# Patient Record
Sex: MALE | Race: WHITE | NOT HISPANIC OR LATINO | Employment: OTHER | URBAN - METROPOLITAN AREA
[De-identification: names, ages, dates, MRNs, and addresses within clinical notes are randomized per-mention and may not be internally consistent; named-entity substitution may affect disease eponyms.]

---

## 2019-04-02 ENCOUNTER — HOSPITAL ENCOUNTER (EMERGENCY)
Facility: HOSPITAL | Age: 74
Discharge: OTHER INSTITUTION | End: 2019-04-03
Attending: FAMILY MEDICINE
Payer: MEDICARE

## 2019-04-02 DIAGNOSIS — R06.02 SOB (SHORTNESS OF BREATH): ICD-10-CM

## 2019-04-02 DIAGNOSIS — N17.9 AKI (ACUTE KIDNEY INJURY): Primary | ICD-10-CM

## 2019-04-02 DIAGNOSIS — I50.9 CONGESTIVE HEART FAILURE, UNSPECIFIED HF CHRONICITY, UNSPECIFIED HEART FAILURE TYPE: ICD-10-CM

## 2019-04-02 PROBLEM — I50.21 ACUTE SYSTOLIC CONGESTIVE HEART FAILURE: Status: ACTIVE | Noted: 2019-04-02

## 2019-04-02 PROBLEM — J96.21 ACUTE ON CHRONIC RESPIRATORY FAILURE WITH HYPOXIA: Status: ACTIVE | Noted: 2019-04-02

## 2019-04-02 PROBLEM — I10 ESSENTIAL HYPERTENSION: Status: ACTIVE | Noted: 2019-04-02

## 2019-04-02 LAB
ALBUMIN SERPL BCP-MCNC: 3 G/DL (ref 3.5–5.2)
ALLENS TEST: ABNORMAL
ALP SERPL-CCNC: 88 U/L (ref 55–135)
ALT SERPL W/O P-5'-P-CCNC: 8 U/L (ref 10–44)
ANION GAP SERPL CALC-SCNC: 12 MMOL/L (ref 8–16)
AST SERPL-CCNC: 14 U/L (ref 10–40)
BACTERIA #/AREA URNS HPF: ABNORMAL /HPF
BASOPHILS # BLD AUTO: 0.05 K/UL (ref 0–0.2)
BASOPHILS NFR BLD: 0.5 % (ref 0–1.9)
BILIRUB SERPL-MCNC: 0.7 MG/DL (ref 0.1–1)
BILIRUB UR QL STRIP: NEGATIVE
BNP SERPL-MCNC: 1027 PG/ML (ref 0–99)
BUN SERPL-MCNC: 84 MG/DL (ref 8–23)
CALCIUM SERPL-MCNC: 8.5 MG/DL (ref 8.7–10.5)
CHLORIDE SERPL-SCNC: 104 MMOL/L (ref 95–110)
CLARITY UR: ABNORMAL
CO2 SERPL-SCNC: 22 MMOL/L (ref 23–29)
COLOR UR: YELLOW
CPAPPEEP: ABNORMAL
CREAT SERPL-MCNC: 3.4 MG/DL (ref 0.5–1.4)
D DIMER PPP FEU-MCNC: 1580 NG/ML (ref 100–400)
DIFFERENTIAL METHOD: ABNORMAL
EOSINOPHIL # BLD AUTO: 0.1 K/UL (ref 0–0.5)
EOSINOPHIL NFR BLD: 1.2 % (ref 0–8)
ERYTHROCYTE [DISTWIDTH] IN BLOOD BY AUTOMATED COUNT: 17.7 % (ref 11.5–14.5)
ERYTHROCYTE [SEDIMENTATION RATE] IN BLOOD BY WESTERGREN METHOD: ABNORMAL MM/H
EST. GFR  (AFRICAN AMERICAN): 19.7 ML/MIN/1.73 M^2
EST. GFR  (NON AFRICAN AMERICAN): 17 ML/MIN/1.73 M^2
GLUCOSE SERPL-MCNC: 130 MG/DL (ref 70–110)
GLUCOSE UR QL STRIP: NEGATIVE
HCO3 UR-SCNC: 22.8 MMOL/L (ref 22–26)
HCT VFR BLD AUTO: 30 % (ref 40–54)
HGB BLD-MCNC: 9.1 G/DL (ref 14–18)
HGB UR QL STRIP: ABNORMAL
HYALINE CASTS #/AREA URNS LPF: 0 /LPF
IMM GRANULOCYTES # BLD AUTO: 0.04 K/UL (ref 0–0.04)
IMM GRANULOCYTES NFR BLD AUTO: 0.4 % (ref 0–0.5)
KETONES UR QL STRIP: NEGATIVE
LEUKOCYTE ESTERASE UR QL STRIP: ABNORMAL
LYMPHOCYTES # BLD AUTO: 1.2 K/UL (ref 1–4.8)
LYMPHOCYTES NFR BLD: 11.2 % (ref 18–48)
MCH RBC QN AUTO: 25.9 PG (ref 27–31)
MCHC RBC AUTO-ENTMCNC: 30.3 G/DL (ref 32–36)
MCV RBC AUTO: 86 FL (ref 82–98)
MICROSCOPIC COMMENT: ABNORMAL
MODE: ABNORMAL
MONOCYTES # BLD AUTO: 0.9 K/UL (ref 0.3–1)
MONOCYTES NFR BLD: 8.9 % (ref 4–15)
NEUTROPHILS # BLD AUTO: 8 K/UL (ref 1.8–7.7)
NEUTROPHILS NFR BLD: 77.8 % (ref 38–73)
NITRITE UR QL STRIP: POSITIVE
NRBC BLD-RTO: 0 /100 WBC
O2DEVICE: ABNORMAL
PCO2 BLDA: 46.7 MMHG (ref 35–45)
PH SMN: 7.31 [PH] (ref 7.35–7.45)
PH UR STRIP: 5 [PH] (ref 5–8)
PIP: ABNORMAL
PLATELET # BLD AUTO: 164 K/UL (ref 150–350)
PMV BLD AUTO: 11 FL (ref 9.2–12.9)
PO2 BLDA: 60.8 MMHG (ref 75–100)
POC BE: -3.6 MMOL/L (ref -2–2)
POC FIO2: 40
POC FLOW: ABNORMAL
POC O2 PERCENT: ABNORMAL %
POC SATURATED O2: 86.9 % (ref 90–100)
POC TCO2: 24.3 MMOL/L (ref 22–28)
POC TEMPERATURE: ABNORMAL C
POTASSIUM SERPL-SCNC: 4.4 MMOL/L (ref 3.5–5.1)
PRESSURE SUPPORT: ABNORMAL
PROT SERPL-MCNC: 7.5 G/DL (ref 6–8.4)
PROT UR QL STRIP: ABNORMAL
RBC # BLD AUTO: 3.51 M/UL (ref 4.6–6.2)
RBC #/AREA URNS HPF: 6 /HPF (ref 0–4)
SITE: ABNORMAL
SODIUM SERPL-SCNC: 138 MMOL/L (ref 136–145)
SP GR UR STRIP: 1.02 (ref 1–1.03)
SQUAMOUS #/AREA URNS HPF: 2 /HPF
URN SPEC COLLECT METH UR: ABNORMAL
UROBILINOGEN UR STRIP-ACNC: NEGATIVE EU/DL
VT: ABNORMAL
WBC # BLD AUTO: 10.24 K/UL (ref 3.9–12.7)
WBC #/AREA URNS HPF: 11 /HPF (ref 0–5)

## 2019-04-02 PROCEDURE — 83880 ASSAY OF NATRIURETIC PEPTIDE: CPT

## 2019-04-02 PROCEDURE — 85025 COMPLETE CBC W/AUTO DIFF WBC: CPT

## 2019-04-02 PROCEDURE — 25000003 PHARM REV CODE 250: Performed by: FAMILY MEDICINE

## 2019-04-02 PROCEDURE — 25000242 PHARM REV CODE 250 ALT 637 W/ HCPCS: Performed by: FAMILY MEDICINE

## 2019-04-02 PROCEDURE — 63600175 PHARM REV CODE 636 W HCPCS: Performed by: FAMILY MEDICINE

## 2019-04-02 PROCEDURE — 87077 CULTURE AEROBIC IDENTIFY: CPT

## 2019-04-02 PROCEDURE — 99900035 HC TECH TIME PER 15 MIN (STAT)

## 2019-04-02 PROCEDURE — 80053 COMPREHEN METABOLIC PANEL: CPT

## 2019-04-02 PROCEDURE — 25000242 PHARM REV CODE 250 ALT 637 W/ HCPCS: Performed by: EMERGENCY MEDICINE

## 2019-04-02 PROCEDURE — 93005 ELECTROCARDIOGRAM TRACING: CPT

## 2019-04-02 PROCEDURE — 94640 AIRWAY INHALATION TREATMENT: CPT

## 2019-04-02 PROCEDURE — 87088 URINE BACTERIA CULTURE: CPT

## 2019-04-02 PROCEDURE — 94760 N-INVAS EAR/PLS OXIMETRY 1: CPT

## 2019-04-02 PROCEDURE — 94761 N-INVAS EAR/PLS OXIMETRY MLT: CPT

## 2019-04-02 PROCEDURE — 85379 FIBRIN DEGRADATION QUANT: CPT

## 2019-04-02 PROCEDURE — 81000 URINALYSIS NONAUTO W/SCOPE: CPT

## 2019-04-02 PROCEDURE — 71045 X-RAY EXAM CHEST 1 VIEW: CPT | Mod: 26,,, | Performed by: RADIOLOGY

## 2019-04-02 PROCEDURE — 96374 THER/PROPH/DIAG INJ IV PUSH: CPT

## 2019-04-02 PROCEDURE — 99285 EMERGENCY DEPT VISIT HI MDM: CPT | Mod: 25

## 2019-04-02 PROCEDURE — 27100171 HC OXYGEN HIGH FLOW UP TO 24 HOURS

## 2019-04-02 PROCEDURE — 71045 XR CHEST AP PORTABLE: ICD-10-PCS | Mod: 26,,, | Performed by: RADIOLOGY

## 2019-04-02 PROCEDURE — 71045 X-RAY EXAM CHEST 1 VIEW: CPT | Mod: TC,FY

## 2019-04-02 PROCEDURE — 96376 TX/PRO/DX INJ SAME DRUG ADON: CPT

## 2019-04-02 PROCEDURE — 87186 SC STD MICRODIL/AGAR DIL: CPT

## 2019-04-02 PROCEDURE — 93010 ELECTROCARDIOGRAM REPORT: CPT | Mod: ,,, | Performed by: INTERNAL MEDICINE

## 2019-04-02 PROCEDURE — 87086 URINE CULTURE/COLONY COUNT: CPT

## 2019-04-02 PROCEDURE — 93010 EKG 12-LEAD: ICD-10-PCS | Mod: ,,, | Performed by: INTERNAL MEDICINE

## 2019-04-02 PROCEDURE — 36600 WITHDRAWAL OF ARTERIAL BLOOD: CPT

## 2019-04-02 PROCEDURE — 27000221 HC OXYGEN, UP TO 24 HOURS

## 2019-04-02 PROCEDURE — 27100092 HC HIGH FLOW DELIVERY CANNULA

## 2019-04-02 RX ORDER — PANTOPRAZOLE SODIUM 40 MG/1
TABLET, DELAYED RELEASE ORAL
COMMUNITY

## 2019-04-02 RX ORDER — HYDROCODONE BITARTRATE AND ACETAMINOPHEN 7.5; 325 MG/1; MG/1
TABLET ORAL
COMMUNITY

## 2019-04-02 RX ORDER — SULINDAC 200 MG/1
TABLET ORAL
COMMUNITY

## 2019-04-02 RX ORDER — OMEPRAZOLE 20 MG/1
CAPSULE, DELAYED RELEASE ORAL
COMMUNITY

## 2019-04-02 RX ORDER — FUROSEMIDE 10 MG/ML
40 INJECTION INTRAMUSCULAR; INTRAVENOUS
Status: COMPLETED | OUTPATIENT
Start: 2019-04-02 | End: 2019-04-02

## 2019-04-02 RX ORDER — HYDROXYZINE PAMOATE 25 MG/1
CAPSULE ORAL
Status: ON HOLD | COMMUNITY
End: 2019-04-12 | Stop reason: HOSPADM

## 2019-04-02 RX ORDER — FOLIC ACID 1 MG/1
TABLET ORAL
COMMUNITY

## 2019-04-02 RX ORDER — PREGABALIN 150 MG/1
CAPSULE ORAL
COMMUNITY

## 2019-04-02 RX ORDER — IPRATROPIUM BROMIDE AND ALBUTEROL SULFATE 2.5; .5 MG/3ML; MG/3ML
3 SOLUTION RESPIRATORY (INHALATION)
Status: COMPLETED | OUTPATIENT
Start: 2019-04-02 | End: 2019-04-02

## 2019-04-02 RX ORDER — CLOPIDOGREL BISULFATE 75 MG/1
TABLET ORAL
COMMUNITY

## 2019-04-02 RX ORDER — OXYBUTYNIN CHLORIDE 5 MG/1
TABLET, EXTENDED RELEASE ORAL
COMMUNITY

## 2019-04-02 RX ORDER — POTASSIUM CHLORIDE 20 MEQ/1
TABLET, EXTENDED RELEASE ORAL
Status: ON HOLD | COMMUNITY
End: 2019-04-12 | Stop reason: HOSPADM

## 2019-04-02 RX ORDER — SODIUM CHLORIDE 9 MG/ML
1000 INJECTION, SOLUTION INTRAVENOUS
Status: COMPLETED | OUTPATIENT
Start: 2019-04-02 | End: 2019-04-02

## 2019-04-02 RX ORDER — SUCRALFATE 1 G/1
TABLET ORAL
COMMUNITY

## 2019-04-02 RX ORDER — ALBUTEROL SULFATE 90 UG/1
AEROSOL, METERED RESPIRATORY (INHALATION)
COMMUNITY

## 2019-04-02 RX ORDER — HYDROCHLOROTHIAZIDE 50 MG/1
TABLET ORAL
Status: ON HOLD | COMMUNITY
End: 2019-04-12 | Stop reason: HOSPADM

## 2019-04-02 RX ORDER — ONDANSETRON 4 MG/1
TABLET, FILM COATED ORAL
COMMUNITY

## 2019-04-02 RX ORDER — ATORVASTATIN CALCIUM 20 MG/1
TABLET, FILM COATED ORAL
COMMUNITY

## 2019-04-02 RX ORDER — FERROUS SULFATE 325(65) MG
TABLET ORAL
Status: ON HOLD | COMMUNITY
End: 2019-04-12 | Stop reason: HOSPADM

## 2019-04-02 RX ORDER — LANOLIN ALCOHOL/MO/W.PET/CERES
CREAM (GRAM) TOPICAL
COMMUNITY

## 2019-04-02 RX ORDER — LOSARTAN POTASSIUM AND HYDROCHLOROTHIAZIDE 25; 100 MG/1; MG/1
TABLET ORAL
Status: ON HOLD | COMMUNITY
End: 2019-04-12 | Stop reason: HOSPADM

## 2019-04-02 RX ORDER — ESCITALOPRAM OXALATE 10 MG/1
TABLET ORAL
COMMUNITY

## 2019-04-02 RX ORDER — NITROGLYCERIN 0.4 MG/1
TABLET SUBLINGUAL
COMMUNITY

## 2019-04-02 RX ORDER — TRAZODONE HYDROCHLORIDE 50 MG/1
TABLET ORAL
Status: ON HOLD | COMMUNITY
End: 2019-04-12 | Stop reason: HOSPADM

## 2019-04-02 RX ORDER — POLYETHYLENE GLYCOL 3350, SODIUM SULFATE ANHYDROUS, SODIUM BICARBONATE, SODIUM CHLORIDE, POTASSIUM CHLORIDE 236; 22.74; 6.74; 5.86; 2.97 G/4L; G/4L; G/4L; G/4L; G/4L
POWDER, FOR SOLUTION ORAL
COMMUNITY

## 2019-04-02 RX ADMIN — FUROSEMIDE 40 MG: 10 INJECTION, SOLUTION INTRAVENOUS at 03:04

## 2019-04-02 RX ADMIN — IPRATROPIUM BROMIDE AND ALBUTEROL SULFATE 3 ML: .5; 3 SOLUTION RESPIRATORY (INHALATION) at 02:04

## 2019-04-02 RX ADMIN — IPRATROPIUM BROMIDE AND ALBUTEROL SULFATE 3 ML: .5; 3 SOLUTION RESPIRATORY (INHALATION) at 01:04

## 2019-04-02 RX ADMIN — IPRATROPIUM BROMIDE AND ALBUTEROL SULFATE 3 ML: .5; 3 SOLUTION RESPIRATORY (INHALATION) at 07:04

## 2019-04-02 RX ADMIN — FUROSEMIDE 40 MG: 10 INJECTION, SOLUTION INTRAVENOUS at 05:04

## 2019-04-02 RX ADMIN — SODIUM CHLORIDE 1000 ML: 9 INJECTION, SOLUTION INTRAVENOUS at 02:04

## 2019-04-02 NOTE — ED NOTES
Patient placed on continuous cardiac monitor, automatic blood pressure cuff and continuous pulse oximeter. JAVID Funes RN

## 2019-04-02 NOTE — ED PROVIDER NOTES
Encounter Date: 2019       History     Chief Complaint   Patient presents with    Shortness of Breath     onset x5 days ago     74-year-old male presents the ED ambulatory complaining of shortness of breath progressing over the last 2 weeks with increased  and orthopnea over the past 2 days he is not known to have kidney problems and he denies any recent NSAID use he has home oxygen at 2 L and is followed by , he is seen by cardiologist Dr. BERTHA Griffith in [facility]        Review of patient's allergies indicates:   Allergen Reactions    Codeine Nausea And Vomiting     Past Medical History:   Diagnosis Date    Cancer     skin cancer    Coronary artery disease     Hypertension      Past Surgical History:   Procedure Laterality Date    APPENDECTOMY      CARDIAC SURGERY      TONSILLECTOMY       No family history on file.  Social History     Tobacco Use    Smoking status: Former Smoker     Last attempt to quit: 2011     Years since quittin.0   Substance Use Topics    Alcohol use: Never     Frequency: Never    Drug use: Never     Review of Systems   Constitutional: Positive for activity change, appetite change and fatigue. Negative for fever.   HENT: Negative for sore throat.    Respiratory: Positive for shortness of breath.    Cardiovascular: Negative for chest pain.   Gastrointestinal: Negative for nausea.   Genitourinary: Negative for dysuria.   Musculoskeletal: Negative for back pain.   Skin: Negative for rash.   Neurological: Positive for weakness. Negative for headaches.   Hematological: Does not bruise/bleed easily.   Psychiatric/Behavioral: Negative for agitation and behavioral problems.       Physical Exam     Initial Vitals [19 1317]   BP Pulse Resp Temp SpO2   (!) 119/48 69 20 97.4 °F (36.3 °C) (!) 50 %      MAP       --         Physical Exam    Nursing note and vitals reviewed.  Constitutional: He appears well-developed. He is not diaphoretic. He appears ill. He appears  distressed.   HENT:   Head: Normocephalic and atraumatic.   Right Ear: External ear normal.   Left Ear: External ear normal.   Nose: Nose normal.   Mouth/Throat: Oropharynx is clear and moist. No oropharyngeal exudate.   Eyes: EOM are normal.   Neck: Normal range of motion. Neck supple. No tracheal deviation present.   Cardiovascular: Normal rate and regular rhythm.   No murmur heard.  Pulmonary/Chest: Accessory muscle usage present. No stridor. Tachypnea noted. No respiratory distress. He has wheezes. He has rales in the right lower field and the left lower field.   Patient was in acute respiratory distress on presentation could speak 1-2 word phrases initial O2 sat was in the 50s apparently, this was validated with the pulse rate   Abdominal: Soft. He exhibits no distension and no mass. There is no tenderness. There is no rebound.   Musculoskeletal: Normal range of motion. He exhibits no edema.   Lymphadenopathy:     He has no cervical adenopathy.   Neurological: He is alert and oriented to person, place, and time. He has normal strength.   Skin: Skin is warm and dry. Capillary refill takes less than 2 seconds. No pallor.   Psychiatric: He has a normal mood and affect.         ED Course   Procedures  Labs Reviewed   CBC W/ AUTO DIFFERENTIAL - Abnormal; Notable for the following components:       Result Value    RBC 3.51 (*)     Hemoglobin 9.1 (*)     Hematocrit 30.0 (*)     MCH 25.9 (*)     MCHC 30.3 (*)     RDW 17.7 (*)     Gran # (ANC) 8.0 (*)     Gran% 77.8 (*)     Lymph% 11.2 (*)     All other components within normal limits   B-TYPE NATRIURETIC PEPTIDE - Abnormal; Notable for the following components:    BNP 1,027 (*)     All other components within normal limits   COMPREHENSIVE METABOLIC PANEL - Abnormal; Notable for the following components:    CO2 22 (*)     Glucose 130 (*)     BUN, Bld 84 (*)     Creatinine 3.4 (*)     Calcium 8.5 (*)     Albumin 3.0 (*)     ALT 8 (*)     eGFR if African American 19.7 (*)      eGFR if non  17.0 (*)     All other components within normal limits   D DIMER, QUANTITATIVE - Abnormal; Notable for the following components:    D-Dimer 1580 (*)     All other components within normal limits    Narrative:      ddimer  critical result(s) called and verbal readback obtained from   sarbjit, 04/02/2019 14:09     EKG Readings: (Independently Interpreted)   Initial Reading: No STEMI. Previous EKG Date: n/a. Rhythm: Normal Sinus Rhythm. Heart Rate: 60. ST Segments: Normal ST Segments. T Waves: Normal.       Imaging Results          X-Ray Chest AP Portable (Final result)  Result time 04/02/19 16:24:49    Final result by Santiago Greenberg MD (04/02/19 16:24:49)                 Impression:      Between small to moderate bilateral pleural effusions.  Bibasilar airspace disease could reflect edema, atelectasis, pneumonia.      Electronically signed by: Santiago Greenberg  Date:    04/02/2019  Time:    16:24             Narrative:    EXAMINATION:  XR CHEST AP PORTABLE    CLINICAL HISTORY:  sob;    TECHNIQUE:  Single frontal view of the chest was performed.    COMPARISON:  12/04/2006    FINDINGS:  Patient rotated to the right.    Bibasilar airspace disease.  Heart borders are partially obscured although silhouette does appear enlarged.  Aortic atherosclerosis.  Blunted bilateral costophrenic angles.  No pneumothorax.  Indistinct pulmonary vasculature.                              X-Rays:   Independently Interpreted Readings:   Other Readings:  Bilateral pleural effusions    Medical Decision Making:   ED Management:  Patient improved steadily with treatment in the ED, ABGs after defervescence:  PH 7.3, CO2 46.7, O2 60.8 that was on 4 liters/minute nasal cannula  Other:   I have discussed this case with another health care provider.       <> Summary of the Discussion: We have no records on this patient or prior labs but considering the degree of his acute kidney injury which is complicating  his congestive heart failure patient will need an evaluation by Nephrology and also evaluation by Cardiology I discussed this with our hospitalist , and he concurs with transfer patient request [facility] contact was made through the regional referral center however [facility] had no telemetry beds available for the patient, I spoke with the patient and he is agreeable to transfer to [place] if it can be arranged         The evaluation, management and treatment of this patient involved Critical Care services  amounting to 100 minutes of direct involvement.  This time was exclusive of any billable procedures.           ED Course as of Apr 03 0757   Tue Apr 02, 2019   1717 Case discussed with transfer physician Dr. Carranza who will work on transfer to the [place]    [WK]      ED Course User Index  [WK] Adryan hWite MD     Clinical Impression:       ICD-10-CM ICD-9-CM   1. COSTA (acute kidney injury) N17.9 584.9   2. SOB (shortness of breath) R06.02 786.05   3. Congestive heart failure, unspecified HF chronicity, unspecified heart failure type I50.9 428.0                                Adryan White MD  04/02/19 1753       Adryan White MD  04/03/19 0750

## 2019-04-02 NOTE — ED NOTES
Received phone call from [facility] stating that [facility] has to decline patient due to no available telemetry beds; spoke with Kristyn. ED physician advised and states to attempt placement to [facility] facilities.

## 2019-04-02 NOTE — PLAN OF CARE
Outside Transfer Acceptance Note       Patients name/MRN: Duc Spears/MRN [id number]     Referring Physician or Mid-Level provider giving report: Dr. Adryan hWite (Emergency Medicine)  Contact number: [phone]    Referral Facility: [facility] ED in [place], [place]    Date/Time of Acceptance: 4/2/2019 5:26 PM    Accepting Physician for [facility]: Jennifer Gunter MD (); Accepting physician at [facility] Dr. Mimi Landa from Hospital Medicine     Accepting facility: [facility] ICU    Consulting Physicians from [facility] involved in case:    Reason for transfer request: Needs Nephrology for COSTA not available at [facility]     Report from Physician/Mid-Level Provider/HPI: 74 y/o male with essential HTN, chronic respiratory failure on home oxygen on 2 liters and history of CAD with MI in the 1990s but no history of CABG or cardiac stent and active cigarette smoker presented to [facility] ED today with complaints of progressive SOB and wheezing for 2 weeks but worsened over past 2 days. Patient on presentation to ER was in respiratory distress with oxygen sats in 60s with diffuse wheezing. Patient given Albuterol nebulized treatments x 2 and placed on 4 liters of oxygen with oxygen sats improving to upper 80s. Patient on labs noted to have elevated BNP of 1027 and CXR that showed bilateral pleural effusions. Patient noted to have COSTA with BUN/Cr 84/3.4. Patient denied any previous history of renal disease or NSAID use. Patient told ER that he was on 3 BP pills but unsure of the name of his meds. Dr. Griffith at [facility] is his Cardiologist and patient stated he had history of heart failure. There are no labs to compare so baseline renal function unknown. Patient reported he was urinating at home but has not urinated in ER so far. Patient given IV Lasix 40 mg x 1 dose at 1553 and no urine so far produced so no U/A has been done. Dr. White  "reports after IV Lasix and Albuterol nebs patient is feeling much better and awake and alert and not in any distress on 40% VM with sats 91% and patient was asking about going home as he felt better. EKG done in ER showed normal sinus rhythm with no acute ST or T wave abnormalities. Patient needs admit to treat heart failure and also for evaluation of COSTA. No Nephrology available at [facility] so request for transfer for Nephrology. [facility] had no telemetry beds which patients first choice. Discussed with Dr. Landa and patient accepted at [facility] to ICU,    VS: /105   Pulse 71   Temp 97.4 °F (36.3 °C) (Oral)   Resp 13   Ht 5' 9" (1.753 m)   Wt 86.2 kg (190 lb)   SpO2 91% on 40% VM  BMI 28.06 kg/m²    Past Medical/Surgical History: CHF, Chronic respiratory failure on home oxygen, essential HTN     Meds given in ED: Albuterol nebs x 2 and IV Lasix 40 mg x 1 dose at 1553    Labs:   BNP 1027  AB.31/46/60.8/22.8/86.9% on FIO2 at 40%  BUN/Cr 84/3.4; K+ 4.4; HCO3 22  D-Dimer 1580  H/H 9.1/30  WBC 10.2     Imaging:   CXR: Bilateral moderate sized pleural effusions     To Do List upon arrival:     Evaluation of COSTA   Treatment for heart failure exacerbation   2 D echo to evaluate cardiac function   Strict I+Os    Titrate oxygen for oxygen sats > 88%    Jennifer Gunter MD  Senior Staff Physician  Department of Hospital Medicine  [facility]/   [phone]  "

## 2019-04-03 ENCOUNTER — HOSPITAL ENCOUNTER (INPATIENT)
Facility: HOSPITAL | Age: 74
LOS: 9 days | Discharge: HOME HEALTH | DRG: 291 | End: 2019-04-12
Attending: INTERNAL MEDICINE | Admitting: INTERNAL MEDICINE
Payer: MEDICARE

## 2019-04-03 VITALS
BODY MASS INDEX: 28.14 KG/M2 | HEART RATE: 72 BPM | SYSTOLIC BLOOD PRESSURE: 180 MMHG | HEIGHT: 69 IN | TEMPERATURE: 98 F | OXYGEN SATURATION: 86 % | DIASTOLIC BLOOD PRESSURE: 73 MMHG | RESPIRATION RATE: 14 BRPM | WEIGHT: 190 LBS

## 2019-04-03 DIAGNOSIS — R79.89 ELEVATED D-DIMER: ICD-10-CM

## 2019-04-03 DIAGNOSIS — J81.0 ACUTE PULMONARY EDEMA: ICD-10-CM

## 2019-04-03 DIAGNOSIS — I10 ESSENTIAL HYPERTENSION: ICD-10-CM

## 2019-04-03 DIAGNOSIS — N17.9 AKI (ACUTE KIDNEY INJURY): ICD-10-CM

## 2019-04-03 DIAGNOSIS — I50.31 ACUTE DIASTOLIC CONGESTIVE HEART FAILURE: ICD-10-CM

## 2019-04-03 DIAGNOSIS — I25.10 CORONARY ARTERY DISEASE, ANGINA PRESENCE UNSPECIFIED, UNSPECIFIED VESSEL OR LESION TYPE, UNSPECIFIED WHETHER NATIVE OR TRANSPLANTED HEART: ICD-10-CM

## 2019-04-03 DIAGNOSIS — I50.9 ACUTE CHF: ICD-10-CM

## 2019-04-03 DIAGNOSIS — J96.21 ACUTE ON CHRONIC RESPIRATORY FAILURE WITH HYPOXIA: ICD-10-CM

## 2019-04-03 DIAGNOSIS — R07.9 CHEST PAIN: ICD-10-CM

## 2019-04-03 DIAGNOSIS — J96.02 ACUTE HYPERCAPNIC RESPIRATORY FAILURE: ICD-10-CM

## 2019-04-03 DIAGNOSIS — J44.1 COPD EXACERBATION: Primary | ICD-10-CM

## 2019-04-03 DIAGNOSIS — I50.9 CHF (CONGESTIVE HEART FAILURE): ICD-10-CM

## 2019-04-03 PROBLEM — I70.0 CALCIFICATION OF AORTA: Status: ACTIVE | Noted: 2019-04-03

## 2019-04-03 LAB
ANION GAP SERPL CALC-SCNC: 11 MMOL/L (ref 8–16)
AORTIC ROOT ANNULUS: 3.47 CM
AORTIC VALVE CUSP SEPERATION: 1.3 CM
AV INDEX (PROSTH): 0.59
AV MEAN GRADIENT: 3.75 MMHG
AV PEAK GRADIENT: 6.25 MMHG
AV VALVE AREA: 1.89 CM2
AV VELOCITY RATIO: 0.56
BASOPHILS # BLD AUTO: 0.1 K/UL (ref 0–0.2)
BASOPHILS NFR BLD: 0.7 % (ref 0–1.9)
BSA FOR ECHO PROCEDURE: 2.06 M2
BUN SERPL-MCNC: 81 MG/DL (ref 8–23)
CALCIUM SERPL-MCNC: 9.4 MG/DL (ref 8.7–10.5)
CHLORIDE SERPL-SCNC: 105 MMOL/L (ref 95–110)
CO2 SERPL-SCNC: 23 MMOL/L (ref 23–29)
COMPLEXED PSA SERPL-MCNC: 1.2 NG/ML (ref 0–4)
CREAT SERPL-MCNC: 3.9 MG/DL (ref 0.5–1.4)
CV ECHO LV RWT: 0.49 CM
DIFFERENTIAL METHOD: ABNORMAL
DOP CALC AO PEAK VEL: 1.25 M/S
DOP CALC AO VTI: 31.92 CM
DOP CALC LVOT AREA: 3.2 CM2
DOP CALC LVOT DIAMETER: 2.02 CM
DOP CALC LVOT PEAK VEL: 0.7 M/S
DOP CALC LVOT STROKE VOLUME: 60.28 CM3
DOP CALCLVOT PEAK VEL VTI: 18.82 CM
E WAVE DECELERATION TIME: 213.32 MSEC
E/A RATIO: 2.27
E/E' RATIO: 25
ECHO LV POSTERIOR WALL: 1.2 CM (ref 0.6–1.1)
EOSINOPHIL # BLD AUTO: 0.2 K/UL (ref 0–0.5)
EOSINOPHIL NFR BLD: 2 % (ref 0–8)
ERYTHROCYTE [DISTWIDTH] IN BLOOD BY AUTOMATED COUNT: 19.1 % (ref 11.5–14.5)
EST. GFR  (AFRICAN AMERICAN): 17 ML/MIN/1.73 M^2
EST. GFR  (NON AFRICAN AMERICAN): 14 ML/MIN/1.73 M^2
FRACTIONAL SHORTENING: 34 % (ref 28–44)
GLUCOSE SERPL-MCNC: 104 MG/DL (ref 70–110)
HCT VFR BLD AUTO: 31.1 % (ref 40–54)
HGB BLD-MCNC: 9.8 G/DL (ref 14–18)
INTERVENTRICULAR SEPTUM: 1.2 CM (ref 0.6–1.1)
IVRT: 0.05 MSEC
LEFT ATRIUM SIZE: 4.99 CM
LEFT INTERNAL DIMENSION IN SYSTOLE: 3.21 CM (ref 2.1–4)
LEFT VENTRICLE DIASTOLIC VOLUME INDEX: 54.75 ML/M2
LEFT VENTRICLE DIASTOLIC VOLUME: 110.63 ML
LEFT VENTRICLE MASS INDEX: 110.6 G/M2
LEFT VENTRICLE SYSTOLIC VOLUME INDEX: 20.4 ML/M2
LEFT VENTRICLE SYSTOLIC VOLUME: 41.28 ML
LEFT VENTRICULAR INTERNAL DIMENSION IN DIASTOLE: 4.86 CM (ref 3.5–6)
LEFT VENTRICULAR MASS: 223.46 G
LV LATERAL E/E' RATIO: 25
LV SEPTAL E/E' RATIO: 25
LYMPHOCYTES # BLD AUTO: 1.4 K/UL (ref 1–4.8)
LYMPHOCYTES NFR BLD: 14.9 % (ref 18–48)
MAGNESIUM SERPL-MCNC: 2.5 MG/DL (ref 1.6–2.6)
MCH RBC QN AUTO: 25.6 PG (ref 27–31)
MCHC RBC AUTO-ENTMCNC: 31.5 G/DL (ref 32–36)
MCV RBC AUTO: 81 FL (ref 82–98)
MONOCYTES # BLD AUTO: 0.9 K/UL (ref 0.3–1)
MONOCYTES NFR BLD: 9.3 % (ref 4–15)
MV PEAK A VEL: 0.55 M/S
MV PEAK E VEL: 1.25 M/S
NEUTROPHILS # BLD AUTO: 6.8 K/UL (ref 1.8–7.7)
NEUTROPHILS NFR BLD: 73.1 % (ref 38–73)
PHOSPHATE SERPL-MCNC: 6.5 MG/DL (ref 2.7–4.5)
PISA TR MAX VEL: 2.66 M/S
PLATELET # BLD AUTO: 194 K/UL (ref 150–350)
PMV BLD AUTO: 9.8 FL (ref 9.2–12.9)
POTASSIUM SERPL-SCNC: 4.1 MMOL/L (ref 3.5–5.1)
PROCALCITONIN SERPL IA-MCNC: 0.12 NG/ML
PV PEAK VELOCITY: 1.21 CM/S
RA PRESSURE: 15 MMHG
RBC # BLD AUTO: 3.84 M/UL (ref 4.6–6.2)
RIGHT VENTRICULAR END-DIASTOLIC DIMENSION: 3.9 CM
SODIUM SERPL-SCNC: 139 MMOL/L (ref 136–145)
TDI LATERAL: 0.05
TDI SEPTAL: 0.05
TDI: 0.05
TR MAX PG: 28.3 MMHG
TRICUSPID ANNULAR PLANE SYSTOLIC EXCURSION: 1.78 CM
TROPONIN I SERPL DL<=0.01 NG/ML-MCNC: 0.02 NG/ML (ref 0–0.03)
TROPONIN I SERPL DL<=0.01 NG/ML-MCNC: <0.006 NG/ML (ref 0–0.03)
TV REST PULMONARY ARTERY PRESSURE: 43 MMHG
WBC # BLD AUTO: 9.4 K/UL (ref 3.9–12.7)

## 2019-04-03 PROCEDURE — 99900035 HC TECH TIME PER 15 MIN (STAT)

## 2019-04-03 PROCEDURE — 63600175 PHARM REV CODE 636 W HCPCS: Performed by: INTERNAL MEDICINE

## 2019-04-03 PROCEDURE — 20000000 HC ICU ROOM

## 2019-04-03 PROCEDURE — 93005 ELECTROCARDIOGRAM TRACING: CPT

## 2019-04-03 PROCEDURE — 94761 N-INVAS EAR/PLS OXIMETRY MLT: CPT

## 2019-04-03 PROCEDURE — 94660 CPAP INITIATION&MGMT: CPT

## 2019-04-03 PROCEDURE — 63600175 PHARM REV CODE 636 W HCPCS: Performed by: NURSE PRACTITIONER

## 2019-04-03 PROCEDURE — 99291 PR CRITICAL CARE, E/M 30-74 MINUTES: ICD-10-PCS | Mod: ,,, | Performed by: INTERNAL MEDICINE

## 2019-04-03 PROCEDURE — 25000242 PHARM REV CODE 250 ALT 637 W/ HCPCS: Performed by: INTERNAL MEDICINE

## 2019-04-03 PROCEDURE — 36415 COLL VENOUS BLD VENIPUNCTURE: CPT

## 2019-04-03 PROCEDURE — 25000242 PHARM REV CODE 250 ALT 637 W/ HCPCS: Performed by: HOSPITALIST

## 2019-04-03 PROCEDURE — 99291 CRITICAL CARE FIRST HOUR: CPT | Mod: ,,, | Performed by: INTERNAL MEDICINE

## 2019-04-03 PROCEDURE — 87040 BLOOD CULTURE FOR BACTERIA: CPT

## 2019-04-03 PROCEDURE — 84153 ASSAY OF PSA TOTAL: CPT

## 2019-04-03 PROCEDURE — 94640 AIRWAY INHALATION TREATMENT: CPT

## 2019-04-03 PROCEDURE — 80048 BASIC METABOLIC PNL TOTAL CA: CPT

## 2019-04-03 PROCEDURE — 84100 ASSAY OF PHOSPHORUS: CPT

## 2019-04-03 PROCEDURE — 63600175 PHARM REV CODE 636 W HCPCS: Performed by: HOSPITALIST

## 2019-04-03 PROCEDURE — 25500020 PHARM REV CODE 255: Performed by: SPECIALIST

## 2019-04-03 PROCEDURE — 84484 ASSAY OF TROPONIN QUANT: CPT | Mod: 91

## 2019-04-03 PROCEDURE — 27000221 HC OXYGEN, UP TO 24 HOURS

## 2019-04-03 PROCEDURE — 83735 ASSAY OF MAGNESIUM: CPT

## 2019-04-03 PROCEDURE — 85025 COMPLETE CBC W/AUTO DIFF WBC: CPT

## 2019-04-03 PROCEDURE — 25000003 PHARM REV CODE 250: Performed by: NURSE PRACTITIONER

## 2019-04-03 PROCEDURE — 25000003 PHARM REV CODE 250: Performed by: HOSPITALIST

## 2019-04-03 PROCEDURE — 84145 PROCALCITONIN (PCT): CPT

## 2019-04-03 PROCEDURE — 27000190 HC CPAP FULL FACE MASK W/VALVE

## 2019-04-03 PROCEDURE — 25000242 PHARM REV CODE 250 ALT 637 W/ HCPCS: Performed by: NURSE PRACTITIONER

## 2019-04-03 RX ORDER — AMOXICILLIN 250 MG
1 CAPSULE ORAL 2 TIMES DAILY PRN
Status: DISCONTINUED | OUTPATIENT
Start: 2019-04-03 | End: 2019-04-12 | Stop reason: HOSPADM

## 2019-04-03 RX ORDER — HYDRALAZINE HYDROCHLORIDE 20 MG/ML
5 INJECTION INTRAMUSCULAR; INTRAVENOUS EVERY 6 HOURS PRN
Status: DISCONTINUED | OUTPATIENT
Start: 2019-04-03 | End: 2019-04-06

## 2019-04-03 RX ORDER — ONDANSETRON 2 MG/ML
4 INJECTION INTRAMUSCULAR; INTRAVENOUS EVERY 4 HOURS PRN
Status: DISCONTINUED | OUTPATIENT
Start: 2019-04-03 | End: 2019-04-12 | Stop reason: HOSPADM

## 2019-04-03 RX ORDER — OXYBUTYNIN CHLORIDE 5 MG/1
5 TABLET, EXTENDED RELEASE ORAL DAILY
Status: DISCONTINUED | OUTPATIENT
Start: 2019-04-03 | End: 2019-04-12 | Stop reason: HOSPADM

## 2019-04-03 RX ORDER — RAMELTEON 8 MG/1
8 TABLET ORAL NIGHTLY PRN
Status: DISCONTINUED | OUTPATIENT
Start: 2019-04-03 | End: 2019-04-12 | Stop reason: HOSPADM

## 2019-04-03 RX ORDER — ATORVASTATIN CALCIUM 20 MG/1
20 TABLET, FILM COATED ORAL DAILY
Status: DISCONTINUED | OUTPATIENT
Start: 2019-04-03 | End: 2019-04-12 | Stop reason: HOSPADM

## 2019-04-03 RX ORDER — BUDESONIDE 0.5 MG/2ML
0.5 INHALANT ORAL EVERY 12 HOURS
Status: DISCONTINUED | OUTPATIENT
Start: 2019-04-03 | End: 2019-04-12 | Stop reason: HOSPADM

## 2019-04-03 RX ORDER — TRAZODONE HYDROCHLORIDE 50 MG/1
50 TABLET ORAL NIGHTLY
Status: DISCONTINUED | OUTPATIENT
Start: 2019-04-03 | End: 2019-04-12 | Stop reason: HOSPADM

## 2019-04-03 RX ORDER — IPRATROPIUM BROMIDE AND ALBUTEROL SULFATE 2.5; .5 MG/3ML; MG/3ML
3 SOLUTION RESPIRATORY (INHALATION) EVERY 4 HOURS PRN
Status: DISCONTINUED | OUTPATIENT
Start: 2019-04-03 | End: 2019-04-12 | Stop reason: HOSPADM

## 2019-04-03 RX ORDER — FUROSEMIDE 10 MG/ML
80 INJECTION INTRAMUSCULAR; INTRAVENOUS EVERY 8 HOURS
Status: DISCONTINUED | OUTPATIENT
Start: 2019-04-03 | End: 2019-04-04

## 2019-04-03 RX ORDER — ACETAMINOPHEN 325 MG/1
650 TABLET ORAL EVERY 4 HOURS PRN
Status: DISCONTINUED | OUTPATIENT
Start: 2019-04-03 | End: 2019-04-12 | Stop reason: HOSPADM

## 2019-04-03 RX ORDER — FUROSEMIDE 10 MG/ML
40 INJECTION INTRAMUSCULAR; INTRAVENOUS ONCE
Status: COMPLETED | OUTPATIENT
Start: 2019-04-03 | End: 2019-04-03

## 2019-04-03 RX ORDER — FOLIC ACID 1 MG/1
1 TABLET ORAL DAILY
Status: DISCONTINUED | OUTPATIENT
Start: 2019-04-03 | End: 2019-04-12 | Stop reason: HOSPADM

## 2019-04-03 RX ORDER — IPRATROPIUM BROMIDE AND ALBUTEROL SULFATE 2.5; .5 MG/3ML; MG/3ML
3 SOLUTION RESPIRATORY (INHALATION) EVERY 6 HOURS
Status: CANCELLED | OUTPATIENT
Start: 2019-04-03

## 2019-04-03 RX ORDER — IPRATROPIUM BROMIDE AND ALBUTEROL SULFATE 2.5; .5 MG/3ML; MG/3ML
3 SOLUTION RESPIRATORY (INHALATION) EVERY 4 HOURS
Status: DISCONTINUED | OUTPATIENT
Start: 2019-04-03 | End: 2019-04-03

## 2019-04-03 RX ORDER — FAMOTIDINE 20 MG/1
20 TABLET, FILM COATED ORAL 2 TIMES DAILY PRN
Status: DISCONTINUED | OUTPATIENT
Start: 2019-04-03 | End: 2019-04-08 | Stop reason: DRUGHIGH

## 2019-04-03 RX ORDER — FERROUS SULFATE 325(65) MG
325 TABLET, DELAYED RELEASE (ENTERIC COATED) ORAL 3 TIMES DAILY
Status: DISCONTINUED | OUTPATIENT
Start: 2019-04-03 | End: 2019-04-12 | Stop reason: HOSPADM

## 2019-04-03 RX ORDER — IPRATROPIUM BROMIDE AND ALBUTEROL SULFATE 2.5; .5 MG/3ML; MG/3ML
3 SOLUTION RESPIRATORY (INHALATION) EVERY 4 HOURS PRN
Status: DISCONTINUED | OUTPATIENT
Start: 2019-04-03 | End: 2019-04-03 | Stop reason: SDUPTHER

## 2019-04-03 RX ORDER — ENOXAPARIN SODIUM 100 MG/ML
1 INJECTION SUBCUTANEOUS ONCE
Status: COMPLETED | OUTPATIENT
Start: 2019-04-03 | End: 2019-04-03

## 2019-04-03 RX ORDER — IPRATROPIUM BROMIDE 0.5 MG/2.5ML
0.5 SOLUTION RESPIRATORY (INHALATION) EVERY 6 HOURS
Status: DISCONTINUED | OUTPATIENT
Start: 2019-04-03 | End: 2019-04-12 | Stop reason: HOSPADM

## 2019-04-03 RX ORDER — CLOPIDOGREL BISULFATE 75 MG/1
75 TABLET ORAL DAILY
Status: DISCONTINUED | OUTPATIENT
Start: 2019-04-03 | End: 2019-04-12 | Stop reason: HOSPADM

## 2019-04-03 RX ORDER — TIOTROPIUM BROMIDE 18 UG/1
1 CAPSULE ORAL; RESPIRATORY (INHALATION) DAILY
Status: DISCONTINUED | OUTPATIENT
Start: 2019-04-03 | End: 2019-04-03 | Stop reason: SDUPTHER

## 2019-04-03 RX ORDER — LEVALBUTEROL 1.25 MG/.5ML
1.25 SOLUTION, CONCENTRATE RESPIRATORY (INHALATION) EVERY 12 HOURS
Status: DISCONTINUED | OUTPATIENT
Start: 2019-04-03 | End: 2019-04-12 | Stop reason: HOSPADM

## 2019-04-03 RX ORDER — FUROSEMIDE 10 MG/ML
INJECTION INTRAMUSCULAR; INTRAVENOUS
Status: DISPENSED
Start: 2019-04-03 | End: 2019-04-03

## 2019-04-03 RX ORDER — ESCITALOPRAM OXALATE 10 MG/1
10 TABLET ORAL DAILY
Status: DISCONTINUED | OUTPATIENT
Start: 2019-04-03 | End: 2019-04-12 | Stop reason: HOSPADM

## 2019-04-03 RX ORDER — SODIUM CHLORIDE 0.9 % (FLUSH) 0.9 %
10 SYRINGE (ML) INJECTION
Status: DISCONTINUED | OUTPATIENT
Start: 2019-04-03 | End: 2019-04-12 | Stop reason: HOSPADM

## 2019-04-03 RX ORDER — PANTOPRAZOLE SODIUM 40 MG/1
40 TABLET, DELAYED RELEASE ORAL DAILY
Status: DISCONTINUED | OUTPATIENT
Start: 2019-04-03 | End: 2019-04-12 | Stop reason: HOSPADM

## 2019-04-03 RX ADMIN — DOXYCYCLINE 100 MG: 100 INJECTION, POWDER, LYOPHILIZED, FOR SOLUTION INTRAVENOUS at 05:04

## 2019-04-03 RX ADMIN — FERROUS SULFATE TAB EC 325 MG (65 MG FE EQUIVALENT) 325 MG: 325 (65 FE) TABLET DELAYED RESPONSE at 03:04

## 2019-04-03 RX ADMIN — FUROSEMIDE 40 MG: 10 INJECTION, SOLUTION INTRAVENOUS at 03:04

## 2019-04-03 RX ADMIN — SULFUR HEXAFLUORIDE 2.4 ML: KIT at 09:04

## 2019-04-03 RX ADMIN — DOXYCYCLINE 100 MG: 100 INJECTION, POWDER, LYOPHILIZED, FOR SOLUTION INTRAVENOUS at 04:04

## 2019-04-03 RX ADMIN — FOLIC ACID 1 MG: 1 TABLET ORAL at 03:04

## 2019-04-03 RX ADMIN — TRAZODONE HYDROCHLORIDE 50 MG: 50 TABLET ORAL at 09:04

## 2019-04-03 RX ADMIN — PANTOPRAZOLE SODIUM 40 MG: 40 TABLET, DELAYED RELEASE ORAL at 03:04

## 2019-04-03 RX ADMIN — FUROSEMIDE 80 MG: 10 INJECTION, SOLUTION INTRAVENOUS at 08:04

## 2019-04-03 RX ADMIN — LEVALBUTEROL 1.25 MG: 1.25 SOLUTION, CONCENTRATE RESPIRATORY (INHALATION) at 07:04

## 2019-04-03 RX ADMIN — FUROSEMIDE 80 MG: 10 INJECTION, SOLUTION INTRAVENOUS at 09:04

## 2019-04-03 RX ADMIN — OXYBUTYNIN CHLORIDE 5 MG: 5 TABLET, EXTENDED RELEASE ORAL at 03:04

## 2019-04-03 RX ADMIN — IPRATROPIUM BROMIDE 0.5 MG: 0.5 SOLUTION RESPIRATORY (INHALATION) at 01:04

## 2019-04-03 RX ADMIN — ENOXAPARIN SODIUM 90 MG: 100 INJECTION SUBCUTANEOUS at 03:04

## 2019-04-03 RX ADMIN — IPRATROPIUM BROMIDE 0.5 MG: 0.5 SOLUTION RESPIRATORY (INHALATION) at 07:04

## 2019-04-03 RX ADMIN — METHYLPREDNISOLONE SODIUM SUCCINATE 80 MG: 40 INJECTION, POWDER, FOR SOLUTION INTRAMUSCULAR; INTRAVENOUS at 04:04

## 2019-04-03 RX ADMIN — FERROUS SULFATE TAB EC 325 MG (65 MG FE EQUIVALENT) 325 MG: 325 (65 FE) TABLET DELAYED RESPONSE at 09:04

## 2019-04-03 RX ADMIN — ESCITALOPRAM OXALATE 10 MG: 10 TABLET, FILM COATED ORAL at 03:04

## 2019-04-03 RX ADMIN — CLOPIDOGREL BISULFATE 75 MG: 75 TABLET, FILM COATED ORAL at 03:04

## 2019-04-03 RX ADMIN — NITROGLYCERIN 1 INCH: 20 OINTMENT TOPICAL at 03:04

## 2019-04-03 RX ADMIN — BUDESONIDE 0.5 MG: 0.5 SUSPENSION RESPIRATORY (INHALATION) at 07:04

## 2019-04-03 RX ADMIN — FUROSEMIDE 80 MG: 10 INJECTION, SOLUTION INTRAVENOUS at 05:04

## 2019-04-03 RX ADMIN — ATORVASTATIN CALCIUM 20 MG: 20 TABLET, FILM COATED ORAL at 03:04

## 2019-04-03 RX ADMIN — CEFTRIAXONE 2 G: 2 INJECTION, SOLUTION INTRAVENOUS at 03:04

## 2019-04-03 RX ADMIN — IPRATROPIUM BROMIDE AND ALBUTEROL SULFATE 3 ML: .5; 3 SOLUTION RESPIRATORY (INHALATION) at 03:04

## 2019-04-03 NOTE — HPI
Mr. Spears is a 73yo M with a PMH of HTN, Chronic Respiratory failure O2 Dependent, and CAD. He presented to [place] with c/o Dyspnea. It started 2 days ago and got progressively worst. On arrival to [place], he was in respiratory distress and hypoxic. He was found to be in acute CHF and given nebulizers, high flow 02, and a total of 80mg IV Lasix. He was also found to have COSTA. He was transferred to [facility] for Nephrology services. On arrival to ICU, he was in respiratory distress and hypoxic. A 1L bag of IV NS was in progress on his arrival, and about 500cc was left in the bag. The infusion was stopped. Bipap therapy was initiated and he was given another 40mg IV lasix for pulmonary congestion. eICU physician ordered nebulizers. A repeat CXR was done and was essentially unchanged from the one earlier done at [place]. After initiateion of Bipap and a nebulizer treatment, he reports that he is starting to breath better. On Bipap, his SPO2 improved to 90%, up from 70's.

## 2019-04-03 NOTE — PLAN OF CARE
Problem: Adult Inpatient Plan of Care  Goal: Plan of Care Review  Pt remains on BiPAP today.  Attempted to place on NC for short time.  Pt unable to tolerate.  sats decreased to 80.  Back on BiPAP at 85%.  Unable to eat as unable to tolerate not being off mask.  Lasix 80 initiated Q8H.  US of kidney completed.  Unable to obtain VQ scan as pt unable to come off BiPAP.  Cardiology, nephrology and pulmonology following.  POC discussed.  Remains free from injury.  Needing resp culture.

## 2019-04-03 NOTE — PLAN OF CARE
Patient remains on bipap at ordered settings. He is receiving aerosol tx q4       04/03/19 0753 04/03/19 0759   Patient Assessment/Suction   Level of Consciousness (AVPU) alert  --    All Lung Fields Breath Sounds crackles crackles   PRE-TX-O2   O2 Device (Oxygen Therapy) BiPAP  --    $ Is the patient on Low Flow Oxygen? Yes  --    Oxygen Concentration (%) 85  --    SpO2 (!) 93 % (!) 93 %   Pulse Oximetry Type Continuous Continuous   $ Pulse Oximetry - Multiple Charge Pulse Oximetry - Multiple Pulse Oximetry - Multiple   Pulse 80 82   Resp 16 20   Aerosol Therapy   $ Aerosol Therapy Charges Aerosol Treatment Aerosol Treatment   Respiratory Treatment Status (SVN) given given   Treatment Route (SVN) in-line in-line   Patient Position (SVN) HOB elevated HOB elevated   Post Treatment Assessment (SVN) breath sounds unchanged breath sounds unchanged   Signs of Intolerance (SVN) none none   Breath Sounds Post-Respiratory Treatment   Throughout All Fields Post-Treatment no change no change   Post-treatment Heart Rate (beats/min) 82 82   Post-treatment Resp Rate (breaths/min) 21 21   Ready to Wean/Extubation Screen   FIO2<=50 (chart decimal) (!) 0.85  --    Preset CPAP/BiPAP Settings   Mode Of Delivery  --  BiPAP   $ CPAP/BiPAP Daily Charge  --  BiPAP/CPAP Daily   $ Initial CPAP/BiPAP Setup?  --  No   $ Is patient using?  --  Yes   Size of Mask  --  Medium/Large   Sized Appropriately?  --  Yes   Equipment Type  --  V60   Airway Device Type  --  medium full face mask   Humidifier  --  not applicable   Ipap  --  16   EPAP (cm H2O)  --  8   Pressure Support (cm H2O)  --  8   Set Rate (Breaths/Min)  --  8   ITime (sec)  --  0.75   Rise Time (sec)  --  2   Patient CPAP/BiPAP Settings   Timed Inspiration (Sec)  --  0.75   IPAP Rise Time (sec)  --  2   RR Total (Breaths/Min)  --  17   Tidal Volume (mL)  --  663   VE Minute Ventilation (L/min)  --  11 L/min   Peak Inspiratory Pressure (cm H2O)  --  19   TiTOT (%)  --  29   Total  Leak (L/Min)  --  15   Patient Trigger - ST Mode Only (%)  --  56   CPAP/BiPAP Alarms   High Pressure (cm H2O)  --  30   Low Pressure (cm H2O)  --  10   Low Pressure Delay (Sec)  --  20   Minute Ventilation (L/Min)  --  1.8   High RR (breaths/min)  --  40   Low RR (breaths/min)  --  8   Apnea (Sec)  --  20

## 2019-04-03 NOTE — PROGRESS NOTES
Release of information form signed by verbal consent from patient and faxed to [facility] to obtain prerecorded lab results and cardiology test results. Awaiting fax copies of pt's records.

## 2019-04-03 NOTE — ED NOTES
Patient is resting comfortably. resp e/u. sr per CM. NS infusing at 50ml/hr w/o s/s of iv related complications. nad noted.

## 2019-04-03 NOTE — ED NOTES
Pt spilled urine from urinal in the bed. 100ml remains in urinal. Pt's gown and linens changed. Pt with labored breathing with minimal exertion. Pending [facility] arrival

## 2019-04-03 NOTE — PLAN OF CARE
Problem: Adult Inpatient Plan of Care  Goal: Patient-Specific Goal (Individualization)  Outcome: Ongoing (interventions implemented as appropriate)  Tolerating bipap well with respirations even and non-labored. Overall breathing much better since admit.

## 2019-04-03 NOTE — NURSING
Call placed to [facility] with Dr Diane Arriaza called right back. Clarified all orders but specific to multiple nebulizer treatments. Confirmed original orders as they stand.

## 2019-04-03 NOTE — NURSING
Unable to provide password at this time.  Pt remains on BiPAP.  Received verbal consent from patient that ok to discuss care/update with Jeni Cuadra John or Nelia until such time as PW is set up by pt and family.

## 2019-04-03 NOTE — ED NOTES
Patient is resting comfortably. resp e/u. Skin wdp. nad noted. Pt updated on current transfer status. Spoke with pt's family and updated them as well. Awaiting EMS transport

## 2019-04-03 NOTE — ASSESSMENT & PLAN NOTE
Unable to do Chest CTA due to COSTA  Obtain VQ scan when condition stabilizes  Full dose lovenox x1 until VQ scan results

## 2019-04-03 NOTE — PLAN OF CARE
Problem: Adult Inpatient Plan of Care  Goal: Plan of Care Review  Outcome: Ongoing (interventions implemented as appropriate)  Patient free of falls and injuries this shift. Oriented x4. Follows all commands. Moves all extremities. Very SOB on admit with 02sats in the 70's per 50%denise mask. Placed on 100%NRB, 02 sats up to low 80's and quickly placed on bipap16/8 100%. Sinus on monitor. eICU Dr Diane Arriaza notified. New orders received.  Chao placed with clear, yellow urine and good output.

## 2019-04-03 NOTE — ED NOTES
EDMD notified of pt's current vs. Order received for duoneb. RT called. Physician at bedside for eval

## 2019-04-03 NOTE — ED NOTES
Pt aaox3. Expiratory wheezing noted. Vss. Awaiting acceptance for transfer. Ns infusing at 50ml/hr w/o s/s of iv related complications. sr up x2

## 2019-04-03 NOTE — ED NOTES
Jeni Shabazz, sister in law, stated she would like to be updated when patient leaves.  [phone]

## 2019-04-03 NOTE — NURSING
"Admit to . Full assistance to transfer to ICU bed. Very SOB with noted wheezing. 50% denise mask with sats In the low 70"s and struggling for air. Abdominal breathing.Titrated up to 100% NRB applied. O2 sats up to low 80's and continued to struggle for air. Full assessment done see flow sheet.  "

## 2019-04-03 NOTE — ASSESSMENT & PLAN NOTE
Type unknown  Give another lasix 40mg x1 now for pulmonary congestion  Defer further diuresis to Cardiology  Hold chronic ARB due to COSTA  Not on chronic BB  Obtain TTE  Nitropaste  Consult Cardiology  Monitor on telemetry

## 2019-04-03 NOTE — CONSULTS
Nephrology Consult Note        Patient Name: Duc Spears  MRN: [id number]    Patient Class: IP- Inpatient   Admission Date: 4/3/2019  Length of Stay: 0 days  Date of Service: 4/3/2019    Attending Physician: Mimi Landa MD  Primary Care Provider: Primary Doctor No    Reason for Consult: costa/ckd3    SUBJECTIVE:     HPI: 75M with COPD on 2L home oxygen, CAD (MI ), CHF, HTN, smoker for many years (quit 2 weeks) presented with SOB, wheezing for 2 weeks, with acute worsening 2 days ago. Denies any fever. Renal consulted for COSTA    Past Medical History:   Diagnosis Date    Cancer     skin cancer    Coronary artery disease     Hypertension      Past Surgical History:   Procedure Laterality Date    APPENDECTOMY      TONSILLECTOMY       No family history on file.  Social History     Tobacco Use    Smoking status: Former Smoker     Last attempt to quit: 2011     Years since quittin.0   Substance Use Topics    Alcohol use: Never     Frequency: Never    Drug use: Never       Review of patient's allergies indicates:   Allergen Reactions    Codeine Nausea And Vomiting       Outpatient meds:  Current Facility-Administered Medications on File Prior to Encounter   Medication Dose Route Frequency Provider Last Rate Last Dose    [COMPLETED] 0.9%  NaCl infusion  1,000 mL Intravenous ED 1 Time Adryan White MD 50 mL/hr at 19 1451 1,000 mL at 19 1451    [COMPLETED] albuterol-ipratropium 2.5 mg-0.5 mg/3 mL nebulizer solution 3 mL  3 mL Nebulization ED 1 Time Adryan White MD   3 mL at 19 1340    [COMPLETED] albuterol-ipratropium 2.5 mg-0.5 mg/3 mL nebulizer solution 3 mL  3 mL Nebulization ED 1 Time Adryan White MD   3 mL at 19 1413    [COMPLETED] albuterol-ipratropium 2.5 mg-0.5 mg/3 mL nebulizer solution 3 mL  3 mL Nebulization ED 1 Time Larry Vaca MD   3 mL at 19 1913    [COMPLETED] furosemide injection 40 mg  40 mg Intravenous ED 1 Time  Adryan White MD   40 mg at 04/02/19 1553    [COMPLETED] furosemide injection 40 mg  40 mg Intravenous ED 1 Time Adryan White MD   40 mg at 04/02/19 1716     Current Outpatient Medications on File Prior to Encounter   Medication Sig Dispense Refill    albuterol (VENTOLIN HFA) 90 mcg/actuation inhaler Ventolin HFA 90 mcg/actuation aerosol inhaler      atorvastatin (LIPITOR) 20 MG tablet atorvastatin 20 mg tablet      clopidogrel (PLAVIX) 75 mg tablet clopidogrel 75 mg tablet      cyanocobalamin (VITAMIN B-12) 1000 MCG tablet Vitamin B-12 1,000 mcg tablet   TK 1 T PO QD      escitalopram oxalate (LEXAPRO) 10 MG tablet escitalopram 10 mg tablet      ferrous sulfate (FEOSOL) 325 mg (65 mg iron) Tab tablet ferrous sulfate 325 mg (65 mg iron) tablet   TK 1 T PO TID      folic acid (FOLVITE) 1 MG tablet folic acid 1 mg tablet   TK 1 T PO D      hydroCHLOROthiazide (HYDRODIURIL) 50 MG tablet hydrochlorothiazide 50 mg tablet      hydrOXYzine pamoate (VISTARIL) 25 MG Cap hydroxyzine pamoate 25 mg capsule      losartan-hydrochlorothiazide 100-25 mg (HYZAAR) 100-25 mg per tablet losartan 100 mg-hydrochlorothiazide 25 mg tablet      omeprazole (PRILOSEC) 20 MG capsule omeprazole 20 mg capsule,delayed release      ondansetron (ZOFRAN) 4 MG tablet ondansetron HCl 4 mg tablet      oxybutynin (DITROPAN-XL) 5 MG TR24 oxybutynin chloride ER 5 mg tablet,extended release 24 hr   TK 1 T PO D      pantoprazole (PROTONIX) 40 MG tablet pantoprazole 40 mg tablet,delayed release      polyethylene glycol (GAVILYTE-G) 236-22.74-6.74 -5.86 gram suspension GaviLyte-G 236 gram-22.74 gram-6.74 gram-5.86 gram oral solution      potassium chloride SA (K-DUR,KLOR-CON) 20 MEQ tablet potassium chloride ER 20 mEq tablet,extended release(part/cryst)      pregabalin (LYRICA) 150 MG capsule Lyrica 150 mg capsule      sucralfate (CARAFATE) 1 gram tablet sucralfate 1 gram tablet      sulindac (CLINORIL) 200 MG Tab sulindac  200 mg tablet      HYDROcodone-acetaminophen (NORCO) 7.5-325 mg per tablet hydrocodone 7.5 mg-acetaminophen 325 mg tablet      nitroGLYCERIN (NITROSTAT) 0.4 MG SL tablet nitroglycerin 0.4 mg sublingual tablet      traZODone (DESYREL) 50 MG tablet trazodone 50 mg tablet         Scheduled meds:   budesonide  0.5 mg Nebulization Q12H    cefTRIAXone (ROCEPHIN) IVPB  2 g Intravenous Q24H    doxycycline (VIBRAMYCIN) IVPB  100 mg Intravenous Q12H    furosemide        furosemide  80 mg Intravenous Q8H    ipratropium  0.5 mg Nebulization Q6H    levalbuterol  1.25 mg Nebulization Q12H       Infusions:      PRN meds:  acetaminophen, albuterol-ipratropium, famotidine, ondansetron, ramelteon, senna-docusate 8.6-50 mg, sodium chloride 0.9%    Review of Systems:  Review of Systems   Constitutional: Negative for chills, fever, malaise/fatigue and weight loss.   HENT: Negative for hearing loss and nosebleeds.    Eyes: Negative for blurred vision, double vision and photophobia.   Respiratory: Negative for cough, shortness of breath and wheezing.    Cardiovascular: Negative for chest pain, palpitations and leg swelling.   Gastrointestinal: Negative for abdominal pain, constipation, diarrhea, heartburn, nausea and vomiting.   Genitourinary: Negative for dysuria, frequency and urgency.   Musculoskeletal: Negative for falls, joint pain and myalgias.   Skin: Negative for itching and rash.   Neurological: Negative for dizziness, speech change, focal weakness, loss of consciousness and headaches.   Endo/Heme/Allergies: Does not bruise/bleed easily.   Psychiatric/Behavioral: Negative for depression and substance abuse. The patient is not nervous/anxious.        OBJECTIVE:     Vital Signs and IO (Last 24H):  Temp:  [96.9 °F (36.1 °C)-98.1 °F (36.7 °C)]   Pulse:  [58-80]   Resp:  [10-22]   BP: (112-188)/()   SpO2:  [50 %-100 %]   I/O last 3 completed shifts:  In: 330 [P.O.:30; IV Piggyback:300]  Out: 550 [Urine:550]    Wt  Readings from Last 5 Encounters:   04/03/19 88.3 kg (194 lb 10.7 oz)   04/02/19 86.2 kg (190 lb)         Physical Exam:  Physical Exam   Constitutional: He is oriented to person, place, and time. He appears well-developed and well-nourished.   HENT:   Head: Normocephalic and atraumatic.   Mouth/Throat: Oropharynx is clear and moist.   Eyes: Pupils are equal, round, and reactive to light. EOM are normal. No scleral icterus.   Neck: Neck supple.   Cardiovascular: Normal rate and regular rhythm.   Pulmonary/Chest: Effort normal. No stridor. No respiratory distress.   Abdominal: Soft. He exhibits no distension.   Musculoskeletal: Normal range of motion. He exhibits no edema or deformity.   Neurological: He is alert and oriented to person, place, and time. No cranial nerve deficit.   Skin: Skin is warm and dry. No rash noted. He is not diaphoretic. No erythema.   Psychiatric: He has a normal mood and affect. His behavior is normal.       Body mass index is 29.6 kg/m².    Laboratory:  Recent Labs   Lab 04/02/19  1328 04/03/19  0310    139   K 4.4 4.1    105   CO2 22* 23   BUN 84* 81*   CREATININE 3.4* 3.9*   ESTGFRAFRICA 19.7* 17*   EGFRNONAA 17.0* 14*   CALCIUM 8.5* 9.4   ALBUMIN 3.0*  --    PHOS  --  6.5*       Recent Labs   Lab 04/02/19  1328 04/03/19  0310   WBC 10.24 9.40   HGB 9.1* 9.8*   HCT 30.0* 31.1*    194   MCV 86 81*   MCHC 30.3* 31.5*   MONO 8.9  0.9 9.3  0.9       Recent Labs   Lab 04/02/19  1328   ALKPHOS 88   BILITOT 0.7   PROT 7.5   ALBUMIN 3.0*   ALT 8*   AST 14       ASSESSMENT/PLAN:     Active Hospital Problems    Diagnosis  POA    *Acute on chronic respiratory failure with hypoxia [J96.21]  Yes    CAD (coronary artery disease) [I25.10]  Yes    Acute systolic congestive heart failure [I50.21]  Yes    Essential hypertension [I10]  Yes    COSTA (acute kidney injury) [N17.9]  Yes      Resolved Hospital Problems   No resolved problems to display.     COSTA due to infection, CHF,  obstruction, volume depletion, meds  CKD stage 3?  Hematuria, proteinuria of unclear significance  CAD, CHF exacerbation  COPD exacerbation  No NSAIDs, ACEI/ARB, IV contrast or other nephrotoxins.  Keep MAP > 60, SBP > 100.  Dose meds for GFR < 30 ml/min.  Renal diet - low K, low phos.  Treat any infection.  Cards and pulm consult.    Anemia of CKD  Stable. Monitor.  No need for DIANA.  No need for IV iron.    HTN  Controlled.   Tolerate asymptomatic HTN up to -160.  Continue home meds.  Low sodium diet.    Thank you for allowing us to participate in the care of your patient!   We will follow the patient and provide recommendations as needed.    Fred Adamson MD    [facility]  [address] [place] [address] [place] [address]    [phone] - tel  [phone] - fax    4/3/2019 11:21 AM

## 2019-04-03 NOTE — ED NOTES
Patient is resting comfortably. resp e/u. nad noted. Vss. Awaiting transfer. sr up x2. Will continue to monitor.

## 2019-04-03 NOTE — ASSESSMENT & PLAN NOTE
??Cardiorenal Syndrome  Hold chronic diuretics, ARB, and Sulindac for now  Consult Nephrology  Strict I&O  Avoid NSAIDs/Nephrotoxins  Renal dose medications

## 2019-04-03 NOTE — CONSULTS
[facility]  Cardiology Consult     Patient Name: Duc Spears  MRN: [id number]  Admission Date: 4/3/2019  Attending Physician: Mimi Landa MD   Primary Care Provider: Primary Doctor No           Patient information was obtained from patient, [place] and ER records.      Subjective:      Principal Problem:Acute on chronic respiratory failure with hypoxia     Chief Complaint: No chief complaint on file. Patient is on biPAP.        HPI: Mr. Spears is a 73yo M with a PMH of HTN, Chronic Respiratory failure O2 Dependent, and CAD. He presented to [place] with c/o Dyspnea. It started 2 days ago and got progressively worst. On arrival to [place], he was in respiratory distress and hypoxic. He was found to be in acute CHF and given nebulizers, high flow 02, and a total of 80mg IV Lasix. He was also found to have COSTA. He was transferred to [facility] [place] services. On arrival to ICU, he was in respiratory distress and hypoxic. A 1L bag of IV NS was in progress on his arrival, and about 500cc was left in the bag. The infusion was stopped. Bipap therapy was initiated and he was given another 40mg IV lasix for pulmonary congestion. eICU physician ordered nebulizers. A repeat CXR was done and was essentially unchanged from the one earlier done at [place]. After initiateion of Bipap and a nebulizer treatment, he reports that he is starting to breath better. On Bipap, his SPO2 improved to 90%, up from 70's.           Past Medical History:   Diagnosis Date    Cancer       skin cancer    Coronary artery disease      Hypertension                 Past Surgical History:   Procedure Laterality Date    APPENDECTOMY        TONSILLECTOMY                  Review of patient's allergies indicates:   Allergen Reactions    Codeine Nausea And Vomiting             Current Facility-Administered Medications on File Prior to Encounter   Medication    [COMPLETED] 0.9%  NaCl  infusion    [COMPLETED] albuterol-ipratropium 2.5 mg-0.5 mg/3 mL nebulizer solution 3 mL    [COMPLETED] albuterol-ipratropium 2.5 mg-0.5 mg/3 mL nebulizer solution 3 mL    [COMPLETED] albuterol-ipratropium 2.5 mg-0.5 mg/3 mL nebulizer solution 3 mL    [COMPLETED] furosemide injection 40 mg    [COMPLETED] furosemide injection 40 mg           Current Outpatient Medications on File Prior to Encounter   Medication Sig    albuterol (VENTOLIN HFA) 90 mcg/actuation inhaler Ventolin HFA 90 mcg/actuation aerosol inhaler    atorvastatin (LIPITOR) 20 MG tablet atorvastatin 20 mg tablet    clopidogrel (PLAVIX) 75 mg tablet clopidogrel 75 mg tablet    cyanocobalamin (VITAMIN B-12) 1000 MCG tablet Vitamin B-12 1,000 mcg tablet   TK 1 T PO QD    escitalopram oxalate (LEXAPRO) 10 MG tablet escitalopram 10 mg tablet    ferrous sulfate (FEOSOL) 325 mg (65 mg iron) Tab tablet ferrous sulfate 325 mg (65 mg iron) tablet   TK 1 T PO TID    folic acid (FOLVITE) 1 MG tablet folic acid 1 mg tablet   TK 1 T PO D    hydroCHLOROthiazide (HYDRODIURIL) 50 MG tablet hydrochlorothiazide 50 mg tablet    hydrOXYzine pamoate (VISTARIL) 25 MG Cap hydroxyzine pamoate 25 mg capsule    losartan-hydrochlorothiazide 100-25 mg (HYZAAR) 100-25 mg per tablet losartan 100 mg-hydrochlorothiazide 25 mg tablet    omeprazole (PRILOSEC) 20 MG capsule omeprazole 20 mg capsule,delayed release    ondansetron (ZOFRAN) 4 MG tablet ondansetron HCl 4 mg tablet    oxybutynin (DITROPAN-XL) 5 MG TR24 oxybutynin chloride ER 5 mg tablet,extended release 24 hr   TK 1 T PO D    pantoprazole (PROTONIX) 40 MG tablet pantoprazole 40 mg tablet,delayed release    polyethylene glycol (GAVILYTE-G) 236-22.74-6.74 -5.86 gram suspension GaviLyte-G 236 gram-22.74 gram-6.74 gram-5.86 gram oral solution    potassium chloride SA (K-DUR,KLOR-CON) 20 MEQ tablet potassium chloride ER 20 mEq tablet,extended release(part/cryst)    pregabalin (LYRICA) 150 MG capsule Lyrica 150  mg capsule    sucralfate (CARAFATE) 1 gram tablet sucralfate 1 gram tablet    sulindac (CLINORIL) 200 MG Tab sulindac 200 mg tablet    HYDROcodone-acetaminophen (NORCO) 7.5-325 mg per tablet hydrocodone 7.5 mg-acetaminophen 325 mg tablet    nitroGLYCERIN (NITROSTAT) 0.4 MG SL tablet nitroglycerin 0.4 mg sublingual tablet    traZODone (DESYREL) 50 MG tablet trazodone 50 mg tablet          Family History      Unable to obtain due to patient acuity                Tobacco Use    Smoking status: Former Smoker       Last attempt to quit: 2011       Years since quittin.0   Substance and Sexual Activity    Alcohol use: Never       Frequency: Never    Drug use: Never    Sexual activity: Not on file      Review of Systems   Unable to perform ROS: Acuity of condition      Objective:      Vital Signs (Most Recent):  Temp: 98.1 °F (36.7 °C) (19 0730)  Pulse: 70 (19 1015)  Resp: 15 (19 1015)  BP: 125/62 (19 1015)  SpO2: 97 % (19 1015) Vital Signs (24h Range):  Temp:  [96.9 °F (36.1 °C)-98.1 °F (36.7 °C)] 98.1 °F (36.7 °C)  Pulse:  [58-80] 70  Resp:  [10-22] 15  SpO2:  [50 %-100 %] 97 %  BP: (112-188)/() 125/62      Weight: 88.3 kg (194 lb 10.7 oz)  Body mass index is 29.6 kg/m².     Physical Exam   Constitutional: He is oriented to person, place, and time. He appears distressed.   Moderately distressed due to dyspnea   HENT:   Head: Normocephalic.   Eyes: Pupils are equal, round, and reactive to light.   Neck: Normal range of motion. Neck supple.  JVP is elevated. No tracheal deviation present.   Cardiovascular: Normal rate, regular rhythm, normal heart sounds and intact distal pulses.   Pulmonary/Chest: He is in respiratory distress. He has rhonchi.   Diffuse rhonchi bilaterally   Abdominal: Soft. Bowel sounds are normal. He exhibits no distension. There is no tenderness.   Musculoskeletal: Normal range of motion. He exhibits no edema.   Neurological: He is alert and oriented  to person, place, and time. No cranial nerve deficit.   Skin: Skin is warm, dry and intact. Capillary refill takes less than 2 seconds.            CRANIAL NERVES      CN III, IV, VI   Pupils are equal, round, and reactive to light.        Significant Labs:   ABGs:   Recent Labs   Lab 04/02/19  1426   PH 7.31*   PCO2 46.7*   HCO3 22.80   POCSATURATED 86.9*   BE -3.60*   POCFIO2 40      CBC:        Recent Labs   Lab 04/02/19  1328 04/03/19  0310   WBC 10.24 9.40   HGB 9.1* 9.8*   HCT 30.0* 31.1*    194      CMP:        Recent Labs   Lab 04/02/19  1328 04/03/19  0310    139   K 4.4 4.1    105   CO2 22* 23   * 104   BUN 84* 81*   CREATININE 3.4* 3.9*   CALCIUM 8.5* 9.4   PROT 7.5  --    ALBUMIN 3.0*  --    BILITOT 0.7  --    ALKPHOS 88  --    AST 14  --    ALT 8*  --    ANIONGAP 12 11   EGFRNONAA 17.0* 14*      Cardiac Markers:       Recent Labs   Lab 04/02/19  1328   BNP 1,027*      Urine Studies:       Recent Labs   Lab 04/02/19  2310   COLORU Yellow   APPEARANCEUA Hazy*   PHUR 5.0   SPECGRAV 1.025   PROTEINUA 3+*   GLUCUA Negative   KETONESU Negative   BILIRUBINUA Negative   OCCULTUA 3+*   NITRITE Positive*   UROBILINOGEN Negative   LEUKOCYTESUR 2+*   RBCUA 6*   WBCUA 11*   BACTERIA Few*   SQUAMEPITHEL 2   HYALINECASTS 0            Lab Results   Component Value Date     DDIMER 1580 () 04/02/2019            Significant Imaging:      CXR at [place] 4/2/19 1400:  Reviewed radiologist's report--   Impression       Between small to moderate bilateral pleural effusions.  Bibasilar airspace disease could reflect edema, atelectasis, pneumonia.      Repeat CXR: Reviewed film-- essentially unchanged from earlier  Moderate bilateral pleural effusions. Pulm edema.        Assessment/Plan:      * Acute on chronic respiratory failure with hypoxia  Due to CHF and pleural effusions  Continuous Bipap therapy-- wean as tolerated  Nebulizers  Monitor closely in ICU           Acute  congestive heart failure -  probable volume overload 2/2 COSTA.  Hold chronic ARB due to COSTA; elevated CVP on echo and JVD.  Not on chronic BB  Nitropaste  Normal LVEF; Mild pulm HTN.  Monitor on telemetry  Lasix iv bid ; Lasix infusion if no diuretic response.     COSTA (acute kidney injury)  Hold chronic diuretics, ARB, and Sulindac for now  Strict I&O  Avoid NSAIDs/Nephrotoxins  Renal dose medications           Elevated d-dimer  Unable to do Chest CTA due to COSTA  Obtain VQ scan when condition stabilizes  Full dose lovenox x1 until VQ scan results        CAD (coronary artery disease)  Continue statin and plavix  Not on chronic ASA     Essential hypertension  Chronic/Controlled. Continue chronic medications, adjusting as needed.                  VTE Risk Mitigation (From admission, onward)         Ordered       IP VTE HIGH RISK PATIENT  Once     Full dose Lovenox x1 dose given due to elevated D-dimer 04/03/19 0252       Place TIANA hose  Until discontinued      04/03/19 0252          Critical care time spent on the evaluation and treatment of severe organ dysfunction, review of pertinent labs and imaging studies, discussions with consulting providers and discussions with patient/family: 75 minutes.      ANTHONY King MD [facility]  Cardiology   [facility]

## 2019-04-03 NOTE — ED NOTES
"Call to [facility] to get ETA on transport, spoke with Krista. RN informs her that transport requested was to be a priority 2 transfer. She verbalizes understanding and states "I'll go ahead and upgrade them but I can't make any promises."  "

## 2019-04-03 NOTE — CONSULTS
[facility]  Cardiology  Consult Note    Patient Name: Duc Spears  MRN: [id number]  Admission Date: 4/3/2019  Hospital Length of Stay: 0 days  Code Status: Full Code   Attending Provider: Dr Landa  Consulting Provider: LYLA Martini  Primary Care Physician: Primary Doctor No  Principal Problem:Acute systolic congestive heart failure    Patient information was obtained from patient, past medical records and ER records.     Consults  Subjective:     Chief Complaint: SOB    HPI: Mr Duc Spears is a 73 y/o  gentleman who presented with SOB. It is difficult to obtain much information as he is on BiPap. He indicates that about 5 days ago he started developing SOB and it got progressively worse. He has had CHF in the past. Denies having CP, palpitations and edema. Also has a cardiologist but could not understand the name and location.  He has a hx of COPD, HTN and CHF. He is a smoker having quite several weeks ago and uses home oxygen. Lives with his wife.  Noted on admission to have renal failure and abnormal urinalysis. Also elevated D Dimer at 1580.  EKG shows NSR with occasional PAC's.      Past Medical History:   Diagnosis Date    Cancer     skin cancer    Coronary artery disease     Hypertension        Past Surgical History:   Procedure Laterality Date    APPENDECTOMY      TONSILLECTOMY         Review of patient's allergies indicates:   Allergen Reactions    Codeine Nausea And Vomiting       Current Facility-Administered Medications on File Prior to Encounter   Medication    [COMPLETED] 0.9%  NaCl infusion    [COMPLETED] albuterol-ipratropium 2.5 mg-0.5 mg/3 mL nebulizer solution 3 mL    [COMPLETED] albuterol-ipratropium 2.5 mg-0.5 mg/3 mL nebulizer solution 3 mL    [COMPLETED] albuterol-ipratropium 2.5 mg-0.5 mg/3 mL nebulizer solution 3 mL    [COMPLETED] furosemide injection 40 mg    [COMPLETED] furosemide injection 40 mg     Current Outpatient Medications on  File Prior to Encounter   Medication Sig    albuterol (VENTOLIN HFA) 90 mcg/actuation inhaler Ventolin HFA 90 mcg/actuation aerosol inhaler    atorvastatin (LIPITOR) 20 MG tablet atorvastatin 20 mg tablet    clopidogrel (PLAVIX) 75 mg tablet clopidogrel 75 mg tablet    cyanocobalamin (VITAMIN B-12) 1000 MCG tablet Vitamin B-12 1,000 mcg tablet   TK 1 T PO QD    escitalopram oxalate (LEXAPRO) 10 MG tablet escitalopram 10 mg tablet    ferrous sulfate (FEOSOL) 325 mg (65 mg iron) Tab tablet ferrous sulfate 325 mg (65 mg iron) tablet   TK 1 T PO TID    folic acid (FOLVITE) 1 MG tablet folic acid 1 mg tablet   TK 1 T PO D    hydroCHLOROthiazide (HYDRODIURIL) 50 MG tablet hydrochlorothiazide 50 mg tablet    hydrOXYzine pamoate (VISTARIL) 25 MG Cap hydroxyzine pamoate 25 mg capsule    losartan-hydrochlorothiazide 100-25 mg (HYZAAR) 100-25 mg per tablet losartan 100 mg-hydrochlorothiazide 25 mg tablet    omeprazole (PRILOSEC) 20 MG capsule omeprazole 20 mg capsule,delayed release    ondansetron (ZOFRAN) 4 MG tablet ondansetron HCl 4 mg tablet    oxybutynin (DITROPAN-XL) 5 MG TR24 oxybutynin chloride ER 5 mg tablet,extended release 24 hr   TK 1 T PO D    pantoprazole (PROTONIX) 40 MG tablet pantoprazole 40 mg tablet,delayed release    polyethylene glycol (GAVILYTE-G) 236-22.74-6.74 -5.86 gram suspension GaviLyte-G 236 gram-22.74 gram-6.74 gram-5.86 gram oral solution    potassium chloride SA (K-DUR,KLOR-CON) 20 MEQ tablet potassium chloride ER 20 mEq tablet,extended release(part/cryst)    pregabalin (LYRICA) 150 MG capsule Lyrica 150 mg capsule    sucralfate (CARAFATE) 1 gram tablet sucralfate 1 gram tablet    sulindac (CLINORIL) 200 MG Tab sulindac 200 mg tablet    HYDROcodone-acetaminophen (NORCO) 7.5-325 mg per tablet hydrocodone 7.5 mg-acetaminophen 325 mg tablet    nitroGLYCERIN (NITROSTAT) 0.4 MG SL tablet nitroglycerin 0.4 mg sublingual tablet    traZODone (DESYREL) 50 MG tablet trazodone 50 mg  tablet     Family History     None        Tobacco Use    Smoking status: Former Smoker     Last attempt to quit: 2011     Years since quittin.0   Substance and Sexual Activity    Alcohol use: Never     Frequency: Never    Drug use: Never    Sexual activity: Not on file     Review of Systems   Constitution: Negative.   HENT: Negative.    Eyes: Negative.    Cardiovascular: Positive for dyspnea on exertion and orthopnea. Negative for chest pain and leg swelling.   Respiratory: Positive for cough and shortness of breath.    Skin: Negative.    Musculoskeletal: Negative.    Gastrointestinal: Negative.    Genitourinary: Negative.    Neurological: Negative.    Psychiatric/Behavioral: Negative.         Objective:     Vital Signs (Most Recent):  Temp: 98.1 °F (36.7 °C) (19)  Pulse: 80 (19)  Resp: 16 (19)  BP: (!) 115/56 (19)  SpO2: (!) 93 % (19) Vital Signs (24h Range):  Temp:  [96.9 °F (36.1 °C)-98.1 °F (36.7 °C)] 98.1 °F (36.7 °C)  Pulse:  [58-80] 80  Resp:  [10-22] 16  SpO2:  [50 %-100 %] 93 %  BP: (112-188)/() 115/56     Weight: 88.3 kg (194 lb 10.7 oz)  Body mass index is 29.6 kg/m².    SpO2: (!) 93 %  O2 Device (Oxygen Therapy): BiPAP      Intake/Output Summary (Last 24 hours) at 4/3/2019 0836  Last data filed at 4/3/2019 0737  Gross per 24 hour   Intake 330 ml   Output 625 ml   Net -295 ml       Lines/Drains/Airways     Drain                 Urethral Catheter 19 0315 Non-latex less than 1 day          Peripheral Intravenous Line                 Peripheral IV - Single Lumen 19 1322 Left Antecubital less than 1 day         Peripheral IV - Single Lumen 19 2221 Right Antecubital less than 1 day                Physical Exam   Constitutional: He is oriented to person, place, and time. He appears well-developed and well-nourished.   Somewhat sleepy.   HENT:   Head: Normocephalic.   Eyes: Pupils are equal, round, and reactive to light.  Conjunctivae are normal.   Neck: Normal range of motion. Neck supple. No JVD present. No thyromegaly present.   Cardiovascular: Normal rate, regular rhythm, normal heart sounds and intact distal pulses.   No murmur heard.  Pulmonary/Chest:   On BiPap with decreased BS bases and crackles throughout.   Abdominal: Soft. Bowel sounds are normal.   Musculoskeletal: Normal range of motion. He exhibits no edema.   Neurological: He is alert and oriented to person, place, and time.   Skin: Skin is warm and dry.   Psychiatric: He has a normal mood and affect. His behavior is normal. Thought content normal.       Significant Labs:   CMP   Recent Labs   Lab 04/02/19  1328 04/03/19  0310    139   K 4.4 4.1    105   CO2 22* 23   * 104   BUN 84* 81*   CREATININE 3.4* 3.9*   CALCIUM 8.5* 9.4   PROT 7.5  --    ALBUMIN 3.0*  --    BILITOT 0.7  --    ALKPHOS 88  --    AST 14  --    ALT 8*  --    ANIONGAP 12 11   ESTGFRAFRICA 19.7* 17*   EGFRNONAA 17.0* 14*   , CBC   Recent Labs   Lab 04/02/19  1328 04/03/19  0310   WBC 10.24 9.40   HGB 9.1* 9.8*   HCT 30.0* 31.1*    194    and Troponin   Recent Labs   Lab 04/03/19  0310   TROPONINI 0.017   BNP0 - 99 pg/mL1,027High    [id number] - 400 ng/[id number] Panic    Phosphorus2.7 - 4.5 mg/dL6.5High    Magnesium1.6 - 2.6 mg/dL2.5       Significant Imaging:   C Xray  1. Bilateral pleural effusions between small to moderate in size.  2. Bilateral airspace disease which could reflect atelectasis, pneumonia, pulmonary edema, acute lung injury/ARDS.  3. Cardiomegaly.  VQ Scan pending  Echo pending    Assessment and Plan:     Acute on Chronic respiratory failure  On BiPap    CHF  Lasix IV, await Echo results.    COPD  On steroids and home oxygen    HTN  Continue home meds, monitor BP's    Tobacco abuse  Smoking cessation    Renal failure  Nephrology consulted    Active Diagnoses:    Diagnosis Date Noted POA    PRINCIPAL PROBLEM:  Acute systolic congestive heart failure  [I50.21] 04/02/2019 Yes    Essential hypertension [I10] 04/02/2019 Yes    Acute on chronic respiratory failure with hypoxia [J96.21] 04/02/2019 Yes    COSTA (acute kidney injury) [N17.9] 04/02/2019 Yes      Problems Resolved During this Admission:       VTE Risk Mitigation (From admission, onward)        Ordered     IP VTE HIGH RISK PATIENT  Once      04/03/19 0252     Place TIANA hose  Until discontinued      04/03/19 0252          Thank you for your consult.     Yulia Vang, MIRIAMP  Cardiology   [facility]

## 2019-04-03 NOTE — CONSULTS
"  2019      Admit Date: 4/3/2019  Duc Spears  New Patient Consult    No chief complaint on file.    Cc I resp failure    History of Present Illness:   History difficult with pt on niv currently .  Pt worked , smoked 2ppd, lives with wife, does chores, mi in ?, uses 02 for sleep due to sleep apnea.  Pt relates had sl yellow mucous with onset sob acutely yesterday.  Pt pesented to emergency room with complaint of 2 days of dyspnea with progressive worsening.  Patient was found to have some sort of renal failure and evidence to suggest congestive heart failure.  Patient was placed on noninvasive ventilator and he is transferred to [facility].    Pt cannot give further history.        PFSH:  Past Medical History:   Diagnosis Date    Cancer     skin cancer    Coronary artery disease     Hypertension      Past Surgical History:   Procedure Laterality Date    APPENDECTOMY      TONSILLECTOMY       Social History     Tobacco Use    Smoking status: Former Smoker     Last attempt to quit: 2011     Years since quittin.0   Substance Use Topics    Alcohol use: Never     Frequency: Never    Drug use: Never     History reviewed. No pertinent family history.  Review of patient's allergies indicates:   Allergen Reactions    Codeine Nausea And Vomiting       Performance Status:Performance Status:The patient's activity level is functions out of house.    Review of Systems:  due to neurologic status/impairments a Review of Systems could not be obtained -patient on noninvasive ventilator and has difficulty responding reliably to any questions- see above.      Exam:Comprehensive exam done. BP (!) 170/74   Pulse 80   Temp 98 °F (36.7 °C) (Axillary)   Resp (!) 25   Ht 5' 8" (1.727 m)   Wt 88.3 kg (194 lb 10.7 oz)   SpO2 96%   BMI 29.60 kg/m²   Exam included Vitals as listed, and patient's appearance and affect and alertness and mood, oral exam for yeast and hygiene and pharynx " lesions and Mallapatti (M) score, neck with inspection for jvd and masses and thyroid abnormalities and lymph nodes (supraclavicular and infraclavicular nodes also examined and noted if abn), chest exam included symmetry and effort and fremitus and percussion and auscultation, cardiac exam included rhythm and gallops and murmur and rubs and jvd and edema, abdominal exam for mass and hepatosplenomegaly and tenderness and hernias and bowel sounds, Musculoskeletal exam with muscle tone and posture and mobility/gait and  strenght, and skin for rashes and cyanosis and pallor and turgor, extremity for clubbing.  Findings were normal except as listed below:  Noninvasive ventilator in place.,M4,chest is symmetric, mild distress on noninvasive ventilator, normal percussion, normal fremitus and decreased breath sounds and minimal rales.  No clubbing.  Soft abdomen.  No edema.  Arouses.  Mild respiratory distress suggested    Radiographs reviewed: view by direct vision -chest x-ray looks like severe emphysema in the right upper lung and severe dense pulmonary edema bilaterally particularly in the lower lungs. Aorta calcified   No results found for this or any previous visit.]    Labs     Recent Labs   Lab 04/03/19  0310   WBC 9.40   HGB 9.8*   HCT 31.1*        Recent Labs   Lab 04/03/19  0310 04/03/19  0343 04/03/19  0903     --   --    K 4.1  --   --      --   --    CO2 23  --   --    BUN 81*  --   --    CREATININE 3.9*  --   --      --   --    CALCIUM 9.4  --   --    MG 2.5  --   --    PHOS 6.5*  --   --    PROCAL  --  0.12  --    TROPONINI 0.017  --  <0.006     No results for input(s): PH, PCO2, PO2, HCO3 in the last 24 hours.  Microbiology Results (last 7 days)     Procedure Component Value Units Date/Time    Blood culture [[id number]] Collected:  04/03/19 0343    Order Status:  Sent Specimen:  Blood Updated:  04/03/19 1019    Culture, Respiratory with Gram Stain [[id number]]     Order Status:   No result Specimen:  Respiratory       echo 4/3/19-Duc Spears   Transthoracic echo (TTE) complete with contrast   Order# [id number]   Reading physician: Shahram King MD Ordering physician: Layne Stevens NP Study date: 4/3/19   Patient Information     Name MRN Description   Duc Spears [id number] 74 y.o. Male   Performed Procedure     TRANSTHORACIC ECHO (TTE) COMPLETE W/ CONTRAST   Conclusion     · Concentric left ventricular hypertrophy.  · Normal left ventricular systolic function. The estimated ejection fraction is 58%  · Grade III (severe) left ventricular diastolic dysfunction consistent with restrictive physiology.  · Elevated left atrial pressure.  · Low normal right ventricular systolic function.  · Moderate left atrial enlargement.  · Mild right atrial enlargement.  · Mild mitral regurgitation.  · Mild tricuspid regurgitation.  · Elevated central venous pressure (15 mm Hg).  · The estimated PA systolic pressure is 43 mm Hg  · Pulmonary hypertension present.  · There is a moderate left pleural effusion.  · Pericardial effusion.         Impression:  Active Hospital Problems    Diagnosis  POA    *Acute on chronic respiratory failure with hypoxia [J96.21]  Yes    CAD (coronary artery disease) [I25.10]  Yes    Elevated d-dimer [R79.89]  Yes    COPD exacerbation [J44.1]  Yes    Calcification of aorta [I70.0]  Yes    Acute pulmonary edema [J81.0]  Yes    Acute diastolic congestive heart failure [I50.31]  Yes    Essential hypertension [I10]  Yes    COSTA (acute kidney injury) [N17.9]  Yes      Resolved Hospital Problems   No resolved problems to display.     Plan:   Will increase lasix to 80 q 8, renal failure may make rx diastolic chf hard, support with niv/ox til stable, rx copd. Avoid steroids.     The following were evaluated and adjusted as needed: mechanical ventilator settings and weaning status, intravenous fluids and nutritional status, support tubes and access lines and  invasive monitoring, acid base balance and oxygenation needs and input and output and renal status       Critical Care  - THE PATIENT HAS A HIGH PROBABILITY OF IMMINENT OR LIFE THREATENING DETERIORATION.  Over 50%time of encounter was in direct care at bedside.  Time was 30 to 74 minutes required for patient care.  Time needed for all of the above totaled 34 minutes.

## 2019-04-03 NOTE — EICU
eICU Brief Admission Note       Patient seen over video : yes   Chart reviewed : yes  Spoke with RN at bedside : yes      Briefly, 75 y/M with h/o COPD on 2L home Oxygen, CAD (MI 1990), CHF, HTN, smoker for many years (quit 2 weeks) presented with SOB , wheezing 2 weeks, with acute worsening 2 days. Denies any fever.     Video assessment :  In moderate respiratory distress, placed on BiPAP now       Vitals reviewed   Afebrile, stable vitals     LABs reviewed   Hb 9.1  D-dimer 1.5K   BNP 1K   Cr 3.4   UA 11 WBC   ABG - CO2 retention, P/F 152    Radiology reviewed   CXR  Between small to moderate bilateral pleural effusions.  Bibasilar airspace disease could reflect edema, atelectasis, pneumonia.        Assessment / Plan :  # acute on chronic hypoxic hypercapnic respiratory failure   # likely CHF/COPD exacerbation vs underlying CAP -- to r/o PE underlying   # CAD   # HTN   # smoker   - continue BiPAP 12/5  for now , monitor closely   - s/p Lasix   - ABX : Doxycycline + ceftriaxoe; may DC once Pneumonia ruled out   - TTE ; may consider V/Q scan   - COPD management : duonebs, systemic steroids, ICS/LABA, LAMA, Doxycycline   - continue home meds   - send cultures   - agree with rest of the management plan as per primary team       DVT Px : Lovenox SQ   GI Px : not indicated

## 2019-04-03 NOTE — ED NOTES
Per Desiree at [facility], pt accepted to [facility] ICU but bed is not ready to be assigned yet. States she will call back when bed ready

## 2019-04-03 NOTE — NURSING
Able to scoot self up in bed independently.  Attempted to take patient off BiPAP.  sats decreased to 80% with 2-3L increased oxygen and still unable to maintain.  Expectorated mod amount of greenish/yellow mucous.  Able to swallow water and pills.  BiPAP placed back on patient and sats increasing to 93%

## 2019-04-03 NOTE — SUBJECTIVE & OBJECTIVE
Past Medical History:   Diagnosis Date    Cancer     skin cancer    Coronary artery disease     Hypertension        Past Surgical History:   Procedure Laterality Date    APPENDECTOMY      TONSILLECTOMY         Review of patient's allergies indicates:   Allergen Reactions    Codeine Nausea And Vomiting       Current Facility-Administered Medications on File Prior to Encounter   Medication    [COMPLETED] 0.9%  NaCl infusion    [COMPLETED] albuterol-ipratropium 2.5 mg-0.5 mg/3 mL nebulizer solution 3 mL    [COMPLETED] albuterol-ipratropium 2.5 mg-0.5 mg/3 mL nebulizer solution 3 mL    [COMPLETED] albuterol-ipratropium 2.5 mg-0.5 mg/3 mL nebulizer solution 3 mL    [COMPLETED] furosemide injection 40 mg    [COMPLETED] furosemide injection 40 mg     Current Outpatient Medications on File Prior to Encounter   Medication Sig    albuterol (VENTOLIN HFA) 90 mcg/actuation inhaler Ventolin HFA 90 mcg/actuation aerosol inhaler    atorvastatin (LIPITOR) 20 MG tablet atorvastatin 20 mg tablet    clopidogrel (PLAVIX) 75 mg tablet clopidogrel 75 mg tablet    cyanocobalamin (VITAMIN B-12) 1000 MCG tablet Vitamin B-12 1,000 mcg tablet   TK 1 T PO QD    escitalopram oxalate (LEXAPRO) 10 MG tablet escitalopram 10 mg tablet    ferrous sulfate (FEOSOL) 325 mg (65 mg iron) Tab tablet ferrous sulfate 325 mg (65 mg iron) tablet   TK 1 T PO TID    folic acid (FOLVITE) 1 MG tablet folic acid 1 mg tablet   TK 1 T PO D    hydroCHLOROthiazide (HYDRODIURIL) 50 MG tablet hydrochlorothiazide 50 mg tablet    hydrOXYzine pamoate (VISTARIL) 25 MG Cap hydroxyzine pamoate 25 mg capsule    losartan-hydrochlorothiazide 100-25 mg (HYZAAR) 100-25 mg per tablet losartan 100 mg-hydrochlorothiazide 25 mg tablet    omeprazole (PRILOSEC) 20 MG capsule omeprazole 20 mg capsule,delayed release    ondansetron (ZOFRAN) 4 MG tablet ondansetron HCl 4 mg tablet    oxybutynin (DITROPAN-XL) 5 MG TR24 oxybutynin chloride ER 5 mg tablet,extended  release 24 hr   TK 1 T PO D    pantoprazole (PROTONIX) 40 MG tablet pantoprazole 40 mg tablet,delayed release    polyethylene glycol (GAVILYTE-G) 236-22.74-6.74 -5.86 gram suspension GaviLyte-G 236 gram-22.74 gram-6.74 gram-5.86 gram oral solution    potassium chloride SA (K-DUR,KLOR-CON) 20 MEQ tablet potassium chloride ER 20 mEq tablet,extended release(part/cryst)    pregabalin (LYRICA) 150 MG capsule Lyrica 150 mg capsule    sucralfate (CARAFATE) 1 gram tablet sucralfate 1 gram tablet    sulindac (CLINORIL) 200 MG Tab sulindac 200 mg tablet    HYDROcodone-acetaminophen (NORCO) 7.5-325 mg per tablet hydrocodone 7.5 mg-acetaminophen 325 mg tablet    nitroGLYCERIN (NITROSTAT) 0.4 MG SL tablet nitroglycerin 0.4 mg sublingual tablet    traZODone (DESYREL) 50 MG tablet trazodone 50 mg tablet     Family History     Unable to obtain due to patient acuity        Tobacco Use    Smoking status: Former Smoker     Last attempt to quit: 2011     Years since quittin.0   Substance and Sexual Activity    Alcohol use: Never     Frequency: Never    Drug use: Never    Sexual activity: Not on file     Review of Systems   Unable to perform ROS: Acuity of condition     Objective:     Vital Signs (Most Recent):  Temp: 98.1 °F (36.7 °C) (19 0730)  Pulse: 70 (19 1015)  Resp: 15 (19 1015)  BP: 125/62 (19 1015)  SpO2: 97 % (19 1015) Vital Signs (24h Range):  Temp:  [96.9 °F (36.1 °C)-98.1 °F (36.7 °C)] 98.1 °F (36.7 °C)  Pulse:  [58-80] 70  Resp:  [10-22] 15  SpO2:  [50 %-100 %] 97 %  BP: (112-188)/() 125/62     Weight: 88.3 kg (194 lb 10.7 oz)  Body mass index is 29.6 kg/m².    Physical Exam   Constitutional: He is oriented to person, place, and time. He appears distressed.   Moderately distressed due to dyspnea   HENT:   Head: Normocephalic.   Eyes: Pupils are equal, round, and reactive to light.   Neck: Normal range of motion. Neck supple. No JVD present. No tracheal deviation  present.   Cardiovascular: Normal rate, regular rhythm, normal heart sounds and intact distal pulses.   Pulmonary/Chest: He is in respiratory distress. He has rhonchi.   Diffuse rhonchi bilaterally   Abdominal: Soft. Bowel sounds are normal. He exhibits no distension. There is no tenderness.   Musculoskeletal: Normal range of motion. He exhibits no edema.   Neurological: He is alert and oriented to person, place, and time. No cranial nerve deficit.   Skin: Skin is warm, dry and intact. Capillary refill takes less than 2 seconds.         CRANIAL NERVES     CN III, IV, VI   Pupils are equal, round, and reactive to light.       Significant Labs:   ABGs:   Recent Labs   Lab 04/02/19  1426   PH 7.31*   PCO2 46.7*   HCO3 22.80   POCSATURATED 86.9*   BE -3.60*   POCFIO2 40     CBC:   Recent Labs   Lab 04/02/19  1328 04/03/19  0310   WBC 10.24 9.40   HGB 9.1* 9.8*   HCT 30.0* 31.1*    194     CMP:   Recent Labs   Lab 04/02/19  1328 04/03/19  0310    139   K 4.4 4.1    105   CO2 22* 23   * 104   BUN 84* 81*   CREATININE 3.4* 3.9*   CALCIUM 8.5* 9.4   PROT 7.5  --    ALBUMIN 3.0*  --    BILITOT 0.7  --    ALKPHOS 88  --    AST 14  --    ALT 8*  --    ANIONGAP 12 11   EGFRNONAA 17.0* 14*     Cardiac Markers:   Recent Labs   Lab 04/02/19  1328   BNP 1,027*     Urine Studies:   Recent Labs   Lab 04/02/19  2310   COLORU Yellow   APPEARANCEUA Hazy*   PHUR 5.0   SPECGRAV 1.025   PROTEINUA 3+*   GLUCUA Negative   KETONESU Negative   BILIRUBINUA Negative   OCCULTUA 3+*   NITRITE Positive*   UROBILINOGEN Negative   LEUKOCYTESUR 2+*   RBCUA 6*   WBCUA 11*   BACTERIA Few*   SQUAMEPITHEL 2   HYALINECASTS 0     Lab Results   Component Value Date    DDIMER 1580 () 04/02/2019         Significant Imaging:     CXR at [place] 4/2/19 1400:  Reviewed radiologist's report--   Impression     Between small to moderate bilateral pleural effusions.  Bibasilar airspace disease could reflect edema, atelectasis, pneumonia.      Repeat CXR: Reviewed film-- essentially unchanged from earlier

## 2019-04-03 NOTE — ASSESSMENT & PLAN NOTE
Due to CHF and pleural effusions  Continuous Bipap therapy-- wean as tolerated  Nebulizers  Monitor closely in ICU

## 2019-04-03 NOTE — ED NOTES
Abran Olmeod at [facility], pt assigned to ICU/CCU 509A at [facility], [phone], accepted by Dr. Mimi Landa

## 2019-04-03 NOTE — PLAN OF CARE
Met with patient at bedside.  PCP is Dr. Bennett. Has home oxygen (2L/min per [place])- does not know name of oxygen company. Jeni Shabazz - sister in law [phone].  Discharge plan to home at this time.      04/03/19 1242   Discharge Assessment   Assessment Type Discharge Planning Assessment   Confirmed/corrected address and phone number on facesheet? Yes   Assessment information obtained from? Patient   Expected Length of Stay (days) 3   Communicated expected length of stay with patient/caregiver yes   Prior to hospitilization cognitive status: Alert/Oriented   Prior to hospitalization functional status: Independent   Current cognitive status: Alert/Oriented   Current Functional Status: Independent   Lives With alone   Able to Return to Prior Arrangements yes   Is patient able to care for self after discharge? Yes   Patient's perception of discharge disposition home or selfcare   Readmission Within the Last 30 Days no previous admission in last 30 days   Patient currently being followed by outpatient case management? No   Patient currently receives any other outside agency services? No   Equipment Currently Used at Home oxygen   Discharge Plan A Home   Discharge Plan B Home Health   Patient/Family in Agreement with Plan yes

## 2019-04-03 NOTE — H&P
[facility] Medicine  History & Physical    Patient Name: Duc Spears  MRN: [id number]  Admission Date: 4/3/2019  Attending Physician: Mimi Landa MD   Primary Care Provider: Primary Doctor No         Patient information was obtained from patient, [place] and ER records.     Subjective:     Principal Problem:Acute on chronic respiratory failure with hypoxia    Chief Complaint: No chief complaint on file.       HPI: Mr. Spears is a 75yo M with a PMH of HTN, Chronic Respiratory failure O2 Dependent, and CAD. He presented to [place] with c/o Dyspnea. It started 2 days ago and got progressively worst. On arrival to [place], he was in respiratory distress and hypoxic. He was found to be in acute CHF and given nebulizers, high flow 02, and a total of 80mg IV Lasix. He was also found to have COSTA. He was transferred to [facility] for Nephrology services. On arrival to ICU, he was in respiratory distress and hypoxic. A 1L bag of IV NS was in progress on his arrival, and about 500cc was left in the bag. The infusion was stopped. Bipap therapy was initiated and he was given another 40mg IV lasix for pulmonary congestion. eICU physician ordered nebulizers. A repeat CXR was done and was essentially unchanged from the one earlier done at [place]. After initiateion of Bipap and a nebulizer treatment, he reports that he is starting to breath better. On Bipap, his SPO2 improved to 90%, up from 70's.     Past Medical History:   Diagnosis Date    Cancer     skin cancer    Coronary artery disease     Hypertension        Past Surgical History:   Procedure Laterality Date    APPENDECTOMY      TONSILLECTOMY         Review of patient's allergies indicates:   Allergen Reactions    Codeine Nausea And Vomiting       Current Facility-Administered Medications on File Prior to Encounter   Medication    [COMPLETED] 0.9%  NaCl infusion    [COMPLETED] albuterol-ipratropium 2.5 mg-0.5 mg/3 mL nebulizer  solution 3 mL    [COMPLETED] albuterol-ipratropium 2.5 mg-0.5 mg/3 mL nebulizer solution 3 mL    [COMPLETED] albuterol-ipratropium 2.5 mg-0.5 mg/3 mL nebulizer solution 3 mL    [COMPLETED] furosemide injection 40 mg    [COMPLETED] furosemide injection 40 mg     Current Outpatient Medications on File Prior to Encounter   Medication Sig    albuterol (VENTOLIN HFA) 90 mcg/actuation inhaler Ventolin HFA 90 mcg/actuation aerosol inhaler    atorvastatin (LIPITOR) 20 MG tablet atorvastatin 20 mg tablet    clopidogrel (PLAVIX) 75 mg tablet clopidogrel 75 mg tablet    cyanocobalamin (VITAMIN B-12) 1000 MCG tablet Vitamin B-12 1,000 mcg tablet   TK 1 T PO QD    escitalopram oxalate (LEXAPRO) 10 MG tablet escitalopram 10 mg tablet    ferrous sulfate (FEOSOL) 325 mg (65 mg iron) Tab tablet ferrous sulfate 325 mg (65 mg iron) tablet   TK 1 T PO TID    folic acid (FOLVITE) 1 MG tablet folic acid 1 mg tablet   TK 1 T PO D    hydroCHLOROthiazide (HYDRODIURIL) 50 MG tablet hydrochlorothiazide 50 mg tablet    hydrOXYzine pamoate (VISTARIL) 25 MG Cap hydroxyzine pamoate 25 mg capsule    losartan-hydrochlorothiazide 100-25 mg (HYZAAR) 100-25 mg per tablet losartan 100 mg-hydrochlorothiazide 25 mg tablet    omeprazole (PRILOSEC) 20 MG capsule omeprazole 20 mg capsule,delayed release    ondansetron (ZOFRAN) 4 MG tablet ondansetron HCl 4 mg tablet    oxybutynin (DITROPAN-XL) 5 MG TR24 oxybutynin chloride ER 5 mg tablet,extended release 24 hr   TK 1 T PO D    pantoprazole (PROTONIX) 40 MG tablet pantoprazole 40 mg tablet,delayed release    polyethylene glycol (GAVILYTE-G) 236-22.74-6.74 -5.86 gram suspension GaviLyte-G 236 gram-22.74 gram-6.74 gram-5.86 gram oral solution    potassium chloride SA (K-DUR,KLOR-CON) 20 MEQ tablet potassium chloride ER 20 mEq tablet,extended release(part/cryst)    pregabalin (LYRICA) 150 MG capsule Lyrica 150 mg capsule    sucralfate (CARAFATE) 1 gram tablet sucralfate 1 gram tablet     sulindac (CLINORIL) 200 MG Tab sulindac 200 mg tablet    HYDROcodone-acetaminophen (NORCO) 7.5-325 mg per tablet hydrocodone 7.5 mg-acetaminophen 325 mg tablet    nitroGLYCERIN (NITROSTAT) 0.4 MG SL tablet nitroglycerin 0.4 mg sublingual tablet    traZODone (DESYREL) 50 MG tablet trazodone 50 mg tablet     Family History     Unable to obtain due to patient acuity        Tobacco Use    Smoking status: Former Smoker     Last attempt to quit: 2011     Years since quittin.0   Substance and Sexual Activity    Alcohol use: Never     Frequency: Never    Drug use: Never    Sexual activity: Not on file     Review of Systems   Unable to perform ROS: Acuity of condition     Objective:     Vital Signs (Most Recent):  Temp: 98.1 °F (36.7 °C) (19 0730)  Pulse: 70 (19 1015)  Resp: 15 (19 1015)  BP: 125/62 (19 1015)  SpO2: 97 % (19 1015) Vital Signs (24h Range):  Temp:  [96.9 °F (36.1 °C)-98.1 °F (36.7 °C)] 98.1 °F (36.7 °C)  Pulse:  [58-80] 70  Resp:  [10-22] 15  SpO2:  [50 %-100 %] 97 %  BP: (112-188)/() 125/62     Weight: 88.3 kg (194 lb 10.7 oz)  Body mass index is 29.6 kg/m².    Physical Exam   Constitutional: He is oriented to person, place, and time. He appears distressed.   Moderately distressed due to dyspnea   HENT:   Head: Normocephalic.   Eyes: Pupils are equal, round, and reactive to light.   Neck: Normal range of motion. Neck supple. No JVD present. No tracheal deviation present.   Cardiovascular: Normal rate, regular rhythm, normal heart sounds and intact distal pulses.   Pulmonary/Chest: He is in respiratory distress. He has rhonchi.   Diffuse rhonchi bilaterally   Abdominal: Soft. Bowel sounds are normal. He exhibits no distension. There is no tenderness.   Musculoskeletal: Normal range of motion. He exhibits no edema.   Neurological: He is alert and oriented to person, place, and time. No cranial nerve deficit.   Skin: Skin is warm, dry and intact. Capillary  refill takes less than 2 seconds.         CRANIAL NERVES     CN III, IV, VI   Pupils are equal, round, and reactive to light.       Significant Labs:   ABGs:   Recent Labs   Lab 04/02/19  1426   PH 7.31*   PCO2 46.7*   HCO3 22.80   POCSATURATED 86.9*   BE -3.60*   POCFIO2 40     CBC:   Recent Labs   Lab 04/02/19  1328 04/03/19  0310   WBC 10.24 9.40   HGB 9.1* 9.8*   HCT 30.0* 31.1*    194     CMP:   Recent Labs   Lab 04/02/19  1328 04/03/19  0310    139   K 4.4 4.1    105   CO2 22* 23   * 104   BUN 84* 81*   CREATININE 3.4* 3.9*   CALCIUM 8.5* 9.4   PROT 7.5  --    ALBUMIN 3.0*  --    BILITOT 0.7  --    ALKPHOS 88  --    AST 14  --    ALT 8*  --    ANIONGAP 12 11   EGFRNONAA 17.0* 14*     Cardiac Markers:   Recent Labs   Lab 04/02/19  1328   BNP 1,027*     Urine Studies:   Recent Labs   Lab 04/02/19  2310   COLORU Yellow   APPEARANCEUA Hazy*   PHUR 5.0   SPECGRAV 1.025   PROTEINUA 3+*   GLUCUA Negative   KETONESU Negative   BILIRUBINUA Negative   OCCULTUA 3+*   NITRITE Positive*   UROBILINOGEN Negative   LEUKOCYTESUR 2+*   RBCUA 6*   WBCUA 11*   BACTERIA Few*   SQUAMEPITHEL 2   HYALINECASTS 0     Lab Results   Component Value Date    DDIMER 1580 () 04/02/2019         Significant Imaging:     CXR at [place] 4/2/19 1400:  Reviewed radiologist's report--   Impression     Between small to moderate bilateral pleural effusions.  Bibasilar airspace disease could reflect edema, atelectasis, pneumonia.     Repeat CXR: Reviewed film-- essentially unchanged from earlier      Assessment/Plan:     * Acute on chronic respiratory failure with hypoxia  Due to CHF and pleural effusions  Continuous Bipap therapy-- wean as tolerated  Nebulizers  Monitor closely in ICU        Acute systolic congestive heart failure  Type unknown  Give another lasix 40mg x1 now for pulmonary congestion  Defer further diuresis to Cardiology  Hold chronic ARB due to COSTA  Not on chronic BB  Obtain TTE  Nitropaste  Consult  Cardiology  Monitor on telemetry    COSTA (acute kidney injury)  ??Cardiorenal Syndrome  Hold chronic diuretics, ARB, and Sulindac for now  Consult Nephrology  Strict I&O  Avoid NSAIDs/Nephrotoxins  Renal dose medications        Elevated d-dimer  Unable to do Chest CTA due to COSTA  Obtain VQ scan when condition stabilizes  Full dose lovenox x1 until VQ scan results      CAD (coronary artery disease)  Continue statin and plavix  Not on chronic ASA    Essential hypertension  Chronic/Controlled. Continue chronic medications, adjusting as needed.        VTE Risk Mitigation (From admission, onward)        Ordered     IP VTE HIGH RISK PATIENT  Once     Full dose Lovenox x1 dose given due to elevated D-dimer 04/03/19 0252     Place TIANA hose  Until discontinued      04/03/19 0252        Critical care time spent on the evaluation and treatment of severe organ dysfunction, review of pertinent labs and imaging studies, discussions with consulting providers and discussions with patient/family: 75 minutes.     Layne Stevens NP  Department of Hospital Medicine   [facility]

## 2019-04-03 NOTE — ED NOTES
Pt en route to [facility] via [facility] RENE Grimm in CCU notified.

## 2019-04-04 LAB
ANION GAP SERPL CALC-SCNC: 12 MMOL/L (ref 8–16)
BASOPHILS # BLD AUTO: 0 K/UL (ref 0–0.2)
BASOPHILS NFR BLD: 0.1 % (ref 0–1.9)
BUN SERPL-MCNC: 85 MG/DL (ref 8–23)
CALCIUM SERPL-MCNC: 8.6 MG/DL (ref 8.7–10.5)
CHLORIDE SERPL-SCNC: 107 MMOL/L (ref 95–110)
CK SERPL-CCNC: 23 U/L (ref 20–200)
CO2 SERPL-SCNC: 22 MMOL/L (ref 23–29)
CREAT SERPL-MCNC: 3.4 MG/DL (ref 0.5–1.4)
DIFFERENTIAL METHOD: ABNORMAL
EOSINOPHIL # BLD AUTO: 0 K/UL (ref 0–0.5)
EOSINOPHIL NFR BLD: 0 % (ref 0–8)
ERYTHROCYTE [DISTWIDTH] IN BLOOD BY AUTOMATED COUNT: 19.3 % (ref 11.5–14.5)
EST. GFR  (AFRICAN AMERICAN): 20 ML/MIN/1.73 M^2
EST. GFR  (NON AFRICAN AMERICAN): 17 ML/MIN/1.73 M^2
FOLATE SERPL-MCNC: 11.9 NG/ML (ref 4–24)
GLUCOSE SERPL-MCNC: 114 MG/DL (ref 70–110)
HCT VFR BLD AUTO: 25.2 % (ref 40–54)
HGB BLD-MCNC: 8.1 G/DL (ref 14–18)
IRON SERPL-MCNC: 29 UG/DL (ref 45–160)
LYMPHOCYTES # BLD AUTO: 0.8 K/UL (ref 1–4.8)
LYMPHOCYTES NFR BLD: 11 % (ref 18–48)
MCH RBC QN AUTO: 25.8 PG (ref 27–31)
MCHC RBC AUTO-ENTMCNC: 32.2 G/DL (ref 32–36)
MCV RBC AUTO: 80 FL (ref 82–98)
MONOCYTES # BLD AUTO: 0.6 K/UL (ref 0.3–1)
MONOCYTES NFR BLD: 7.8 % (ref 4–15)
NEUTROPHILS # BLD AUTO: 6.1 K/UL (ref 1.8–7.7)
NEUTROPHILS NFR BLD: 81.1 % (ref 38–73)
PHOSPHATE SERPL-MCNC: 6.3 MG/DL (ref 2.7–4.5)
PLATELET # BLD AUTO: 138 K/UL (ref 150–350)
PMV BLD AUTO: 9.9 FL (ref 9.2–12.9)
POTASSIUM SERPL-SCNC: 4.6 MMOL/L (ref 3.5–5.1)
RBC # BLD AUTO: 3.14 M/UL (ref 4.6–6.2)
SATURATED IRON: 9 % (ref 20–50)
SODIUM SERPL-SCNC: 141 MMOL/L (ref 136–145)
TOTAL IRON BINDING CAPACITY: 330 UG/DL (ref 250–450)
TRANSFERRIN SERPL-MCNC: 223 MG/DL (ref 200–375)
VIT B12 SERPL-MCNC: 518 PG/ML (ref 210–950)
WBC # BLD AUTO: 7.6 K/UL (ref 3.9–12.7)

## 2019-04-04 PROCEDURE — 99900035 HC TECH TIME PER 15 MIN (STAT)

## 2019-04-04 PROCEDURE — 63600175 PHARM REV CODE 636 W HCPCS: Performed by: INTERNAL MEDICINE

## 2019-04-04 PROCEDURE — 27000221 HC OXYGEN, UP TO 24 HOURS

## 2019-04-04 PROCEDURE — 80048 BASIC METABOLIC PNL TOTAL CA: CPT

## 2019-04-04 PROCEDURE — 25000003 PHARM REV CODE 250: Performed by: HOSPITALIST

## 2019-04-04 PROCEDURE — 25000003 PHARM REV CODE 250: Performed by: NURSE PRACTITIONER

## 2019-04-04 PROCEDURE — 87070 CULTURE OTHR SPECIMN AEROBIC: CPT

## 2019-04-04 PROCEDURE — 99232 PR SUBSEQUENT HOSPITAL CARE,LEVL II: ICD-10-PCS | Mod: ,,, | Performed by: INTERNAL MEDICINE

## 2019-04-04 PROCEDURE — 82607 VITAMIN B-12: CPT

## 2019-04-04 PROCEDURE — 20000000 HC ICU ROOM

## 2019-04-04 PROCEDURE — 83540 ASSAY OF IRON: CPT

## 2019-04-04 PROCEDURE — 25000242 PHARM REV CODE 250 ALT 637 W/ HCPCS: Performed by: INTERNAL MEDICINE

## 2019-04-04 PROCEDURE — 63600175 PHARM REV CODE 636 W HCPCS: Performed by: NURSE PRACTITIONER

## 2019-04-04 PROCEDURE — 87205 SMEAR GRAM STAIN: CPT

## 2019-04-04 PROCEDURE — 82550 ASSAY OF CK (CPK): CPT

## 2019-04-04 PROCEDURE — 99232 SBSQ HOSP IP/OBS MODERATE 35: CPT | Mod: ,,, | Performed by: INTERNAL MEDICINE

## 2019-04-04 PROCEDURE — 82746 ASSAY OF FOLIC ACID SERUM: CPT

## 2019-04-04 PROCEDURE — 36415 COLL VENOUS BLD VENIPUNCTURE: CPT

## 2019-04-04 PROCEDURE — 25000003 PHARM REV CODE 250: Performed by: INTERNAL MEDICINE

## 2019-04-04 PROCEDURE — 25000242 PHARM REV CODE 250 ALT 637 W/ HCPCS: Performed by: HOSPITALIST

## 2019-04-04 PROCEDURE — 94640 AIRWAY INHALATION TREATMENT: CPT

## 2019-04-04 PROCEDURE — 84100 ASSAY OF PHOSPHORUS: CPT

## 2019-04-04 PROCEDURE — 63600175 PHARM REV CODE 636 W HCPCS: Performed by: HOSPITALIST

## 2019-04-04 PROCEDURE — 85025 COMPLETE CBC W/AUTO DIFF WBC: CPT

## 2019-04-04 PROCEDURE — 94761 N-INVAS EAR/PLS OXIMETRY MLT: CPT

## 2019-04-04 PROCEDURE — 94660 CPAP INITIATION&MGMT: CPT

## 2019-04-04 RX ORDER — CARVEDILOL 3.12 MG/1
3.12 TABLET ORAL 2 TIMES DAILY
Status: DISCONTINUED | OUTPATIENT
Start: 2019-04-04 | End: 2019-04-05

## 2019-04-04 RX ORDER — ENOXAPARIN SODIUM 100 MG/ML
1 INJECTION SUBCUTANEOUS ONCE
Status: COMPLETED | OUTPATIENT
Start: 2019-04-04 | End: 2019-04-04

## 2019-04-04 RX ADMIN — ESCITALOPRAM OXALATE 10 MG: 10 TABLET, FILM COATED ORAL at 08:04

## 2019-04-04 RX ADMIN — CARVEDILOL 3.12 MG: 3.12 TABLET, FILM COATED ORAL at 08:04

## 2019-04-04 RX ADMIN — FERROUS SULFATE TAB EC 325 MG (65 MG FE EQUIVALENT) 325 MG: 325 (65 FE) TABLET DELAYED RESPONSE at 09:04

## 2019-04-04 RX ADMIN — ENOXAPARIN SODIUM 90 MG: 100 INJECTION SUBCUTANEOUS at 08:04

## 2019-04-04 RX ADMIN — LEVALBUTEROL 1.25 MG: 1.25 SOLUTION, CONCENTRATE RESPIRATORY (INHALATION) at 08:04

## 2019-04-04 RX ADMIN — BUDESONIDE 0.5 MG: 0.5 SUSPENSION RESPIRATORY (INHALATION) at 08:04

## 2019-04-04 RX ADMIN — IPRATROPIUM BROMIDE 0.5 MG: 0.5 SOLUTION RESPIRATORY (INHALATION) at 01:04

## 2019-04-04 RX ADMIN — DOXYCYCLINE 100 MG: 100 INJECTION, POWDER, LYOPHILIZED, FOR SOLUTION INTRAVENOUS at 04:04

## 2019-04-04 RX ADMIN — CARVEDILOL 3.12 MG: 3.12 TABLET, FILM COATED ORAL at 09:04

## 2019-04-04 RX ADMIN — ATORVASTATIN CALCIUM 20 MG: 20 TABLET, FILM COATED ORAL at 08:04

## 2019-04-04 RX ADMIN — IPRATROPIUM BROMIDE 0.5 MG: 0.5 SOLUTION RESPIRATORY (INHALATION) at 07:04

## 2019-04-04 RX ADMIN — FERROUS SULFATE TAB EC 325 MG (65 MG FE EQUIVALENT) 325 MG: 325 (65 FE) TABLET DELAYED RESPONSE at 08:04

## 2019-04-04 RX ADMIN — HYDRALAZINE HYDROCHLORIDE 5 MG: 20 INJECTION INTRAMUSCULAR; INTRAVENOUS at 06:04

## 2019-04-04 RX ADMIN — IPRATROPIUM BROMIDE 0.5 MG: 0.5 SOLUTION RESPIRATORY (INHALATION) at 12:04

## 2019-04-04 RX ADMIN — FOLIC ACID 1 MG: 1 TABLET ORAL at 08:04

## 2019-04-04 RX ADMIN — BUDESONIDE 0.5 MG: 0.5 SUSPENSION RESPIRATORY (INHALATION) at 07:04

## 2019-04-04 RX ADMIN — RAMELTEON 8 MG: 8 TABLET, FILM COATED ORAL at 09:04

## 2019-04-04 RX ADMIN — FUROSEMIDE 80 MG: 10 INJECTION, SOLUTION INTRAVENOUS at 05:04

## 2019-04-04 RX ADMIN — FUROSEMIDE 10 MG/HR: 10 INJECTION, SOLUTION INTRAVENOUS at 08:04

## 2019-04-04 RX ADMIN — DOCUSATE SODIUM AND SENNOSIDES 1 TABLET: 8.6; 5 TABLET, FILM COATED ORAL at 09:04

## 2019-04-04 RX ADMIN — TRAZODONE HYDROCHLORIDE 50 MG: 50 TABLET ORAL at 09:04

## 2019-04-04 RX ADMIN — HYDRALAZINE HYDROCHLORIDE 5 MG: 20 INJECTION INTRAMUSCULAR; INTRAVENOUS at 09:04

## 2019-04-04 RX ADMIN — FERROUS SULFATE TAB EC 325 MG (65 MG FE EQUIVALENT) 325 MG: 325 (65 FE) TABLET DELAYED RESPONSE at 04:04

## 2019-04-04 RX ADMIN — CLOPIDOGREL BISULFATE 75 MG: 75 TABLET, FILM COATED ORAL at 08:04

## 2019-04-04 RX ADMIN — IPRATROPIUM BROMIDE 0.5 MG: 0.5 SOLUTION RESPIRATORY (INHALATION) at 08:04

## 2019-04-04 RX ADMIN — CEFTRIAXONE 2 G: 2 INJECTION, SOLUTION INTRAVENOUS at 04:04

## 2019-04-04 RX ADMIN — LEVALBUTEROL 1.25 MG: 1.25 SOLUTION, CONCENTRATE RESPIRATORY (INHALATION) at 07:04

## 2019-04-04 RX ADMIN — PANTOPRAZOLE SODIUM 40 MG: 40 TABLET, DELAYED RELEASE ORAL at 08:04

## 2019-04-04 RX ADMIN — OXYBUTYNIN CHLORIDE 5 MG: 5 TABLET, EXTENDED RELEASE ORAL at 08:04

## 2019-04-04 NOTE — PLAN OF CARE
04/03/19 1930   Patient Assessment/Suction   Level of Consciousness (AVPU) alert   Respiratory Effort Unlabored   Expansion/Accessory Muscles/Retractions no use of accessory muscles   All Lung Fields Breath Sounds diminished   Rhythm/Pattern, Respiratory assisted mechanically   Cough Frequency infrequent   Cough Type nonproductive   PRE-TX-O2   O2 Device (Oxygen Therapy) BiPAP   SpO2 95 %   Pulse Oximetry Type Continuous   Pulse 79   Resp 14   BP (!) 199/82   Aerosol Therapy   $ Aerosol Therapy Charges Aerosol Treatment   Respiratory Treatment Status (SVN) given   Treatment Route (SVN) in-line   Patient Position (SVN) HOB elevated   Post Treatment Assessment (SVN) breath sounds unchanged   Signs of Intolerance (SVN) none   Breath Sounds Post-Respiratory Treatment   Throughout All Fields Post-Treatment All Fields   Throughout All Fields Post-Treatment no change   Post-treatment Heart Rate (beats/min) 79   Post-treatment Resp Rate (breaths/min) 17   Preset CPAP/BiPAP Settings   Mode Of Delivery BiPAP S/T   $ CPAP/BiPAP Daily Charge BiPAP/CPAP Daily   $ Initial CPAP/BiPAP Setup? No   $ Is patient using? Yes   Size of Mask Medium/Large   Sized Appropriately? Yes   Equipment Type V60   Airway Device Type medium full face mask   Humidifier not applicable   Ipap 16   EPAP (cm H2O) 8   Pressure Support (cm H2O) 8   Set Rate (Breaths/Min) 14   ITime (sec) 0.75   Rise Time (sec) 2   Patient CPAP/BiPAP Settings   Timed Inspiration (Sec) 0.75   IPAP Rise Time (sec) 2   RR Total (Breaths/Min) 14   Tidal Volume (mL) 450   VE Minute Ventilation (L/min) 8.6 L/min   Peak Inspiratory Pressure (cm H2O) 16   TiTOT (%) 30   Total Leak (L/Min) 27   Patient Trigger - ST Mode Only (%) 67   CPAP/BiPAP Alarms   High Pressure (cm H2O) 30   Low Pressure (cm H2O) 10   Low Pressure Delay (Sec) 20   Minute Ventilation (L/Min) 1.8   High RR (breaths/min) 40   Low RR (breaths/min) 8   Apnea (Sec) 20

## 2019-04-04 NOTE — PLAN OF CARE
04/04/19 0805   Patient Assessment/Suction   Level of Consciousness (AVPU) alert   Respiratory Effort Unlabored   All Lung Fields Breath Sounds diminished   Rhythm/Pattern, Respiratory assisted mechanically   Cough Type fair;loose   PRE-TX-O2   O2 Device (Oxygen Therapy) BiPAP   $ Is the patient on Low Flow Oxygen? Yes   Oxygen Concentration (%) 85   SpO2 (!) 94 %   Pulse Oximetry Type Intermittent   $ Pulse Oximetry - Multiple Charge Pulse Oximetry - Multiple   Pulse 67   Resp 12   Aerosol Therapy   $ Aerosol Therapy Charges Aerosol Treatment   Respiratory Treatment Status (SVN) given   Treatment Route (SVN) in-line   Patient Position (SVN) HOB elevated   Signs of Intolerance (SVN) none   Breath Sounds Post-Respiratory Treatment   Throughout All Fields Post-Treatment All Fields   Throughout All Fields Post-Treatment no change   Post-treatment Heart Rate (beats/min) 72   Post-treatment Resp Rate (breaths/min) 14   Ready to Wean/Extubation Screen   FIO2<=50 (chart decimal) (!) 0.85   Pt rec neb treatments with atrovent/xopenex/pulmicort and remains on V60 bipap.

## 2019-04-04 NOTE — PROGRESS NOTES
Progress Note  Hospital Medicine  Patient Name:Duc Spears  MRN:  [id number]  Patient Class: IP- Inpatient  Admit Date: 4/3/2019  Length of Stay: 1 days  Expected Discharge Date:   Attending Physician: Mimi Landa MD  Primary Care Provider:  Primary Doctor No    SUBJECTIVE:     Principal Problem: Acute on chronic respiratory failure with hypoxia  Initial history of present illness: Mr. Spears is a 75yo M with a PMH of HTN, Chronic Respiratory failure O2 Dependent, and CAD. He presented to [place] with c/o Dyspnea. It started 2 days ago and got progressively worst. On arrival to [place], he was in respiratory distress and hypoxic. He was found to be in acute CHF and given nebulizers, high flow 02, and a total of 80mg IV Lasix. He was also found to have COSTA. He was transferred to [facility] for Nephrology services. On arrival to ICU, he was in respiratory distress and hypoxic. A 1L bag of IV NS was in progress on his arrival, and about 500cc was left in the bag. The infusion was stopped. Bipap therapy was initiated and he was given another 40mg IV lasix for pulmonary congestion. eICU physician ordered nebulizers. A repeat CXR was done and was essentially unchanged from the one earlier done at [place]. After initiateion of Bipap and a nebulizer treatment, he reports that he is starting to breath better. On Bipap, his SPO2 improved to 90%, up from 70's.     PMH/PSH/SH/FH/Meds: reviewed.    Symptoms/Review of Systems:  In intensive care unit, still needing BiPAP noninvasive ventilatory therapy for hypoxia.  Very hard of hearing.  Patient has been started on intravenous Lasix infusion as per Dr. Rose.  Denies any subjective complaints such as chest pain or palpitations. No chest pain or headache, fever or abdominal pain.     Diet:  Adequate intake.    Activity level:  Up with assist   Pain:  Patient reports no pain.       OBJECTIVE:   Vital Signs (Most Recent):      Temp: 98 °F (36.7 °C) (04/04/19 0430)  Pulse: 63  (04/04/19 0545)  Resp: 16 (04/04/19 0545)  BP: (!) 166/74 (04/04/19 0530)  SpO2: 97 % (04/04/19 0545)       Vital Signs Range (Last 24H):  Temp:  [96.9 °F (36.1 °C)-98.5 °F (36.9 °C)]   Pulse:  [63-83]   Resp:  [13-49]   BP: (117-199)/(57-90)   SpO2:  [91 %-100 %]     Weight: 88.3 kg (194 lb 10.7 oz)  Body mass index is 29.6 kg/m².    Intake/Output Summary (Last 24 hours) at 4/4/2019 0731  Last data filed at 4/4/2019 0200  Gross per 24 hour   Intake 450 ml   Output 1310 ml   Net -860 ml     Physical Examination:  Constitutional: He is oriented to person, place, and time.  On BiPAP  HENT:   Head: Normocephalic.   Eyes: Pupils are equal, round, and reactive to light.   Neck: Normal range of motion. Neck supple. No JVD present. No tracheal deviation present.   Cardiovascular: Normal rate, regular rhythm, normal heart sounds and intact distal pulses.   Pulmonary/Chest: He is in respiratory distress. He has rhonchi.   Diffuse rhonchi bilaterally   Abdominal: Soft. Bowel sounds are normal. He exhibits no distension. There is no tenderness.   Musculoskeletal: Normal range of motion. He exhibits no edema.   Neurological: He is alert and oriented to person, place, and time. No cranial nerve deficit.   Skin: Skin is warm, dry and intact. Capillary refill takes less than 2 seconds.   CRANIAL NERVES   CN III, IV, VI   Pupils are equal, round, and reactive to light.    CBC:  Recent Labs   Lab 04/02/19  1328 04/03/19  0310   WBC 10.24 9.40   RBC 3.51* 3.84*   HGB 9.1* 9.8*   HCT 30.0* 31.1*    194   MCV 86 81*   MCH 25.9* 25.6*   MCHC 30.3* 31.5*   BMP  Recent Labs   Lab 04/02/19  1328 04/03/19  0310 04/04/19  0448   * 104 114*    139 141   K 4.4 4.1 4.6    105 107   CO2 22* 23 22*   BUN 84* 81* 85*   CREATININE 3.4* 3.9* 3.4*   CALCIUM 8.5* 9.4 8.6*   MG  --  2.5  --       Diagnostic Results:  Microbiology Results (last 7 days)     Procedure Component Value Units Date/Time    Blood culture [[id number]]  Collected:  04/03/19 0343    Order Status:  Completed Specimen:  Blood Updated:  04/03/19 1715     Blood Culture, Routine No Growth to date    Culture, Respiratory with Gram Stain [[id number]]     Order Status:  No result Specimen:  Respiratory          Renal US:  Features of medical renal disease.  No hydronephrosis. Cholelithiasis.    CXR:  1. Bilateral pleural effusions between small to moderate in size.  2. Bilateral airspace disease which could reflect atelectasis, pneumonia, pulmonary edema, acute lung injury/ARDS.  3. Cardiomegaly.    ECHO:  · Concentric left ventricular hypertrophy.  · Normal left ventricular systolic function. The estimated ejection fraction is 58%  · Grade III (severe) left ventricular diastolic dysfunction consistent with restrictive physiology.  · Elevated left atrial pressure.  · Low normal right ventricular systolic function.  · Moderate left atrial enlargement.  · Mild right atrial enlargement.  · Mild mitral regurgitation.  · Mild tricuspid regurgitation.  · Elevated central venous pressure (15 mm Hg).  · The estimated PA systolic pressure is 43 mm Hg  · Pulmonary hypertension present.  · There is a moderate left pleural effusion.  · Pericardial effusion.    Assessment/Plan:   * Acute on chronic respiratory failure with hypoxia  Continue supplemental oxygen to maintain pulse ox above 92%.  Use BiPAP noninvasive ventilatory therapy as needed.  Follow pulmonary recommendations.  Continue beta 2 agonist bronchodilator nebulization treatments.  Continue close monitoring in intensive care unit.     Acute on chronic diastolic congestive heart failure exacerbation  Follow Cardiology recommendations.  Echo results reviewed.  Continue intravenous IV Lasix infusion as per Dr. Rose and nephrology team.  Replete electrolytes as needed.  Hold chronic ARB due to COSTA.  Add Coreg 3.125 mg twice daily.  Monitor on telemetry.     COSTA (acute kidney injury)  ??Cardiorenal Syndrome  Hold chronic  diuretics, ARB, and Sulindac for now.  Follow Nephrology recommendation.  Renal ultrasound results reviewed.  Strict I&O  Avoid NSAIDs/Nephrotoxins  Renal dose medications     Elevated d-dimer  Unable to do Chest CTA due to COSTA  Obtain VQ scan when condition stabilizes  Full dose lovenox daily until VQ scan results    CAD (coronary artery disease)  Continue statin and plavix  Not on chronic ASA     Essential hypertension  Chronic/Controlled. Continue chronic medications, adjusting as needed.    Nonsustained ventricular tachycardia, asymptomatic  Telemonitoring.  Check Mag level.  Continue serial cardiac enzymes.  Follow Cardiology recommendation     Critical Care  - THE PATIENT HAS A HIGH PROBABILITY OF IMMINENT OR LIFE THREATENING DETERIORATION.  Over 50%time of encounter was in direct care at bedside.  Time was 35 minutes required for patient care.     VTE Risk Mitigation (From admission, onward)        Ordered     enoxaparin injection 90 mg  Once      04/04/19 0741     IP VTE HIGH RISK PATIENT  Once      04/03/19 0252     Place TIANA hose  Until discontinued      04/03/19 0252        Mimi Landa MD  Department of Hospital Medicine   [facility]

## 2019-04-04 NOTE — PLAN OF CARE
Problem: Adult Inpatient Plan of Care  Goal: Plan of Care Review  Outcome: Ongoing (interventions implemented as appropriate)  Pt on nasal cannula for part of day. Tolerated well while TIV44-56 degrees; unable to tolerate to lie flat long enough for VQ scan. Pt tolerating diet well. Lasix gtt running as ordered. Chao intact with adequate uop. Respiratory culture collected and sent to lab. VSS. Safety maintained. Currently on biPap @30%. Will monitor.

## 2019-04-04 NOTE — PLAN OF CARE
Problem: Adult Inpatient Plan of Care  Goal: Plan of Care Review  Outcome: Ongoing (interventions implemented as appropriate)  Ensured patient safety with frequent checks, assessing pain and monitoring respiratory status on continous Bipap. Patient was able to take off bipap for sips of water and meds only as sats which would range between 96-96% would drop to 88-84% quickly. Patient remains pleasant, aaox 3 and follows commands. Patient remains free of falls and skin intact. Will continue to monitor.

## 2019-04-04 NOTE — PROGRESS NOTES
Patients wife arrived on the floor with a family friend. The wife set a pass code of [phone], the patient stated his wife was to be his POA, he did not have paperwork for his son and denied his son having paperwork for the same. We discussed having a point of contact to relay information clearly to all family members but still being able to answer all questions. Patient and spouse all in agree ance to plan of wife for POA and point of contact.

## 2019-04-04 NOTE — PROGRESS NOTES
Progress Note  PULMONARY    Admit Date: 4/3/2019   04/04/2019      SUBJECTIVE:     April 4th, alert, on niv and tolerating well.        PFSH and Allergies reviewed.    OBJECTIVE:     Vitals (Most recent):  Vitals:    04/04/19 0545   BP:    Pulse: 63   Resp: 16   Temp:        Vitals (24 hour range):  Temp:  [96.9 °F (36.1 °C)-98.5 °F (36.9 °C)]   Pulse:  [63-83]   Resp:  [13-49]   BP: (117-199)/(57-90)   SpO2:  [91 %-100 %]       Intake/Output Summary (Last 24 hours) at 4/4/2019 0800  Last data filed at 4/4/2019 0200  Gross per 24 hour   Intake 450 ml   Output 1235 ml   Net -785 ml          Physical Exam:  The patient's neuro status (alertness,orientation,cognitive function,motor skills,), pharyngeal exam (oral lesions, hygiene, abn dentition,), Neck (jvd,mass,thyroid,nodes in neck and above/below clavicle),RESPIRATORY(symmetry,effort,fremitus,percussion,auscultation),  Cor(rhythm,heart tones including gallops,perfusion,edema)ABD(distention,hepatic&splenomegaly,tenderness,masses), Skin(rash,cyanosis),Psyc(affect,judgement,).  Exam negative except for these pertinent findings:    Alert,niv, no distress, diffuse rales, no sign edema, symmetric, nl fremitus and percussion, distent cor    Radiographs reviewed: view by direct vision  4/3/19 cw pulm edeam and copd  No results found for this or any previous visit.]    Labs     No results for input(s): WBC, HGB, HCT, PLT, BAND, METAMYELOCYT, MYELOPCT, HGBA1C in the last 24 hours.  Recent Labs   Lab 04/03/19  0903 04/04/19  0448   NA  --  141   K  --  4.6   CL  --  107   CO2  --  22*   BUN  --  85*   CREATININE  --  3.4*   GLU  --  114*   CALCIUM  --  8.6*   PHOS  --  6.3*   TROPONINI <0.006  --    CPK  --  23   No results for input(s): PH, PCO2, PO2, HCO3 in the last 24 hours.  Microbiology Results (last 7 days)     Procedure Component Value Units Date/Time    Blood culture [[id number]] Collected:  04/03/19 0343    Order Status:  Completed Specimen:  Blood Updated:  04/03/19  1715     Blood Culture, Routine No Growth to date    Culture, Respiratory with Gram Stain [[id number]]     Order Status:  No result Specimen:  Respiratory           Impression:  Active Hospital Problems    Diagnosis  POA    *Acute on chronic respiratory failure with hypoxia [J96.21]  Yes    CAD (coronary artery disease) [I25.10]  Yes    Elevated d-dimer [R79.89]  Yes    COPD exacerbation [J44.1]  Yes    Calcification of aorta [I70.0]  Yes    Acute pulmonary edema [J81.0]  Yes    Acute diastolic congestive heart failure [I50.31]  Yes    Essential hypertension [I10]  Yes    COSTA (acute kidney injury) [N17.9]  Yes      Resolved Hospital Problems   No resolved problems to display.               Plan:   April 4 - only 800cc negative on lasix 80q8- still in pulm edema, echo diastolic chf.  Renal would seem biggest issue.  Lasix drip or dialysis.  resp compensated but marginal..    Will order lasix drip.  Likely needs dialysis acutely.  Renal is seeing.                                    .

## 2019-04-04 NOTE — NURSING
1000: Pt taken off biPap and placed on nc@4L to monitor status for VQ scan. Sats 90-92% @[id number].     1100: HOB lowered flat to monitor pt's tolerance.

## 2019-04-04 NOTE — PROGRESS NOTES
[facility]  Cardiology  Progress Note    Patient Name: Duc Spears  MRN: [id number]  Admission Date: 4/3/2019  Hospital Length of Stay: 1 days  Code Status: Full Code   Attending Provider: Dr Landa  Consulting Provider: LYLA Martini  Primary Care Physician: Primary Doctor No  Principal Problem:Acute on chronic respiratory failure with hypoxia    Subjective:     HPI: Mr Duc Spears is a 75 y/o  gentleman who presented with SOB. It is difficult to obtain much information as he is on BiPap. He indicates that about 5 days ago he started developing SOB and it got progressively worse. He has had CHF in the past. Denies having CP, palpitations and edema. Also has a cardiologist but could not understand the name and location.  He has a hx of COPD, HTN and CHF. He is a smoker having quite several weeks ago and uses home oxygen. Lives with his wife.  Noted on admission to have renal failure and abnormal urinalysis. Also elevated D Dimer at 1580.  EKG shows NSR with occasional PAC's.    Interval history: Not doing much better today. Was on nasal oxygen but kept sating below 90% so placed back on BiPap. Has not had his VQ scan due to BiPap.  States his cardiologist is Dr Huitron in [place]. BP elevated likely secondary to respiratory fatigue on NC. Remains in NSR.    ROS:   As above otherwise negative.     Objective:     Vital Signs (Most Recent):  Temp: 98.3 °F (36.8 °C) (04/04/19 0800)  Pulse: 69 (04/04/19 1030)  Resp: 14 (04/04/19 1030)  BP: (!) 162/70 (04/04/19 1030)  SpO2: (!) 90 % (04/04/19 1030) Vital Signs (24h Range):  Temp:  [96.9 °F (36.1 °C)-98.5 °F (36.9 °C)] 98.3 °F (36.8 °C)  Pulse:  [59-83] 69  Resp:  [11-49] 14  SpO2:  [90 %-100 %] 90 %  BP: (120-208)/() 162/70     Weight: 88.3 kg (194 lb 10.7 oz)  Body mass index is 29.6 kg/m².    SpO2: (!) 90 %  O2 Device (Oxygen Therapy): BiPAP      Intake/Output Summary (Last 24 hours) at 4/4/2019 1138  Last data filed  at 4/4/2019 1000  Gross per 24 hour   Intake 450 ml   Output 1465 ml   Net -1015 ml       Physical Exam   Constitutional: He is oriented to person, place, and time. He appears well-developed and well-nourished.   Somewhat sleepy.   HENT:   Head: Normocephalic.   Eyes: Pupils are equal, round, and reactive to light. Conjunctivae are normal.   Neck: Normal range of motion. Neck supple. No JVD present. No thyromegaly present.   Cardiovascular: Normal rate, regular rhythm, normal heart sounds and intact distal pulses.   No murmur heard.  Pulmonary/Chest:   On BiPap with decreased BS bases and crackles throughout.   Abdominal: Soft. Bowel sounds are normal.   Musculoskeletal: Normal range of motion. He exhibits no edema.   Neurological: He is alert and oriented to person, place, and time.   Skin: Skin is warm and dry.   Psychiatric: He has a normal mood and affect. His behavior is normal. Thought content normal.       Significant Labs:   CMP   Recent Labs   Lab 04/02/19  1328 04/03/19  0310 04/04/19  0448    139 141   K 4.4 4.1 4.6    105 107   CO2 22* 23 22*   * 104 114*   BUN 84* 81* 85*   CREATININE 3.4* 3.9* 3.4*   CALCIUM 8.5* 9.4 8.6*   PROT 7.5  --   --    ALBUMIN 3.0*  --   --    BILITOT 0.7  --   --    ALKPHOS 88  --   --    AST 14  --   --    ALT 8*  --   --    ANIONGAP 12 11 12   ESTGFRAFRICA 19.7* 17* 20*   EGFRNONAA 17.0* 14* 17*   , CBC   Recent Labs   Lab 04/02/19  1328 04/03/19  0310 04/04/19  0714   WBC 10.24 9.40 7.60   HGB 9.1* 9.8* 8.1*   HCT 30.0* 31.1* 25.2*    194 138*    and Troponin   Recent Labs   Lab 04/03/19  0310 04/03/19  0903   TROPONINI 0.017 <0.006   BNP0 - 99 pg/mL1,027High    [id number] - 400 ng/[id number] Panic    Phosphorus2.7 - 4.5 mg/dL6.5High    Magnesium1.6 - 2.6 mg/dL2.5       Significant Imaging:   C Xray  1. Bilateral pleural effusions between small to moderate in size.  2. Bilateral airspace disease which could reflect atelectasis, pneumonia,  pulmonary edema, acute lung injury/ARDS.  3. Cardiomegaly.  VQ Scan pending  Echo   · Concentric left ventricular hypertrophy.  · Normal left ventricular systolic function. The estimated ejection fraction is 58%  · Grade III (severe) left ventricular diastolic dysfunction consistent with restrictive physiology.  · Elevated left atrial pressure.  · Low normal right ventricular systolic function.  · Moderate left atrial enlargement.  · Mild right atrial enlargement.  · Mild mitral regurgitation.  · Mild tricuspid regurgitation.  · Elevated central venous pressure (15 mm Hg).  · The estimated PA systolic pressure is 43 mm Hg  · Pulmonary hypertension present.  · There is a moderate left pleural effusion.  · Pericardial effusion.    Assessment and Plan:     Acute on Chronic respiratory failure  On BiPap    Pulmonary HTN   Pulmonary consulted.    Pleural Effusion    Pericardial effusion  Monitor, may need another limited echo during course of hospital stay to ensure is not getting worse. Diuretics.    CHF  Lasix IV, await Echo results.    COPD  On steroids and home oxygen    HTN  Continue home meds, monitor BP's    Tobacco abuse  Smoking cessation    Renal failure  Nephrology consulted    Thank you for your consult.     Yulia Vang, LYLA  Cardiology   [facility]

## 2019-04-04 NOTE — CONSULTS
Nephrology Consult Note        Patient Name: Duc Spears  MRN: [id number]    Patient Class: IP- Inpatient   Admission Date: 4/3/2019  Length of Stay: 1 days  Date of Service: 2019    Attending Physician: Mimi Landa MD  Primary Care Provider: Primary Doctor No    Reason for Consult: [email or web address]/[email or web address]    SUBJECTIVE:     HPI: 75M with COPD on 2L home oxygen, CAD (MI ), CHF, HTN, smoker for many years (quit 2 weeks) presented with SOB, wheezing for 2 weeks, with acute worsening 2 days ago. Denies any fever. Renal consulted for LEONEL.     VSS, no new complains, good UO.    Past Medical History:   Diagnosis Date    Cancer     skin cancer    Coronary artery disease     Hypertension      Past Surgical History:   Procedure Laterality Date    APPENDECTOMY      TONSILLECTOMY       History reviewed. No pertinent family history.  Social History     Tobacco Use    Smoking status: Former Smoker     Last attempt to quit: 2011     Years since quittin.0   Substance Use Topics    Alcohol use: Never     Frequency: Never    Drug use: Never       Review of patient's allergies indicates:   Allergen Reactions    Codeine Nausea And Vomiting       Outpatient meds:  No current facility-administered medications on file prior to encounter.      Current Outpatient Medications on File Prior to Encounter   Medication Sig Dispense Refill    albuterol (VENTOLIN HFA) 90 mcg/actuation inhaler Ventolin HFA 90 mcg/actuation aerosol inhaler      atorvastatin (LIPITOR) 20 MG tablet atorvastatin 20 mg tablet      clopidogrel (PLAVIX) 75 mg tablet clopidogrel 75 mg tablet      cyanocobalamin (VITAMIN B-12) 1000 MCG tablet Vitamin B-12 1,000 mcg tablet   TK 1 T PO QD      escitalopram oxalate (LEXAPRO) 10 MG tablet escitalopram 10 mg tablet      ferrous sulfate (FEOSOL) 325 mg (65 mg iron) Tab tablet ferrous sulfate 325 mg (65 mg iron) tablet   TK 1 T PO TID      folic acid (FOLVITE) 1 MG tablet folic acid 1 mg tablet   TK 1  T PO D      hydroCHLOROthiazide (HYDRODIURIL) 50 MG tablet hydrochlorothiazide 50 mg tablet      hydrOXYzine pamoate (VISTARIL) 25 MG Cap hydroxyzine pamoate 25 mg capsule      losartan-hydrochlorothiazide 100-25 mg (HYZAAR) 100-25 mg per tablet losartan 100 mg-hydrochlorothiazide 25 mg tablet      omeprazole (PRILOSEC) 20 MG capsule omeprazole 20 mg capsule,delayed release      ondansetron (ZOFRAN) 4 MG tablet ondansetron HCl 4 mg tablet      oxybutynin (DITROPAN-XL) 5 MG TR24 oxybutynin chloride ER 5 mg tablet,extended release 24 hr   TK 1 T PO D      pantoprazole (PROTONIX) 40 MG tablet pantoprazole 40 mg tablet,delayed release      polyethylene glycol (GAVILYTE-G) 236-22.74-6.74 -5.86 gram suspension GaviLyte-G 236 gram-22.74 gram-6.74 gram-5.86 gram oral solution      potassium chloride SA (K-DUR,KLOR-CON) 20 MEQ tablet potassium chloride ER 20 mEq tablet,extended release(part/cryst)      pregabalin (LYRICA) 150 MG capsule Lyrica 150 mg capsule      sucralfate (CARAFATE) 1 gram tablet sucralfate 1 gram tablet      sulindac (CLINORIL) 200 MG Tab sulindac 200 mg tablet      HYDROcodone-acetaminophen (NORCO) 7.5-325 mg per tablet hydrocodone 7.5 mg-acetaminophen 325 mg tablet      nitroGLYCERIN (NITROSTAT) 0.4 MG SL tablet nitroglycerin 0.4 mg sublingual tablet      traZODone (DESYREL) 50 MG tablet trazodone 50 mg tablet         Scheduled meds:   atorvastatin  20 mg Oral Daily    budesonide  0.5 mg Nebulization Q12H    carvedilol  3.125 mg Oral BID    cefTRIAXone (ROCEPHIN) IVPB  2 g Intravenous Q24H    clopidogrel  75 mg Oral Daily    doxycycline (VIBRAMYCIN) IVPB  100 mg Intravenous Q12H    escitalopram oxalate  10 mg Oral Daily    ferrous sulfate  325 mg Oral TID    folic acid  1 mg Oral Daily    ipratropium  0.5 mg Nebulization Q6H    levalbuterol  1.25 mg Nebulization Q12H    oxybutynin  5 mg Oral Daily    pantoprazole  40 mg Oral Daily    traZODone  50 mg Oral QHS        Infusions:   furosemide (LASIX) 2 mg/mL infusion (titrating) 10 mg/hr (04/04/19 0856)       PRN meds:  acetaminophen, albuterol-ipratropium, famotidine, hydrALAZINE, influenza, ondansetron, pneumoc 13-danie conj-dip cr(PF), ramelteon, senna-docusate 8.6-50 mg, sodium chloride 0.9%    Review of Systems:  ROS    OBJECTIVE:     Vital Signs and IO (Last 24H):  Temp:  [96.9 °F (36.1 °C)-98.5 °F (36.9 °C)]   Pulse:  [59-93]   Resp:  [11-49]   BP: (141-210)/()   SpO2:  [86 %-100 %]   I/O last 3 completed shifts:  In: 780 [P.O.:230; IV Piggyback:550]  Out: 1860 [Urine:1860]    Wt Readings from Last 5 Encounters:   04/03/19 88.3 kg (194 lb 10.7 oz)   04/02/19 86.2 kg (190 lb)         Physical Exam:  Physical Exam   Constitutional: He is oriented to person, place, and time. He appears well-developed and well-nourished.   HENT:   Head: Normocephalic and atraumatic.   Mouth/Throat: Oropharynx is clear and moist.   Eyes: Pupils are equal, round, and reactive to light. EOM are normal. No scleral icterus.   Neck: Neck supple.   Cardiovascular: Normal rate and regular rhythm.   Pulmonary/Chest: Effort normal. No stridor. No respiratory distress.   Abdominal: Soft. He exhibits no distension.   Musculoskeletal: Normal range of motion. He exhibits no edema or deformity.   Neurological: He is alert and oriented to person, place, and time. No cranial nerve deficit.   Skin: Skin is warm and dry. No rash noted. He is not diaphoretic. No erythema.   Psychiatric: He has a normal mood and affect. His behavior is normal.       Body mass index is 29.6 kg/m².    Laboratory:  Recent Labs   Lab 04/02/19  1328 04/03/19  0310 04/04/19  0448    139 141   K 4.4 4.1 4.6    105 107   CO2 22* 23 22*   BUN 84* 81* 85*   CREATININE 3.4* 3.9* 3.4*   ESTGFRAFRICA 19.7* 17* 20*   EGFRNONAA 17.0* 14* 17*   CALCIUM 8.5* 9.4 8.6*   ALBUMIN 3.0*  --   --    PHOS  --  6.5* 6.3*       Recent Labs   Lab 04/02/19  1328 04/03/19  0314  04/04/19  0714   WBC 10.24 9.40 7.60   HGB 9.1* 9.8* 8.1*   HCT 30.0* 31.1* 25.2*    194 138*   MCV 86 81* 80*   MCHC 30.3* 31.5* 32.2   MONO 8.9  0.9 9.3  0.9 7.8  0.6       Recent Labs   Lab 04/02/19  1328   ALKPHOS 88   BILITOT 0.7   PROT 7.5   ALBUMIN 3.0*   ALT 8*   AST 14       ASSESSMENT/PLAN:     Active Hospital Problems    Diagnosis  POA    *Acute on chronic respiratory failure with hypoxia [J96.21]  Yes    CAD (coronary artery disease) [I25.10]  Yes    Elevated d-dimer [R79.89]  Yes    COPD exacerbation [J44.1]  Yes    Calcification of aorta [I70.0]  Yes    Acute pulmonary edema [J81.0]  Yes    Acute diastolic congestive heart failure [I50.31]  Yes    Essential hypertension [I10]  Yes    COSTA (acute kidney injury) [N17.9]  Yes      Resolved Hospital Problems   No resolved problems to display.     COSTA due to infection, CHF, obstruction, volume depletion, meds  CKD stage 3-4?  Hematuria, proteinuria of unclear significance  CHF exacerbation  COPD exacerbation  No NSAIDs, ACEI/ARB, IV contrast or other nephrotoxins.  Keep MAP > 60, SBP > 100.  Dose meds for GFR < 30 ml/min.  Renal diet - low K, low phos.  Treat any infection.  Cards and pulm consult appreciated.    Anemia of CKD  Stable. Monitor.  No need for DIANA.  No need for IV iron.    HTN  Controlled.   Tolerate asymptomatic HTN up to -160.  Continue home meds.  Low sodium diet.    Thank you for allowing us to participate in the care of your patient!   We will follow the patient and provide recommendations as needed.    Fred Adamson MD    [facility]  [address] [place] [address]  [place] [id number]    [phone] - tel  [phone] - fax    4/4/2019 11:21 AM

## 2019-04-05 PROBLEM — T17.908A ASPIRATION INTO AIRWAY: Status: ACTIVE | Noted: 2019-04-05

## 2019-04-05 LAB
ANION GAP SERPL CALC-SCNC: 11 MMOL/L (ref 8–16)
BACTERIA UR CULT: NORMAL
BASOPHILS # BLD AUTO: 0 K/UL (ref 0–0.2)
BASOPHILS NFR BLD: 0 % (ref 0–1.9)
BUN SERPL-MCNC: 82 MG/DL (ref 8–23)
CALCIUM SERPL-MCNC: 9 MG/DL (ref 8.7–10.5)
CHLORIDE SERPL-SCNC: 107 MMOL/L (ref 95–110)
CO2 SERPL-SCNC: 27 MMOL/L (ref 23–29)
CREAT SERPL-MCNC: 3 MG/DL (ref 0.5–1.4)
DIFFERENTIAL METHOD: ABNORMAL
EOSINOPHIL # BLD AUTO: 0.1 K/UL (ref 0–0.5)
EOSINOPHIL NFR BLD: 0.6 % (ref 0–8)
ERYTHROCYTE [DISTWIDTH] IN BLOOD BY AUTOMATED COUNT: 19.9 % (ref 11.5–14.5)
EST. GFR  (AFRICAN AMERICAN): 23 ML/MIN/1.73 M^2
EST. GFR  (NON AFRICAN AMERICAN): 20 ML/MIN/1.73 M^2
GLUCOSE SERPL-MCNC: 108 MG/DL (ref 70–110)
HCT VFR BLD AUTO: 28 % (ref 40–54)
HGB BLD-MCNC: 8.8 G/DL (ref 14–18)
LYMPHOCYTES # BLD AUTO: 1.5 K/UL (ref 1–4.8)
LYMPHOCYTES NFR BLD: 16.5 % (ref 18–48)
MCH RBC QN AUTO: 25.4 PG (ref 27–31)
MCHC RBC AUTO-ENTMCNC: 31.2 G/DL (ref 32–36)
MCV RBC AUTO: 81 FL (ref 82–98)
MONOCYTES # BLD AUTO: 0.6 K/UL (ref 0.3–1)
MONOCYTES NFR BLD: 7 % (ref 4–15)
NEUTROPHILS # BLD AUTO: 6.9 K/UL (ref 1.8–7.7)
NEUTROPHILS NFR BLD: 75.9 % (ref 38–73)
PHOSPHATE SERPL-MCNC: 4.3 MG/DL (ref 2.7–4.5)
PLATELET # BLD AUTO: 164 K/UL (ref 150–350)
PMV BLD AUTO: 9.7 FL (ref 9.2–12.9)
POTASSIUM SERPL-SCNC: 3.7 MMOL/L (ref 3.5–5.1)
RBC # BLD AUTO: 3.45 M/UL (ref 4.6–6.2)
SODIUM SERPL-SCNC: 145 MMOL/L (ref 136–145)
WBC # BLD AUTO: 9.1 K/UL (ref 3.9–12.7)

## 2019-04-05 PROCEDURE — 85025 COMPLETE CBC W/AUTO DIFF WBC: CPT

## 2019-04-05 PROCEDURE — 36415 COLL VENOUS BLD VENIPUNCTURE: CPT

## 2019-04-05 PROCEDURE — 99232 SBSQ HOSP IP/OBS MODERATE 35: CPT | Mod: ,,, | Performed by: INTERNAL MEDICINE

## 2019-04-05 PROCEDURE — 94640 AIRWAY INHALATION TREATMENT: CPT

## 2019-04-05 PROCEDURE — 25000003 PHARM REV CODE 250: Performed by: NURSE PRACTITIONER

## 2019-04-05 PROCEDURE — 63600175 PHARM REV CODE 636 W HCPCS: Performed by: HOSPITALIST

## 2019-04-05 PROCEDURE — 63600175 PHARM REV CODE 636 W HCPCS: Performed by: NURSE PRACTITIONER

## 2019-04-05 PROCEDURE — 20000000 HC ICU ROOM

## 2019-04-05 PROCEDURE — 99232 PR SUBSEQUENT HOSPITAL CARE,LEVL II: ICD-10-PCS | Mod: ,,, | Performed by: INTERNAL MEDICINE

## 2019-04-05 PROCEDURE — 84100 ASSAY OF PHOSPHORUS: CPT

## 2019-04-05 PROCEDURE — 94761 N-INVAS EAR/PLS OXIMETRY MLT: CPT

## 2019-04-05 PROCEDURE — 80048 BASIC METABOLIC PNL TOTAL CA: CPT

## 2019-04-05 PROCEDURE — 25000242 PHARM REV CODE 250 ALT 637 W/ HCPCS: Performed by: HOSPITALIST

## 2019-04-05 PROCEDURE — 25000003 PHARM REV CODE 250: Performed by: INTERNAL MEDICINE

## 2019-04-05 PROCEDURE — 27000221 HC OXYGEN, UP TO 24 HOURS

## 2019-04-05 PROCEDURE — 63600175 PHARM REV CODE 636 W HCPCS: Performed by: INTERNAL MEDICINE

## 2019-04-05 PROCEDURE — 25000242 PHARM REV CODE 250 ALT 637 W/ HCPCS: Performed by: INTERNAL MEDICINE

## 2019-04-05 PROCEDURE — 25000003 PHARM REV CODE 250: Performed by: HOSPITALIST

## 2019-04-05 RX ORDER — CARVEDILOL 6.25 MG/1
6.25 TABLET ORAL 2 TIMES DAILY
Status: DISCONTINUED | OUTPATIENT
Start: 2019-04-05 | End: 2019-04-12 | Stop reason: HOSPADM

## 2019-04-05 RX ORDER — ENOXAPARIN SODIUM 100 MG/ML
1 INJECTION SUBCUTANEOUS
Status: DISCONTINUED | OUTPATIENT
Start: 2019-04-05 | End: 2019-04-07

## 2019-04-05 RX ADMIN — FUROSEMIDE 10 MG/HR: 10 INJECTION, SOLUTION INTRAVENOUS at 12:04

## 2019-04-05 RX ADMIN — DOXYCYCLINE 100 MG: 100 INJECTION, POWDER, LYOPHILIZED, FOR SOLUTION INTRAVENOUS at 05:04

## 2019-04-05 RX ADMIN — IPRATROPIUM BROMIDE 0.5 MG: 0.5 SOLUTION RESPIRATORY (INHALATION) at 02:04

## 2019-04-05 RX ADMIN — FERROUS SULFATE TAB EC 325 MG (65 MG FE EQUIVALENT) 325 MG: 325 (65 FE) TABLET DELAYED RESPONSE at 04:04

## 2019-04-05 RX ADMIN — HYDRALAZINE HYDROCHLORIDE 5 MG: 20 INJECTION INTRAMUSCULAR; INTRAVENOUS at 07:04

## 2019-04-05 RX ADMIN — FERROUS SULFATE TAB EC 325 MG (65 MG FE EQUIVALENT) 325 MG: 325 (65 FE) TABLET DELAYED RESPONSE at 09:04

## 2019-04-05 RX ADMIN — IPRATROPIUM BROMIDE 0.5 MG: 0.5 SOLUTION RESPIRATORY (INHALATION) at 07:04

## 2019-04-05 RX ADMIN — ATORVASTATIN CALCIUM 20 MG: 20 TABLET, FILM COATED ORAL at 09:04

## 2019-04-05 RX ADMIN — IPRATROPIUM BROMIDE 0.5 MG: 0.5 SOLUTION RESPIRATORY (INHALATION) at 12:04

## 2019-04-05 RX ADMIN — LEVALBUTEROL 1.25 MG: 1.25 SOLUTION, CONCENTRATE RESPIRATORY (INHALATION) at 07:04

## 2019-04-05 RX ADMIN — CEFTRIAXONE 2 G: 2 INJECTION, SOLUTION INTRAVENOUS at 04:04

## 2019-04-05 RX ADMIN — OXYBUTYNIN CHLORIDE 5 MG: 5 TABLET, EXTENDED RELEASE ORAL at 09:04

## 2019-04-05 RX ADMIN — FERROUS SULFATE TAB EC 325 MG (65 MG FE EQUIVALENT) 325 MG: 325 (65 FE) TABLET DELAYED RESPONSE at 08:04

## 2019-04-05 RX ADMIN — BUDESONIDE 0.5 MG: 0.5 SUSPENSION RESPIRATORY (INHALATION) at 07:04

## 2019-04-05 RX ADMIN — FUROSEMIDE 30 MG/HR: 10 INJECTION, SOLUTION INTRAVENOUS at 11:04

## 2019-04-05 RX ADMIN — TRAZODONE HYDROCHLORIDE 50 MG: 50 TABLET ORAL at 08:04

## 2019-04-05 RX ADMIN — FOLIC ACID 1 MG: 1 TABLET ORAL at 09:04

## 2019-04-05 RX ADMIN — CARVEDILOL 3.12 MG: 3.12 TABLET, FILM COATED ORAL at 09:04

## 2019-04-05 RX ADMIN — ESCITALOPRAM OXALATE 10 MG: 10 TABLET, FILM COATED ORAL at 09:04

## 2019-04-05 RX ADMIN — HYDRALAZINE HYDROCHLORIDE 5 MG: 20 INJECTION INTRAMUSCULAR; INTRAVENOUS at 04:04

## 2019-04-05 RX ADMIN — CLOPIDOGREL BISULFATE 75 MG: 75 TABLET, FILM COATED ORAL at 09:04

## 2019-04-05 RX ADMIN — CARVEDILOL 6.25 MG: 6.25 TABLET, FILM COATED ORAL at 08:04

## 2019-04-05 RX ADMIN — ENOXAPARIN SODIUM 90 MG: 100 INJECTION SUBCUTANEOUS at 01:04

## 2019-04-05 RX ADMIN — PANTOPRAZOLE SODIUM 40 MG: 40 TABLET, DELAYED RELEASE ORAL at 09:04

## 2019-04-05 NOTE — PROGRESS NOTES
Progress Note  PULMONARY    Admit Date: 4/3/2019   04/05/2019      SUBJECTIVE:     April 4th, alert, on niv and tolerating well.    April 5, choked on breakfast, less sob, off niv now.    PFSH and Allergies reviewed.    OBJECTIVE:     Vitals (Most recent):  Vitals:    04/05/19 0750   BP:    Pulse: 102   Resp: 16   Temp:        Vitals (24 hour range):  Temp:  [97.6 °F (36.4 °C)-98.3 °F (36.8 °C)]   Pulse:  []   Resp:  [11-36]   BP: (141-211)/()   SpO2:  [86 %-100 %]       Intake/Output Summary (Last 24 hours) at 4/5/2019 0758  Last data filed at 4/5/2019 0600  Gross per 24 hour   Intake 995.33 ml   Output 3275 ml   Net -2279.67 ml          Physical Exam:  The patient's neuro status (alertness,orientation,cognitive function,motor skills,), pharyngeal exam (oral lesions, hygiene, abn dentition,), Neck (jvd,mass,thyroid,nodes in neck and above/below clavicle),RESPIRATORY(symmetry,effort,fremitus,percussion,auscultation),  Cor(rhythm,heart tones including gallops,perfusion,edema)ABD(distention,hepatic&splenomegaly,tenderness,masses), Skin(rash,cyanosis),Psyc(affect,judgement,).  Exam negative except for these pertinent findings:    Alert,nc, no distress, diffuse rales, no sign edema, symmetric, nl fremitus and percussion, distent cor    Radiographs reviewed: view by direct vision  4/5/19 cw pulm edeam and copd- not much changes  No results found for this or any previous visit.]    Labs     Recent Labs   Lab 04/05/19  0316   WBC 9.10   HGB 8.8*   HCT 28.0*        Recent Labs   Lab 04/05/19  0316      K 3.7      CO2 27   BUN 82*   CREATININE 3.0*      CALCIUM 9.0   PHOS 4.3   No results for input(s): PH, PCO2, PO2, HCO3 in the last 24 hours.  Microbiology Results (last 7 days)     Procedure Component Value Units Date/Time    Culture, Respiratory with Gram Stain [[id number]] Collected:  04/04/19 1333    Order Status:  Completed Specimen:  Respiratory from Sputum, Expectorated Updated:   04/05/19 0142     Gram Stain (Respiratory) <10 epithelial cells per low power field.     Gram Stain (Respiratory) Rare WBC's     Gram Stain (Respiratory) Moderate Gram positive cocci     Gram Stain (Respiratory) Few yeast    Blood culture [[id number]] Collected:  04/03/19 0343    Order Status:  Completed Specimen:  Blood Updated:  04/04/19 1212     Blood Culture, Routine No Growth to date     Blood Culture, Routine No Growth to date          Impression:  Active Hospital Problems    Diagnosis  POA    *Acute on chronic respiratory failure with hypoxia [J96.21]  Yes    Aspiration into airway [T17.908A]  No    CAD (coronary artery disease) [I25.10]  Yes    Elevated d-dimer [R79.89]  Yes    COPD exacerbation [J44.1]  Yes    Calcification of aorta [I70.0]  Yes    Acute pulmonary edema [J81.0]  Yes    Acute diastolic congestive heart failure [I50.31]  Yes    Essential hypertension [I10]  Yes    COSTA (acute kidney injury) [N17.9]  Yes      Resolved Hospital Problems   No resolved problems to display.               Plan:   April 4 - only 800cc negative on lasix 80q8- still in pulm edema, echo diastolic chf.  Renal would seem biggest issue.  Lasix drip or dialysis.  resp compensated but marginal..    Will order lasix drip.  Likely needs dialysis acutely.  Renal is seeing.    April 5 , I/o -2200 on lasix drip 10/hr, aspirated some breakfast but breathing better.  Creat better on lasix drip- continue drip.  Speech eval  gpc in sputum, improved but slow.  Question if aspiration significant?  procal was 0.12 on 3rd.                                .

## 2019-04-05 NOTE — PROGRESS NOTES
Progress Note  Hospital Medicine  Patient Name:Duc Spears  MRN:  [id number]  Patient Class: IP- Inpatient  Admit Date: 4/3/2019  Length of Stay: 2 days  Expected Discharge Date:   Attending Physician: Mimi Landa MD  Primary Care Provider:  Primary Doctor No    SUBJECTIVE:     Principal Problem: Acute on chronic respiratory failure with hypoxia  Initial history of present illness: Mr. Spears is a 75yo M with a PMH of HTN, Chronic Respiratory failure O2 Dependent, and CAD. He presented to [facility] with c/o Dyspnea. It started 2 days ago and got progressively worst. On arrival to [facility], he was in respiratory distress and hypoxic. He was found to be in acute CHF and given nebulizers, high flow 02, and a total of 80mg IV Lasix. He was also found to have COSTA. He was transferred to [facility] for Nephrology services. On arrival to ICU, he was in respiratory distress and hypoxic. A 1L bag of IV NS was in progress on his arrival, and about 500cc was left in the bag. The infusion was stopped. Bipap therapy was initiated and he was given another 40mg IV lasix for pulmonary congestion. eICU physician ordered nebulizers. A repeat CXR was done and was essentially unchanged from the one earlier done at [facility]. After initiateion of Bipap and a nebulizer treatment, he reports that he is starting to breath better. On Bipap, his SPO2 improved to 90%, up from 70's.     PMH/PSH/SH/FH/Meds: reviewed.    Symptoms/Review of Systems:  In intensive care unit, off BiPAP, patient had choking episode during breakfast this morning. On intravenous Lasix infusion. Deconditioned. Denies any subjective complaints such as chest pain or palpitations. No chest pain or headache, fever or abdominal pain.     Diet:  Adequate intake.    Activity level:  Up with assist   Pain:  Patient reports no pain.       OBJECTIVE:   Vital Signs (Most Recent):      Temp: 97.9 °F (36.6 °C) (04/05/19 0800)  Pulse: 80 (04/05/19 0830)  Resp: 14 (04/05/19 0830)  BP:  (!) 191/79 (04/05/19 0830)  SpO2: (!) 94 % (04/05/19 0830)       Vital Signs Range (Last 24H):  Temp:  [97.6 °F (36.4 °C)-98.3 °F (36.8 °C)]   Pulse:  []   Resp:  [12-36]   BP: (141-211)/()   SpO2:  [86 %-100 %]     Weight: 86.6 kg (190 lb 14.7 oz)  Body mass index is 29.03 kg/m².    Intake/Output Summary (Last 24 hours) at 4/5/2019 0900  Last data filed at 4/5/2019 0758  Gross per 24 hour   Intake 995.33 ml   Output 3610 ml   Net -2614.67 ml     Physical Examination:  Constitutional: He is oriented to person, place, and time.   HENT:   Head: Normocephalic.   Eyes: Pupils are equal, round, and reactive to light.   Neck: Normal range of motion. Neck supple. No JVD present. No tracheal deviation present.   Cardiovascular: Normal rate, regular rhythm, normal heart sounds and intact distal pulses.   Pulmonary/Chest:Diffuse rhonchi bilaterally   Abdominal: Soft. Bowel sounds are normal. He exhibits no distension. There is no tenderness.   Musculoskeletal: Normal range of motion. He exhibits no edema.   Neurological: He is alert and oriented to person, place, and time. No cranial nerve deficit.   Skin: Skin is warm, dry and intact. Capillary refill takes less than 2 seconds.   CRANIAL NERVES   CN III, IV, VI   Pupils are equal, round, and reactive to light.    CBC:  Recent Labs   Lab 04/03/19 0310 04/04/19  0714 04/05/19 0316   WBC 9.40 7.60 9.10   RBC 3.84* 3.14* 3.45*   HGB 9.8* 8.1* 8.8*   HCT 31.1* 25.2* 28.0*    138* 164   MCV 81* 80* 81*   MCH 25.6* 25.8* 25.4*   MCHC 31.5* 32.2 31.2*   BMP  Recent Labs   Lab 04/03/19  0310 04/04/19  0448 04/05/19 0316    114* 108    141 145   K 4.1 4.6 3.7    107 107   CO2 23 22* 27   BUN 81* 85* 82*   CREATININE 3.9* 3.4* 3.0*   CALCIUM 9.4 8.6* 9.0   MG 2.5  --   --       Diagnostic Results:  Microbiology Results (last 7 days)     Procedure Component Value Units Date/Time    Culture, Respiratory with Gram Stain [[id number]] Collected:   04/04/19 1333    Order Status:  Completed Specimen:  Respiratory from Sputum, Expectorated Updated:  04/05/19 0142     Gram Stain (Respiratory) <10 epithelial cells per low power field.     Gram Stain (Respiratory) Rare WBC's     Gram Stain (Respiratory) Moderate Gram positive cocci     Gram Stain (Respiratory) Few yeast    Blood culture [[id number]] Collected:  04/03/19 0343    Order Status:  Completed Specimen:  Blood Updated:  04/04/19 1212     Blood Culture, Routine No Growth to date     Blood Culture, Routine No Growth to date         Renal US:  Features of medical renal disease.  No hydronephrosis. Cholelithiasis.    CXR:  1. Bilateral pleural effusions between small to moderate in size.  2. Bilateral airspace disease which could reflect atelectasis, pneumonia, pulmonary edema, acute lung injury/ARDS.  3. Cardiomegaly.    ECHO:  · Concentric left ventricular hypertrophy.  · Normal left ventricular systolic function. The estimated ejection fraction is 58%  · Grade III (severe) left ventricular diastolic dysfunction consistent with restrictive physiology.  · Elevated left atrial pressure.  · Low normal right ventricular systolic function.  · Moderate left atrial enlargement.  · Mild right atrial enlargement.  · Mild mitral regurgitation.  · Mild tricuspid regurgitation.  · Elevated central venous pressure (15 mm Hg).  · The estimated PA systolic pressure is 43 mm Hg  · Pulmonary hypertension present.  · There is a moderate left pleural effusion.  · Pericardial effusion.    CXR: Bilateral airspace disease and pleural effusions, similar in extent.      Assessment/Plan:   * Acute on chronic respiratory failure with hypoxia  Continue supplemental oxygen to maintain pulse ox above 92%.  Use BiPAP noninvasive ventilatory therapy as needed.  Swallowing evaluation for possible aspiration.  On IV Rocephin and doxycycline.  Follow pulmonary recommendations.  Continue beta 2 agonist bronchodilator nebulization  treatments.  Continue close monitoring in intensive care unit.     Acute on chronic diastolic congestive heart failure exacerbation  Follow Cardiology recommendations.  Echo results reviewed.  Continue intravenous IV Lasix infusion. Replete electrolytes as needed.  Hold chronic ARB due to COSTA.  Increase Coreg 6.125 mg twice daily.  Monitor on telemetry.     COSTA (acute kidney injury) - improving  Hold ARB, and Sulindac for now.  Follow Nephrology recommendation.  Renal ultrasound results reviewed.  Strict I&O  Avoid NSAIDs/Nephrotoxins  Renal dose medications     Elevated d-dimer  Unable to do Chest CTA due to COSTA  Obtain VQ scan when condition stabilizes - pending as patient is unable to lay flat.  Full dose lovenox today until VQ scan results    CAD (coronary artery disease)  Continue statin and plavix  Not on chronic ASA     Essential hypertension  Chronic/Controlled. Continue chronic medications, adjusting as needed.    Nonsustained ventricular tachycardia, asymptomatic  Telemonitoring.  Check Mag level.  Continue serial cardiac enzymes.  Follow Cardiology recommendation    Physical deconditioning  PT/OT.     Critical Care  - THE PATIENT HAS A HIGH PROBABILITY OF IMMINENT OR LIFE THREATENING DETERIORATION.  Over 50%time of encounter was in direct care at bedside.  Time was 35 minutes required for patient care.     VTE Risk Mitigation (From admission, onward)        Ordered     enoxaparin injection 90 mg  Every 24 hours (non-standard times)      04/05/19 1043     IP VTE HIGH RISK PATIENT  Once      04/03/19 0252     Place TIANA hose  Until discontinued      04/03/19 0252        Mimi Landa MD  Department of Hospital Medicine   [facility]

## 2019-04-05 NOTE — CONSULTS
Nephrology Consult Note        Patient Name: Duc Spears  MRN: [id number]    Patient Class: IP- Inpatient   Admission Date: 4/3/2019  Length of Stay: 2 days  Date of Service: 2019    Attending Physician: Mimi Landa MD  Primary Care Provider: Primary Doctor No    Reason for Consult: leonel/ckd3    SUBJECTIVE:     HPI: 75M with COPD on 2L home oxygen, CAD (MI ), CHF, HTN, smoker for many years (quit 2 weeks) presented with SOB, wheezing for 2 weeks, with acute worsening 2 days ago. Denies any fever. Renal consulted for LEONEL.     VSS, no new complains, good UO.   VSS, no new complains.    Past Medical History:   Diagnosis Date    Cancer     skin cancer    Coronary artery disease     Hypertension      Past Surgical History:   Procedure Laterality Date    APPENDECTOMY      TONSILLECTOMY       History reviewed. No pertinent family history.  Social History     Tobacco Use    Smoking status: Former Smoker     Last attempt to quit: 2011     Years since quittin.0   Substance Use Topics    Alcohol use: Never     Frequency: Never    Drug use: Never       Review of patient's allergies indicates:   Allergen Reactions    Codeine Nausea And Vomiting       Outpatient meds:  No current facility-administered medications on file prior to encounter.      Current Outpatient Medications on File Prior to Encounter   Medication Sig Dispense Refill    albuterol (VENTOLIN HFA) 90 mcg/actuation inhaler Ventolin HFA 90 mcg/actuation aerosol inhaler      atorvastatin (LIPITOR) 20 MG tablet atorvastatin 20 mg tablet      clopidogrel (PLAVIX) 75 mg tablet clopidogrel 75 mg tablet      cyanocobalamin (VITAMIN B-12) 1000 MCG tablet Vitamin B-12 1,000 mcg tablet   TK 1 T PO QD      escitalopram oxalate (LEXAPRO) 10 MG tablet escitalopram 10 mg tablet      ferrous sulfate (FEOSOL) 325 mg (65 mg iron) Tab tablet ferrous sulfate 325 mg (65 mg iron) tablet   TK 1 T PO TID      folic acid (FOLVITE) 1 MG tablet  folic acid 1 mg tablet   TK 1 T PO D      hydroCHLOROthiazide (HYDRODIURIL) 50 MG tablet hydrochlorothiazide 50 mg tablet      hydrOXYzine pamoate (VISTARIL) 25 MG Cap hydroxyzine pamoate 25 mg capsule      losartan-hydrochlorothiazide 100-25 mg (HYZAAR) 100-25 mg per tablet losartan 100 mg-hydrochlorothiazide 25 mg tablet      omeprazole (PRILOSEC) 20 MG capsule omeprazole 20 mg capsule,delayed release      ondansetron (ZOFRAN) 4 MG tablet ondansetron HCl 4 mg tablet      oxybutynin (DITROPAN-XL) 5 MG TR24 oxybutynin chloride ER 5 mg tablet,extended release 24 hr   TK 1 T PO D      pantoprazole (PROTONIX) 40 MG tablet pantoprazole 40 mg tablet,delayed release      polyethylene glycol (GAVILYTE-G) 236-22.74-6.74 -5.86 gram suspension GaviLyte-G 236 gram-22.74 gram-6.74 gram-5.86 gram oral solution      potassium chloride SA (K-DUR,KLOR-CON) 20 MEQ tablet potassium chloride ER 20 mEq tablet,extended release(part/cryst)      pregabalin (LYRICA) 150 MG capsule Lyrica 150 mg capsule      sucralfate (CARAFATE) 1 gram tablet sucralfate 1 gram tablet      sulindac (CLINORIL) 200 MG Tab sulindac 200 mg tablet      HYDROcodone-acetaminophen (NORCO) 7.5-325 mg per tablet hydrocodone 7.5 mg-acetaminophen 325 mg tablet      nitroGLYCERIN (NITROSTAT) 0.4 MG SL tablet nitroglycerin 0.4 mg sublingual tablet      traZODone (DESYREL) 50 MG tablet trazodone 50 mg tablet         Scheduled meds:   atorvastatin  20 mg Oral Daily    budesonide  0.5 mg Nebulization Q12H    carvedilol  6.25 mg Oral BID    cefTRIAXone (ROCEPHIN) IVPB  2 g Intravenous Q24H    clopidogrel  75 mg Oral Daily    doxycycline (VIBRAMYCIN) IVPB  100 mg Intravenous Q12H    enoxaparin  1 mg/kg Subcutaneous Q24H    escitalopram oxalate  10 mg Oral Daily    ferrous sulfate  325 mg Oral TID    folic acid  1 mg Oral Daily    ipratropium  0.5 mg Nebulization Q6H    levalbuterol  1.25 mg Nebulization Q12H    oxybutynin  5 mg Oral Daily     pantoprazole  40 mg Oral Daily    traZODone  50 mg Oral QHS       Infusions:   furosemide (LASIX) 2 mg/mL infusion (titrating) 10 mg/hr (04/05/19 1258)       PRN meds:  acetaminophen, albuterol-ipratropium, famotidine, hydrALAZINE, influenza, ondansetron, pneumoc 13-danie conj-dip cr(PF), ramelteon, senna-docusate 8.6-50 mg, sodium chloride 0.9%    Review of Systems:  ROS    OBJECTIVE:     Vital Signs and IO (Last 24H):  Temp:  [97.6 °F (36.4 °C)-98.3 °F (36.8 °C)]   Pulse:  []   Resp:  [12-34]   BP: (141-211)/()   SpO2:  [89 %-99 %]   I/O last 3 completed shifts:  In: 1245.3 [P.O.:330; I.V.:115.3; IV Piggyback:800]  Out: 3895 [Urine:3895]    Wt Readings from Last 5 Encounters:   04/05/19 86.6 kg (190 lb 14.7 oz)   04/02/19 86.2 kg (190 lb)         Physical Exam:  Physical Exam   Constitutional: He is oriented to person, place, and time. He appears well-developed and well-nourished.   HENT:   Head: Normocephalic and atraumatic.   Mouth/Throat: Oropharynx is clear and moist.   Eyes: Pupils are equal, round, and reactive to light. EOM are normal. No scleral icterus.   Neck: Neck supple.   Cardiovascular: Normal rate and regular rhythm.   Pulmonary/Chest: Effort normal. No stridor. No respiratory distress.   Abdominal: Soft. He exhibits no distension.   Musculoskeletal: Normal range of motion. He exhibits no edema or deformity.   Neurological: He is alert and oriented to person, place, and time. No cranial nerve deficit.   Skin: Skin is warm and dry. No rash noted. He is not diaphoretic. No erythema.   Psychiatric: He has a normal mood and affect. His behavior is normal.       Body mass index is 29.03 kg/m².    Laboratory:  Recent Labs   Lab 04/02/19  1328 04/03/19  0310 04/04/19  0448 04/05/19  0316    139 141 145   K 4.4 4.1 4.6 3.7    105 107 107   CO2 22* 23 22* 27   BUN 84* 81* 85* 82*   CREATININE 3.4* 3.9* 3.4* 3.0*   ESTGFRAFRICA 19.7* 17* 20* 23*   EGFRNONAA 17.0* 14* 17* 20*   CALCIUM  8.5* 9.4 8.6* 9.0   ALBUMIN 3.0*  --   --   --    PHOS  --  6.5* 6.3* 4.3       Recent Labs   Lab 04/03/19  0310 04/04/19  0714 04/05/19  0316   WBC 9.40 7.60 9.10   HGB 9.8* 8.1* 8.8*   HCT 31.1* 25.2* 28.0*    138* 164   MCV 81* 80* 81*   MCHC 31.5* 32.2 31.2*   MONO 9.3  0.9 7.8  0.6 7.0  0.6       Recent Labs   Lab 04/02/19  1328   ALKPHOS 88   BILITOT 0.7   PROT 7.5   ALBUMIN 3.0*   ALT 8*   AST 14       ASSESSMENT/PLAN:     Active Hospital Problems    Diagnosis  POA    *Acute on chronic respiratory failure with hypoxia [J96.21]  Yes    Aspiration into airway [T17.908A]  No    CAD (coronary artery disease) [I25.10]  Yes    Elevated d-dimer [R79.89]  Yes    COPD exacerbation [J44.1]  Yes    Calcification of aorta [I70.0]  Yes    Acute pulmonary edema [J81.0]  Yes    Acute diastolic congestive heart failure [I50.31]  Yes    Essential hypertension [I10]  Yes    COSTA (acute kidney injury) [N17.9]  Yes      Resolved Hospital Problems   No resolved problems to display.     COSTA due to infection, CHF, obstruction, volume depletion, meds  CKD stage 3-4?  Hematuria, proteinuria of unclear significance  CHF exacerbation  COPD exacerbation  No NSAIDs, ACEI/ARB, IV contrast or other nephrotoxins.  Keep MAP > 60, SBP > 100.  Dose meds for GFR < 30 ml/min.  Renal diet - low K, low phos.  Treat any infection.  Cards and pulm consult appreciated.    Anemia of CKD  Stable. Monitor.  No need for DIANA.  No need for IV iron.    HTN  Controlled.   Tolerate asymptomatic HTN up to -160.  Continue home meds.  Low sodium diet.    Thank you for allowing us to participate in the care of your patient!   We will follow the patient and provide recommendations as needed.    Fred Adamson MD    [facility]  [address] [place] [address]  [place], [place] [address] [phone] - tel  [phone] - fax    4/5/2019 11:21 AM

## 2019-04-05 NOTE — PLAN OF CARE
Problem: Adult Inpatient Plan of Care  Goal: Plan of Care Review  Outcome: Ongoing (interventions implemented as appropriate)  Patient remains on aerosol tx via 1.25mg xopenex and 0.5mg atrovent now and q6hr and 0.5mg pulmicort now and q12hr.  Hr 111 and 02 saturation 89% on nc at 5lpm.

## 2019-04-05 NOTE — PLAN OF CARE
04/04/19 1948   Patient Assessment/Suction   Level of Consciousness (AVPU) alert   Respiratory Effort Unlabored   All Lung Fields Breath Sounds diminished   PRE-TX-O2   O2 Device (Oxygen Therapy) BiPAP   $ Is the patient on Low Flow Oxygen? Yes   Oxygen Concentration (%) 30   SpO2 96 %   Pulse Oximetry Type Continuous   $ Pulse Oximetry - Multiple Charge Pulse Oximetry - Multiple   Pulse 75   Resp 13   Aerosol Therapy   $ Aerosol Therapy Charges Aerosol Treatment   Respiratory Treatment Status (SVN) given   Treatment Route (SVN) in-line   Patient Position (SVN) HOB elevated   Post Treatment Assessment (SVN) breath sounds unchanged   Signs of Intolerance (SVN) none   Breath Sounds Post-Respiratory Treatment   Throughout All Fields Post-Treatment All Fields   Throughout All Fields Post-Treatment no change   Post-treatment Heart Rate (beats/min) 75   Post-treatment Resp Rate (breaths/min) 14   Ready to Wean/Extubation Screen   FIO2<=50 (chart decimal) 0.3   Preset CPAP/BiPAP Settings   Mode Of Delivery BiPAP S/T   $ Initial CPAP/BiPAP Setup? No   $ Is patient using? Yes   Sized Appropriately? Yes   Equipment Type V60   Airway Device Type large nasal mask   Ipap 16   EPAP (cm H2O) 8   Pressure Support (cm H2O) 8   Set Rate (Breaths/Min) 8   ITime (sec) 0.75   Rise Time (sec) 0.2   Patient CPAP/BiPAP Settings   Timed Inspiration (Sec) 0.75   IPAP Rise Time (sec) 0.2   RR Total (Breaths/Min) 13   Tidal Volume (mL) 883   VE Minute Ventilation (L/min) 10.2 L/min   Peak Inspiratory Pressure (cm H2O) 16   TiTOT (%) 25   Total Leak (L/Min) 37   Patient Trigger - ST Mode Only (%) 100   CPAP/BiPAP Alarms   High Pressure (cm H2O) 21   Low Pressure (cm H2O) 11   Minute Ventilation (L/Min) 3   High RR (breaths/min) 40   Low RR (breaths/min) 5

## 2019-04-05 NOTE — PT/OT/SLP EVAL
Speech Language Pathology Evaluation  Bedside Swallow    Patient Name:  Duc Spears   MRN:  [id number]  Admitting Diagnosis: Acute on chronic respiratory failure with hypoxia    Recommendations:                 General Recommendations:  Modified barium swallow study  Diet recommendations:  NPO, NPO(x meds crushed in applesauce)   Aspiration Precautions: Frequent oral care, Ice chips sparingly and Meds crushed in puree   General Precautions: Standard, aspiration, respiratory  Communication strategies:  none    History:     Past Medical History:   Diagnosis Date    Cancer     skin cancer    Coronary artery disease     Hypertension        Past Surgical History:   Procedure Laterality Date    APPENDECTOMY      TONSILLECTOMY         Social History: Patient lives with wife.    Prior Intubation HX: NA    Modified Barium Swallow: none in Epic    Imaging:   X-Ray Chest 1 View   Final Result      Bilateral airspace disease and pleural effusions, similar in extent.         Electronically signed by: Santiago Greenberg   Date:    04/05/2019   Time:    08:36      X-Ray Chest 1 View   Final Result      Increasing pulmonary airspace disease and bilateral pleural effusions which may reflect congestive failure or pneumonia.         Electronically signed by: Isaías Reynaga MD   Date:    04/04/2019   Time:    09:31      US Retroperitoneal Complete (Kidney and   Final Result      Features of medical renal disease.  No hydronephrosis.      Cholelithiasis.         Electronically signed by: Isaías Reynaga MD   Date:    04/03/2019   Time:    15:20      X-Ray Chest AP Portable   Final Result      1. Bilateral pleural effusions between small to moderate in size.   2. Bilateral airspace disease which could reflect atelectasis, pneumonia, pulmonary edema, acute lung injury/ARDS.   3. Cardiomegaly.         Electronically signed by: Santiago Greenberg   Date:    04/03/2019   Time:    08:34      RADIOLOGY REPORT   Final Result      NM  "Lung Scan Ventilation Perfusion    (Results Pending)   US Retroperitoneal Complete (Kidney and    (Results Pending)        Prior diet: Regular thin.    Occupation/hobbies/homemaking: Pt reports PLOF independent and ambulatory.    Subjective     "I inhaled some of my eggs."    Objective:   Pt seen in ICU for clinical swallow evaluation. Per Nsg and RT pt aspirated eggs this morning. He denies any prior h/o dysphagia. +home O2 but states he only uses it at night.  Nasal cannula with O2 sats 92-95% with report of SOB.     Oral Musculature Evaluation  · Oral Musculature: WFL  · Dentition: upper dentures(no lower dentition)  · Secretion Management: adequate  · Mucosal Quality: dry  · Mandibular Strength and Mobility: WFL  · Oral Labial Strength and Mobility: WFL  · Lingual Strength and Mobility: WFL  · Buccal Strength and Mobility: WFL  · Volitional Cough: good  · Volitional Swallow: able to palpate laryngeal rise  · Voice Prior to PO Intake: clear    Bedside Swallow Eval:   Consistencies Assessed:  · Thin liquids ice chips and water via tsp, cup and straw  · Nectar thick liquids va tsp and cup  · Puree applesauce  · Mixed consistencies diced peaches     Oral Phase:   · WFL    Pharyngeal Phase:   · coughing/choking    Compensatory Strategies  · PO presented at a slow pace    Treatment: Pt/family educated re: results/recs of evaluation, MBSS, SLP role and POC. Receptive to information provided.     Assessment:     Duc Spears is a 74 y.o. male with an SLP diagnosis of Dysphagia.  He presents with s/s pharyngeal dysphagia with thin liquids via straw and peach trials. He demonstrated incoordination of breathing and swallowing. Risk for aspiration 2' current respiratory status. REC NPO x meds crushed in applesauce and MBSS for further evaluation.    Goals:   Multidisciplinary Problems     SLP Goals        Problem: SLP Goal    Goal Priority Disciplines Outcome   SLP Goal     SLP    Description:    1) MBSS          "             Plan:     · Patient to be seen:      · Plan of Care expires:     · Plan of Care reviewed with:  patient   · SLP Follow-Up:  Yes       Discharge recommendations:      Barriers to Discharge:  None    Time Tracking:     SLP Treatment Date:   04/05/19  Speech Start Time:  1115  Speech Stop Time:  1136     Speech Total Time (min):  21 min    Billable Minutes: Eval Swallow and Oral Function 21 and Total Time 21    Roseanna Dixon CCC-SLP  04/05/2019

## 2019-04-05 NOTE — PROGRESS NOTES
Progress Note  Cardiology    Admit Date: 4/3/2019   LOS: 2 days     Follow-up For:  CHF; ARF/CRF    Scheduled Meds:   atorvastatin  20 mg Oral Daily    budesonide  0.5 mg Nebulization Q12H    carvedilol  6.25 mg Oral BID    cefTRIAXone (ROCEPHIN) IVPB  2 g Intravenous Q24H    clopidogrel  75 mg Oral Daily    doxycycline (VIBRAMYCIN) IVPB  100 mg Intravenous Q12H    enoxaparin  1 mg/kg Subcutaneous Q24H    escitalopram oxalate  10 mg Oral Daily    ferrous sulfate  325 mg Oral TID    folic acid  1 mg Oral Daily    ipratropium  0.5 mg Nebulization Q6H    levalbuterol  1.25 mg Nebulization Q12H    oxybutynin  5 mg Oral Daily    pantoprazole  40 mg Oral Daily    traZODone  50 mg Oral QHS     Continuous Infusions:   furosemide (LASIX) 2 mg/mL infusion (titrating) 10 mg/hr (04/05/19 1258)     PRN Meds:acetaminophen, albuterol-ipratropium, famotidine, hydrALAZINE, influenza, ondansetron, pneumoc 13-danie conj-dip cr(PF), ramelteon, senna-docusate 8.6-50 mg, sodium chloride 0.9%    Review of patient's allergies indicates:   Allergen Reactions    Codeine Nausea And Vomiting       SUBJECTIVE:     Interval History: Patient has complaints less sob.    Review of Systems  Respiratory: positive for cough and sputum, negative for hemoptysis and wheezing  Cardiovascular: positive for orthopnea, negative for chest pressure/discomfort, palpitations and paroxysmal nocturnal dyspnea    OBJECTIVE:     Vital Signs (Most Recent)  Temp: 97.7 °F (36.5 °C) (04/05/19 1630)  Pulse: 84 (04/05/19 1721)  Resp: (!) 25 (04/05/19 1721)  BP: 133/75 (04/05/19 1714)  SpO2: 96 % (04/05/19 1721)    Vital Signs Range (Last 24H):  Temp:  [97.6 °F (36.4 °C)-98.3 °F (36.8 °C)]   Pulse:  []   Resp:  [12-45]   BP: (119-225)/()   SpO2:  [89 %-99 %]       Physical Exam:  Neck: JVD - 1 cm above sternal notch, no carotid bruit and supple, symmetrical, trachea midline  Lungs: clear to auscultation bilaterally, normal respiratory  effort  Heart: regular rate and rhythm, S1, S2 normal, no murmur, click, rub or gallop  Abdomen: soft, non-tender; bowel sounds normal; no masses,  no organomegaly  Extremities: Extremities normal, atraumatic, no cyanosis, clubbing, or edema    Recent Results (from the past 24 hour(s))   CBC auto differential    Collection Time: 04/05/19  3:16 AM   Result Value Ref Range    WBC 9.10 3.90 - 12.70 K/uL    RBC 3.45 (L) 4.60 - 6.20 M/uL    Hemoglobin 8.8 (L) 14.0 - 18.0 g/dL    Hematocrit 28.0 (L) 40.0 - 54.0 %    MCV 81 (L) 82 - 98 fL    MCH 25.4 (L) 27.0 - 31.0 pg    MCHC 31.2 (L) 32.0 - 36.0 g/dL    RDW 19.9 (H) 11.5 - 14.5 %    Platelets 164 150 - 350 K/uL    MPV 9.7 9.2 - 12.9 fL    Gran # (ANC) 6.9 1.8 - 7.7 K/uL    Lymph # 1.5 1.0 - 4.8 K/uL    Mono # 0.6 0.3 - 1.0 K/uL    Eos # 0.1 0.0 - 0.5 K/uL    Baso # 0.00 0.00 - 0.20 K/uL    Gran% 75.9 (H) 38.0 - 73.0 %    Lymph% 16.5 (L) 18.0 - 48.0 %    Mono% 7.0 4.0 - 15.0 %    Eosinophil% 0.6 0.0 - 8.0 %    Basophil% 0.0 0.0 - 1.9 %    Differential Method Automated    Phosphorus - if not done in ED    Collection Time: 04/05/19  3:16 AM   Result Value Ref Range    Phosphorus 4.3 2.7 - 4.5 mg/dL   Basic metabolic panel     Collection Time: 04/05/19  3:16 AM   Result Value Ref Range    Sodium 145 136 - 145 mmol/L    Potassium 3.7 3.5 - 5.1 mmol/L    Chloride 107 95 - 110 mmol/L    CO2 27 23 - 29 mmol/L    Glucose 108 70 - 110 mg/dL    BUN, Bld 82 (H) 8 - 23 mg/dL    Creatinine 3.0 (H) 0.5 - 1.4 mg/dL    Calcium 9.0 8.7 - 10.5 mg/dL    Anion Gap 11 8 - 16 mmol/L    eGFR if African American 23 (A) >60 mL/min/1.73 m^2    eGFR if non African American 20 (A) >60 mL/min/1.73 m^2       Diagnostic Results:  Labs: Reviewed  X-Ray: Reviewed    ASSESSMENT/PLAN:     Lasix @ 10mg /h - continue. BP elevated - was checked in leg. Arm BP wnl.  No significant change in renal ftn . LVEF is normal ; no WMA. Dilated IVC c/w volume overload.

## 2019-04-05 NOTE — PLAN OF CARE
Problem: SLP Goal  Goal: SLP Goal    1) MBSS      Pt seen for clinical swallowing evaluation. Pt presents with s/s pharyngeal dysphagia. REC MBSS for further evaluation. In the interim, crush meds in applesauce.     Roseanna Dixon MS, CCC/SLP 4/5/2019

## 2019-04-05 NOTE — PLAN OF CARE
Problem: Adult Inpatient Plan of Care  Goal: Plan of Care Review  Outcome: Ongoing (interventions implemented as appropriate)  Remained off BIPAP all night with 4L nasal cannula. Desats quickly when laying flat and/or activity. Lasix drip titrated up to 20mg/hr to keep UO>150cc/hr. No complaints of pain and safety maintained.

## 2019-04-06 ENCOUNTER — OUTSIDE PLACE OF SERVICE (OUTPATIENT)
Dept: PULMONOLOGY | Facility: CLINIC | Age: 74
End: 2019-04-06
Payer: MEDICARE

## 2019-04-06 LAB
ANION GAP SERPL CALC-SCNC: 12 MMOL/L (ref 8–16)
BASOPHILS # BLD AUTO: 0.1 K/UL (ref 0–0.2)
BASOPHILS NFR BLD: 0.6 % (ref 0–1.9)
BUN SERPL-MCNC: 74 MG/DL (ref 8–23)
CALCIUM SERPL-MCNC: 9.5 MG/DL (ref 8.7–10.5)
CHLORIDE SERPL-SCNC: 103 MMOL/L (ref 95–110)
CO2 SERPL-SCNC: 29 MMOL/L (ref 23–29)
CREAT SERPL-MCNC: 2.5 MG/DL (ref 0.5–1.4)
DIFFERENTIAL METHOD: ABNORMAL
EOSINOPHIL # BLD AUTO: 0.1 K/UL (ref 0–0.5)
EOSINOPHIL NFR BLD: 1 % (ref 0–8)
ERYTHROCYTE [DISTWIDTH] IN BLOOD BY AUTOMATED COUNT: 18.9 % (ref 11.5–14.5)
EST. GFR  (AFRICAN AMERICAN): 29 ML/MIN/1.73 M^2
EST. GFR  (NON AFRICAN AMERICAN): 25 ML/MIN/1.73 M^2
GLUCOSE SERPL-MCNC: 126 MG/DL (ref 70–110)
HCT VFR BLD AUTO: 33.3 % (ref 40–54)
HGB BLD-MCNC: 10.3 G/DL (ref 14–18)
LYMPHOCYTES # BLD AUTO: 1 K/UL (ref 1–4.8)
LYMPHOCYTES NFR BLD: 11.4 % (ref 18–48)
MCH RBC QN AUTO: 25.2 PG (ref 27–31)
MCHC RBC AUTO-ENTMCNC: 31 G/DL (ref 32–36)
MCV RBC AUTO: 81 FL (ref 82–98)
MONOCYTES # BLD AUTO: 0.7 K/UL (ref 0.3–1)
MONOCYTES NFR BLD: 7.7 % (ref 4–15)
NEUTROPHILS # BLD AUTO: 7.3 K/UL (ref 1.8–7.7)
NEUTROPHILS NFR BLD: 79.3 % (ref 38–73)
PHOSPHATE SERPL-MCNC: 4.6 MG/DL (ref 2.7–4.5)
PLATELET # BLD AUTO: 177 K/UL (ref 150–350)
PMV BLD AUTO: 9.5 FL (ref 9.2–12.9)
POTASSIUM SERPL-SCNC: 3.4 MMOL/L (ref 3.5–5.1)
PROT UR-MCNC: 269 MG/DL (ref 0–15)
RBC # BLD AUTO: 4.09 M/UL (ref 4.6–6.2)
SODIUM SERPL-SCNC: 144 MMOL/L (ref 136–145)
WBC # BLD AUTO: 9.1 K/UL (ref 3.9–12.7)

## 2019-04-06 PROCEDURE — G8979 MOBILITY GOAL STATUS: HCPCS | Mod: CJ

## 2019-04-06 PROCEDURE — 84166 PATHOLOGIST INTERPRETATION UPE: ICD-10-PCS | Mod: 26,,, | Performed by: PATHOLOGY

## 2019-04-06 PROCEDURE — 25000242 PHARM REV CODE 250 ALT 637 W/ HCPCS: Performed by: HOSPITALIST

## 2019-04-06 PROCEDURE — 25000003 PHARM REV CODE 250: Performed by: INTERNAL MEDICINE

## 2019-04-06 PROCEDURE — 97161 PT EVAL LOW COMPLEX 20 MIN: CPT

## 2019-04-06 PROCEDURE — 94761 N-INVAS EAR/PLS OXIMETRY MLT: CPT

## 2019-04-06 PROCEDURE — 84166 PROTEIN E-PHORESIS/URINE/CSF: CPT | Mod: 26,,, | Performed by: PATHOLOGY

## 2019-04-06 PROCEDURE — 94640 AIRWAY INHALATION TREATMENT: CPT

## 2019-04-06 PROCEDURE — 99233 PR SUBSEQUENT HOSPITAL CARE,LEVL III: ICD-10-PCS | Mod: ,,, | Performed by: INTERNAL MEDICINE

## 2019-04-06 PROCEDURE — 92611 MOTION FLUOROSCOPY/SWALLOW: CPT

## 2019-04-06 PROCEDURE — 63600175 PHARM REV CODE 636 W HCPCS: Performed by: INTERNAL MEDICINE

## 2019-04-06 PROCEDURE — 84100 ASSAY OF PHOSPHORUS: CPT

## 2019-04-06 PROCEDURE — 20000000 HC ICU ROOM

## 2019-04-06 PROCEDURE — 25000242 PHARM REV CODE 250 ALT 637 W/ HCPCS: Performed by: INTERNAL MEDICINE

## 2019-04-06 PROCEDURE — 80048 BASIC METABOLIC PNL TOTAL CA: CPT

## 2019-04-06 PROCEDURE — 63600175 PHARM REV CODE 636 W HCPCS: Performed by: HOSPITALIST

## 2019-04-06 PROCEDURE — G8978 MOBILITY CURRENT STATUS: HCPCS | Mod: CK

## 2019-04-06 PROCEDURE — 25000003 PHARM REV CODE 250: Performed by: NURSE PRACTITIONER

## 2019-04-06 PROCEDURE — 84166 PROTEIN E-PHORESIS/URINE/CSF: CPT

## 2019-04-06 PROCEDURE — 84156 ASSAY OF PROTEIN URINE: CPT

## 2019-04-06 PROCEDURE — 99233 SBSQ HOSP IP/OBS HIGH 50: CPT | Mod: ,,, | Performed by: INTERNAL MEDICINE

## 2019-04-06 PROCEDURE — 25000003 PHARM REV CODE 250

## 2019-04-06 PROCEDURE — 36415 COLL VENOUS BLD VENIPUNCTURE: CPT

## 2019-04-06 PROCEDURE — 97116 GAIT TRAINING THERAPY: CPT

## 2019-04-06 PROCEDURE — 85025 COMPLETE CBC W/AUTO DIFF WBC: CPT

## 2019-04-06 PROCEDURE — 25000003 PHARM REV CODE 250: Performed by: HOSPITALIST

## 2019-04-06 PROCEDURE — 25000003 PHARM REV CODE 250: Performed by: SPECIALIST

## 2019-04-06 PROCEDURE — 27000221 HC OXYGEN, UP TO 24 HOURS

## 2019-04-06 RX ORDER — HYDRALAZINE HYDROCHLORIDE 25 MG/1
25 TABLET, FILM COATED ORAL EVERY 8 HOURS
Status: DISCONTINUED | OUTPATIENT
Start: 2019-04-06 | End: 2019-04-07

## 2019-04-06 RX ORDER — HYDRALAZINE HYDROCHLORIDE 25 MG/1
TABLET, FILM COATED ORAL
Status: COMPLETED
Start: 2019-04-06 | End: 2019-04-06

## 2019-04-06 RX ADMIN — HYDRALAZINE HYDROCHLORIDE 25 MG: 25 TABLET, FILM COATED ORAL at 09:04

## 2019-04-06 RX ADMIN — BUDESONIDE 0.5 MG: 0.5 SUSPENSION RESPIRATORY (INHALATION) at 07:04

## 2019-04-06 RX ADMIN — ESCITALOPRAM OXALATE 10 MG: 10 TABLET, FILM COATED ORAL at 08:04

## 2019-04-06 RX ADMIN — FUROSEMIDE 10 MG/HR: 10 INJECTION, SOLUTION INTRAVENOUS at 08:04

## 2019-04-06 RX ADMIN — FERROUS SULFATE TAB EC 325 MG (65 MG FE EQUIVALENT) 325 MG: 325 (65 FE) TABLET DELAYED RESPONSE at 08:04

## 2019-04-06 RX ADMIN — LEVALBUTEROL 1.25 MG: 1.25 SOLUTION, CONCENTRATE RESPIRATORY (INHALATION) at 07:04

## 2019-04-06 RX ADMIN — IPRATROPIUM BROMIDE 0.5 MG: 0.5 SOLUTION RESPIRATORY (INHALATION) at 01:04

## 2019-04-06 RX ADMIN — IPRATROPIUM BROMIDE 0.5 MG: 0.5 SOLUTION RESPIRATORY (INHALATION) at 07:04

## 2019-04-06 RX ADMIN — FERROUS SULFATE TAB EC 325 MG (65 MG FE EQUIVALENT) 325 MG: 325 (65 FE) TABLET DELAYED RESPONSE at 02:04

## 2019-04-06 RX ADMIN — CARVEDILOL 6.25 MG: 6.25 TABLET, FILM COATED ORAL at 08:04

## 2019-04-06 RX ADMIN — CEFTRIAXONE 2 G: 2 INJECTION, SOLUTION INTRAVENOUS at 04:04

## 2019-04-06 RX ADMIN — OXYBUTYNIN CHLORIDE 5 MG: 5 TABLET, EXTENDED RELEASE ORAL at 08:04

## 2019-04-06 RX ADMIN — ENOXAPARIN SODIUM 90 MG: 100 INJECTION SUBCUTANEOUS at 12:04

## 2019-04-06 RX ADMIN — FOLIC ACID 1 MG: 1 TABLET ORAL at 08:04

## 2019-04-06 RX ADMIN — PANTOPRAZOLE SODIUM 40 MG: 40 TABLET, DELAYED RELEASE ORAL at 08:04

## 2019-04-06 RX ADMIN — TRAZODONE HYDROCHLORIDE 50 MG: 50 TABLET ORAL at 08:04

## 2019-04-06 RX ADMIN — HYDRALAZINE HYDROCHLORIDE 25 MG: 25 TABLET, FILM COATED ORAL at 04:04

## 2019-04-06 RX ADMIN — DOXYCYCLINE 100 MG: 100 INJECTION, POWDER, LYOPHILIZED, FOR SOLUTION INTRAVENOUS at 04:04

## 2019-04-06 RX ADMIN — CLOPIDOGREL BISULFATE 75 MG: 75 TABLET, FILM COATED ORAL at 08:04

## 2019-04-06 RX ADMIN — ATORVASTATIN CALCIUM 20 MG: 20 TABLET, FILM COATED ORAL at 08:04

## 2019-04-06 RX ADMIN — IPRATROPIUM BROMIDE 0.5 MG: 0.5 SOLUTION RESPIRATORY (INHALATION) at 12:04

## 2019-04-06 RX ADMIN — DOXYCYCLINE 100 MG: 100 INJECTION, POWDER, LYOPHILIZED, FOR SOLUTION INTRAVENOUS at 03:04

## 2019-04-06 NOTE — PROGRESS NOTES
Progress Note  Hospital Medicine  Patient Name:Duc Spears  MRN:  [id number]  Patient Class: IP- Inpatient  Admit Date: 4/3/2019  Length of Stay: 3 days  Expected Discharge Date:   Attending Physician: Mimi Landa MD  Primary Care Provider:  Primary Doctor No    SUBJECTIVE:     Principal Problem: Acute on chronic respiratory failure with hypoxia  Initial history of present illness: Mr. Spears is a 75yo M with a PMH of HTN, Chronic Respiratory failure O2 Dependent, and CAD. He presented to [place] with c/o Dyspnea. It started 2 days ago and got progressively worst. On arrival to [place], he was in respiratory distress and hypoxic. He was found to be in acute CHF and given nebulizers, high flow 02, and a total of 80mg IV Lasix. He was also found to have COSTA. He was transferred to [facility] for Nephrology services. On arrival to ICU, he was in respiratory distress and hypoxic. A 1L bag of IV NS was in progress on his arrival, and about 500cc was left in the bag. The infusion was stopped. Bipap therapy was initiated and he was given another 40mg IV lasix for pulmonary congestion. eICU physician ordered nebulizers. A repeat CXR was done and was essentially unchanged from the one earlier done at [place]. After initiateion of Bipap and a nebulizer treatment, he reports that he is starting to breath better. On Bipap, his SPO2 improved to 90%, up from 70's.     PMH/PSH/SH/FH/Meds: reviewed.    Symptoms/Review of Systems:  Patient seen and examined. No acute events. Working with PT this morning. Breathing slightly improved.    Diet:  Adequate intake.    Activity level:  Up with assist   Pain:  Patient reports no pain.       OBJECTIVE:   Vital Signs (Most Recent):      Temp: 97.9 °F (36.6 °C) (04/06/19 0745)  Pulse: 86 (04/06/19 0800)  Resp: (!) 25 (04/06/19 0800)  BP: (!) 177/75 (04/06/19 0800)  SpO2: 95 % (04/06/19 0800)       Vital Signs Range (Last 24H):  Temp:  [97.1 °F (36.2 °C)-98.1 °F (36.7 °C)]   Pulse:  [67-97]    Resp:  [14-45]   BP: (119-225)/()   SpO2:  [92 %-100 %]     Weight: 86.6 kg (190 lb 14.7 oz)  Body mass index is 29.03 kg/m².    Intake/Output Summary (Last 24 hours) at 4/6/2019 0849  Last data filed at 4/6/2019 0759  Gross per 24 hour   Intake 692 ml   Output 4320 ml   Net -3628 ml     Physical Examination:  Constitutional: He is oriented to person, place, and time.   HENT:   Head: Normocephalic.   Eyes: Pupils are equal, round, and reactive to light.   Neck: Normal range of motion. Neck supple. No JVD present. No tracheal deviation present.   Cardiovascular: Normal rate, regular rhythm, normal heart sounds and intact distal pulses.   Pulmonary/Chest:Diffuse rhonchi bilaterally   Abdominal: Soft. Bowel sounds are normal. He exhibits no distension. There is no tenderness.   Musculoskeletal: Normal range of motion. He exhibits no edema.   Neurological: He is alert and oriented to person, place, and time. No cranial nerve deficit.   Skin: Skin is warm, dry and intact. Capillary refill takes less than 2 seconds.   CRANIAL NERVES   CN III, IV, VI   Pupils are equal, round, and reactive to light.    CBC:  Recent Labs   Lab 04/04/19  0714 04/05/19  0316 04/06/19  0404   WBC 7.60 9.10 9.10   RBC 3.14* 3.45* 4.09*   HGB 8.1* 8.8* 10.3*   HCT 25.2* 28.0* 33.3*   * 164 177   MCV 80* 81* 81*   MCH 25.8* 25.4* 25.2*   MCHC 32.2 31.2* 31.0*   BMP  Recent Labs   Lab 04/03/19  0310 04/04/19  0448 04/05/19  0316 04/06/19  0404    114* 108 126*    141 145 144   K 4.1 4.6 3.7 3.4*    107 107 103   CO2 23 22* 27 29   BUN 81* 85* 82* 74*   CREATININE 3.9* 3.4* 3.0* 2.5*   CALCIUM 9.4 8.6* 9.0 9.5   MG 2.5  --   --   --       Diagnostic Results:  Microbiology Results (last 7 days)     Procedure Component Value Units Date/Time    Culture, Respiratory with Gram Stain [[id number]] Collected:  04/04/19 1333    Order Status:  Completed Specimen:  Respiratory from Sputum, Expectorated Updated:  04/06/19 0736      Respiratory Culture Normal respiratory lester     Gram Stain (Respiratory) <10 epithelial cells per low power field.     Gram Stain (Respiratory) Rare WBC's     Gram Stain (Respiratory) Moderate Gram positive cocci     Gram Stain (Respiratory) Few yeast    Blood culture [[id number]] Collected:  04/03/19 0343    Order Status:  Completed Specimen:  Blood Updated:  04/05/19 1212     Blood Culture, Routine No Growth to date     Blood Culture, Routine No Growth to date     Blood Culture, Routine No Growth to date         Renal US:  Features of medical renal disease.  No hydronephrosis. Cholelithiasis.    CXR:  1. Bilateral pleural effusions between small to moderate in size.  2. Bilateral airspace disease which could reflect atelectasis, pneumonia, pulmonary edema, acute lung injury/ARDS.  3. Cardiomegaly.    ECHO:  · Concentric left ventricular hypertrophy.  · Normal left ventricular systolic function. The estimated ejection fraction is 58%  · Grade III (severe) left ventricular diastolic dysfunction consistent with restrictive physiology.  · Elevated left atrial pressure.  · Low normal right ventricular systolic function.  · Moderate left atrial enlargement.  · Mild right atrial enlargement.  · Mild mitral regurgitation.  · Mild tricuspid regurgitation.  · Elevated central venous pressure (15 mm Hg).  · The estimated PA systolic pressure is 43 mm Hg  · Pulmonary hypertension present.  · There is a moderate left pleural effusion.  · Pericardial effusion.    CXR: Bilateral airspace disease and pleural effusions, similar in extent.      Assessment/Plan:   * Acute on chronic respiratory failure with hypoxia  Continue supplemental oxygen to maintain pulse ox above 92%.  Use BiPAP noninvasive ventilatory therapy as needed.  Swallowing evaluation for possible aspiration.  On IV Rocephin and doxycycline.  Follow pulmonary recommendations.  Continue beta 2 agonist bronchodilator nebulization treatments.  Continue close  monitoring in intensive care unit.     Acute on chronic diastolic congestive heart failure exacerbation  Follow Cardiology recommendations.  Echo results reviewed.  Continue intravenous IV Lasix infusion. Replete electrolytes as needed.  Hold chronic ARB due to COSTA.  Continue Coreg - titrate as needed  Monitor on telemetry.  BP elevated but inaccurate on different extremities. Will monitor.      COSTA (acute kidney injury) - improving  Hold ARB, and Sulindac for now.  Follow Nephrology recommendation.  Renal ultrasound results reviewed.  Strict I&O  Avoid NSAIDs/Nephrotoxins  Renal dose medications     Elevated d-dimer  Unable to do Chest CTA due to COSTA  Obtain VQ scan when condition stabilizes - pending as patient is unable to lay flat.  Full dose lovenox today until VQ scan results    CAD (coronary artery disease)  Continue statin and plavix  Not on chronic ASA     Essential hypertension  Chronic/Controlled. Continue chronic medications, adjusting as needed.    Nonsustained ventricular tachycardia, asymptomatic  Telemonitoring.  Check Mag level.  Continue serial cardiac enzymes.  Follow Cardiology recommendation    Physical deconditioning  PT/OT.     Critical Care  - THE PATIENT HAS A HIGH PROBABILITY OF IMMINENT OR LIFE THREATENING DETERIORATION.  Over 50%time of encounter was in direct care at bedside.  Time was 35 minutes required for patient care.     VTE Risk Mitigation (From admission, onward)        Ordered     enoxaparin injection 90 mg  Every 24 hours (non-standard times)      04/05/19 1043     IP VTE HIGH RISK PATIENT  Once      04/03/19 0252     Place TIANA hose  Until discontinued      04/03/19 0252        Duc Juarez MD  Department of Hospital Medicine   [facility]

## 2019-04-06 NOTE — PROCEDURES
"Modified Barium Swallow    Patient Name:  Duc Spears   MRN:  [id number]      Recommendations:     Recommendations:                General Recommendations:  Dysphagia therapy  Diet recommendations:  Mechanical soft(IDDSI-Minced and Moist), Nectar Thick   Aspiration Precautions: 1 bite/sip at a time, Assistance with thickening liquids, Avoid talking while eating, Double swallow with each bite/sip, Meds crushed in puree, No straws, Small bites/sips and Wear oxygen during intake   General Precautions: Standard, aspiration  Communication strategies:  none    Referral     Reason for Referral  Patient was referred for a Modified Barium Swallow Study to assess the efficiency of his/her swallow function, rule out aspiration and make recommendations regarding safe dietary consistencies, effective compensatory strategies, and safe eating environment.     Diagnosis: Acute on chronic respiratory failure with hypoxia       History:     Past Medical History:   Diagnosis Date    Cancer     skin cancer    Coronary artery disease     Hypertension        Objective:     Current Respiratory Status: 04/06/19    Alert: yes    Cooperative: yes    Follows Directions: yes    Visualization  · Patient was seen in the lateral view    Oral Peripheral Examination  · Oral Musculature: WFL  · Dentition: upper dentures(no lower dentition)  · Secretion Management: adequate  · Mucosal Quality: dry  · Mandibular Strength and Mobility: WFL  · Oral Labial Strength and Mobility: WFL  · Lingual Strength and Mobility: WFL  · Buccal Strength and Mobility: WFL  · Volitional Cough: good  · Volitional Swallow: able to palpate laryngeal rise  · Voice Prior to PO Intake: clear       04/06/19 1307   Speech Time Calculation   Speech Start Time 0950   Speech Stop Time 1008   Speech Total (min) 18 min   General Information   SLP Treatment Date 04/06/19   Chest X-ray results "Continued bilateral perihilar and lower lobe infiltrates.  Borderline heart size. " " Atherosclerosis."   Current Respiratory Status nasal cannula   General Precautions aspiration   Current Diet   Current Diet Textures Regular   Current Liquid Consistencies Thin   Subjective   Patient states Denies dysphagia        MBS Eval: Thin Liquid Trial   Mode of Presentation, Thin Liquid cup;spoon;straw   Oral Prep/Phase, Thin Liquid   (premature spillage via straw)   Oral Residue, Thin Liquid back posterior tongue   Pharyngeal Phase, Thin Liquid decreased base of tongue retraction;delayed epiglottic inversion;premature spillage;pyriform sinuses pooling;reduction in laryngeal elevation;vallecular stasis;reduced hyolaryngeal excursion   Rosenbeck's 8-Point Penetration-Aspiration Scale, Thin Liquids (8) Material enters the airway, passes below the vocal folds, and no effort is made to eject.;(5) Material enters the airway, contacts the vocal fold, and is not ejected from the airway.  (Trace, silent aspiration with thin via tsp on 1 of 2 trials)   Penetration/Aspiration, Thin Liquid Deep penetration via tsp, cup and straw.    Successful Strategies Trialed During Procedure, Thin Liquid   (Chin tuck ineffective in eliminating penetration)  Cued throat clear ineffective in clearing material from laryngeal vestibule         MBS Eval: Nectar Thick Liquid Trial   Mode of Presentation, Nectar cup;straw   Oral Prep/Phase, [place] WFL   Oral Residue, Nectar back posterior tongue   Pharyngeal Phase, Nectar reduction in laryngeal elevation;reduced hyolaryngeal excursion;delayed epiglottic inversion;decreased base of tongue retraction;vallecular stasis   Rosenbeck's 8-Point Penetration-Aspiration Scale, Nectar Thick Liquids (1) Material does not enter airway.;(3) Material enters the airway, remains above the vocal folds, and is not ejected from the airway.   Penetration/Aspiration, Nectar High penetration via straw        MBS Eval: Pureed Trial   Mode of Presentation, Puree spoon;fed by clinician   Oral Prep/Phase, Puree WFL "   Oral Residue, Puree back posterior tongue   Pharyngeal Phase, Puree impaired;decreased base of tongue retraction;decreased pharyngeal wall contraction;reduction in laryngeal elevation;reduced hyolaryngeal excursion;vallecular stasis;delayed epiglottic inversion   Rosenbeck's 8-Point Penetration-Aspiration Scale, Pureed (1) Material does not enter airway.        MBS Eval: Soft Solid Trial   Mode of Presentation, Semisolid spoon;fed by clinician   Oral Prep/Phase, Semisolid   (Premature loss of juice to pyriform sinus)   Oral Residue, Semisolid back posterior tongue   Pharyngeal Phase, Semisolid pyriform sinuses pooling;reduction in laryngeal elevation;reduced hyolaryngeal excursion;vallecular stasis;decreased base of tongue retraction;delayed epiglottic inversion   Rosenbeck's 8-Point Penetration-Aspiration Scale, Semisolid (1) Material does not enter airway.        MBS Eval: Solid Food Texture Trial   Mode of Presentation, Solid self fed   Volume of Solid Food Presented 1/4 cookie   Oral Prep/Phase, Solid WFL   Oral Residue, Solid back posterior tongue   Pharyngeal Phase, Solid decreased base of tongue retraction;reduction in laryngeal elevation;reduced hyolaryngeal excursion;vallecular stasis   Rosenbeck's 8-Point Penetration-Aspiration Scale, Solid (1) Material does not enter airway.   Successful Strategies Trialed During Procedure, Solid multiple swallows   Recommendations   Solid Diet Level Mechanical soft  (IDDSI-Minced and Moist)   Liquid Diet Level Nectar Thick   Plan   Plan of Care Expires on 04/27/19   SLP Follow-up   SLP Follow-up? Yes   SLP - Next Visit Date 04/07/19   Treatment/Billable Minutes   Motion Fluoro Swallow, Cine/Vid 18   Total Time 18       Cervical Esophageal Phase  · UES appeared to accommodate all bolus types without stasis or retrograde movement observed     Assessment:     Impressions  MBSS completed. Pt presents with mild-moderate pharyngeal dysphagia c/b general weakness of  pharyngeal musculature with reduced laryngeal elevation and BOT retraction and delayed epiglottic inversion. Trace, silent aspiration noted with thin liquids via tsp on 1 of 2 trials. Deep penetration via cup and straw. Consistent BOT and mild-moderate valleculae residue across all trials. Pt able to decreased with cued subsequent swallow. With fruit trials, there is premature loss and pooling in pyriform sinus.  REC Mechanical soft textures (IDDSI 5-Minced and Moist) no fresh/canned fruit x bananas, no mixed consistencies with NECTAR thick liquids. NO straws, swallow 2x with each bite/sip,  wear O2 with all meals. Initiate dysphagia therapy.    NOTE: IMAGES SAVED TO PACS WERE ONLY STILLS, NO VIDEO, DUE TO EQUIPMENT MALFUNCTION.       Prognosis: Good    Barriers:  · Respiratory compromise    Plan  Initiate dysphagia therapy    Education  Results were discussed with patient.    Goals:   Multidisciplinary Problems     SLP Goals        Problem: SLP Goal    Goal Priority Disciplines Outcome   SLP Goal     SLP    Description:    1) MBSS--MET  NEW: Consume recommended diet of mechanical soft (IDDSI-Minced and Moist) textures and nectar thick liquids with no overt s/s penetration/aspiration  2) Demonstrate use of compensatory swallow strategies/aspriation precautions with SPV  3) Perform CTAR, effortful swallow, Shaker and Mendelsohn with MIN cues                        Plan:   · Patient to be seen:  Therapy Frequency: 5 x/week   · Plan of Care expires:  04/27/19  · Plan of Care reviewed with:  patient        Discharge recommendations:  outpatient speech therapy   Barriers to Discharge:  None    Time Tracking:   SLP Treatment Date:   04/06/19  Speech Start Time:  0950  Speech Stop Time:  1008     Speech Total Time (min):  18 min    Roseanna Dixon CCC-SLP  04/06/2019

## 2019-04-06 NOTE — PROGRESS NOTES
"Progress Note  Pulmonary/Critical Care      Admit Date: 4/3/2019    SUBJECTIVE:     HPI/Interval history (See H&P for complete P,F,SHx) :     4/6/2019 - Stable overnight, feels that breathing is better, no new complaints at this time.  Has minimal cough, no significant sputum.  Denies chest pain.     Review of Systems: List if applicable    Review of Systems   Constitutional: Positive for malaise/fatigue. Negative for chills, diaphoresis, fever and weight loss.   HENT: Negative for congestion.    Eyes: Negative for pain.   Respiratory: Positive for cough and shortness of breath. Negative for hemoptysis, sputum production, wheezing and stridor.    Cardiovascular: Negative for chest pain, palpitations, orthopnea, claudication, leg swelling and PND.   Gastrointestinal: Negative for abdominal pain, constipation, diarrhea, heartburn, nausea and vomiting.   Genitourinary: Negative for dysuria, frequency and urgency.   Musculoskeletal: Negative for falls and myalgias.   Neurological: Negative for sensory change, focal weakness and weakness.   Psychiatric/Behavioral: Negative for depression, substance abuse and suicidal ideas. The patient is not nervous/anxious.        OBJECTIVE:     Vital Signs Range (Last 24H):  Temp:  [97.1 °F (36.2 °C)-98.1 °F (36.7 °C)]   Pulse:  [67-97]   Resp:  [14-45]   BP: (119-225)/()   SpO2:  [92 %-100 %]     I & O (Last 24H):    Intake/Output Summary (Last 24 hours) at 4/6/2019 1342  Last data filed at 4/6/2019 0759  Gross per 24 hour   Intake 512 ml   Output 3360 ml   Net -2848 ml       Estimated body mass index is 29.03 kg/m² as calculated from the following:    Height as of this encounter: 5' 8" (1.727 m).    Weight as of this encounter: 86.6 kg (190 lb 14.7 oz).    Vent Settings- Oxygen Concentration (%):  [40] 40    ABG  Recent Labs   Lab 04/02/19  1426   PH 7.31*   PO2 60.8*   PCO2 46.7*   HCO3 22.80   BE -3.60*       Physical Exam:  Physical Exam   Constitutional: He is oriented to " person, place, and time and well-developed, well-nourished, and in no distress. No distress.   HENT:   Head: Normocephalic and atraumatic.   Eyes: Pupils are equal, round, and reactive to light. Conjunctivae and EOM are normal.   Neck: Normal range of motion. Neck supple. No JVD present. No tracheal deviation present. No thyromegaly present.   Cardiovascular: Normal rate, regular rhythm and normal heart sounds. Exam reveals no gallop and no friction rub.   Pulmonary/Chest: Effort normal. No stridor. No respiratory distress. He has wheezes. He has no rales.   Decreased at bases posteriorly  Few expiratory wheezes   Abdominal: Soft. Bowel sounds are normal. He exhibits no distension. There is no tenderness. There is no rebound and no guarding.   Musculoskeletal: Normal range of motion. He exhibits no edema.   Neurological: He is alert and oriented to person, place, and time.   Skin: He is not diaphoretic.   Vitals reviewed.      Laboratory/Diagnostic Data:    Recent Labs   Lab 04/06/19  0404   WBC 9.10   HGB 10.3*   HCT 33.3*         K 3.4*      CO2 29   BUN 74*   CREATININE 2.5*       Microbiology    Microbiology Results (last 7 days)     Procedure Component Value Units Date/Time    Blood culture [[id number]] Collected:  04/03/19 0343    Order Status:  Completed Specimen:  Blood Updated:  04/06/19 1212     Blood Culture, Routine No Growth to date     Blood Culture, Routine No Growth to date     Blood Culture, Routine No Growth to date     Blood Culture, Routine No Growth to date    Culture, Respiratory with Gram Stain [[id number]] Collected:  04/04/19 1333    Order Status:  Completed Specimen:  Respiratory from Sputum, Expectorated Updated:  04/06/19 0736     Respiratory Culture Normal respiratory lester     Gram Stain (Respiratory) <10 epithelial cells per low power field.     Gram Stain (Respiratory) Rare WBC's     Gram Stain (Respiratory) Moderate Gram positive cocci     Gram Stain (Respiratory)  Few yeast          Radiology    EXAMINATION:  XR CHEST 1 VIEW    CLINICAL HISTORY:  pulmonary edema;    TECHNIQUE:  Single frontal view of the chest was performed.    COMPARISON:  Chest portable of 5 April 2019.    FINDINGS:  Calcification is noted in the aorta.  The heart size is borderline.  Bilateral perihilar lower lobe infiltrates are unchanged.  Bilateral pleural effusions are suspected.  No pneumothorax is seen.      Impression       Continued bilateral perihilar and lower lobe infiltrates.  Borderline heart size.  Atherosclerosis.      Electronically signed by: Dixon Rodriguez MD  Date: 04/06/2019  Time: 08:33     (probable bilteral effusion/atelectasis)    Medications:     atorvastatin  20 mg Oral Daily    budesonide  0.5 mg Nebulization Q12H    carvedilol  6.25 mg Oral BID    cefTRIAXone (ROCEPHIN) IVPB  2 g Intravenous Q24H    clopidogrel  75 mg Oral Daily    doxycycline (VIBRAMYCIN) IVPB  100 mg Intravenous Q12H    enoxaparin  1 mg/kg Subcutaneous Q24H    escitalopram oxalate  10 mg Oral Daily    ferrous sulfate  325 mg Oral TID    folic acid  1 mg Oral Daily    ipratropium  0.5 mg Nebulization Q6H    levalbuterol  1.25 mg Nebulization Q12H    oxybutynin  5 mg Oral Daily    pantoprazole  40 mg Oral Daily    traZODone  50 mg Oral QHS        furosemide (LASIX) 2 mg/mL infusion (titrating) 20 mg/hr (04/06/19 0600)       acetaminophen, albuterol-ipratropium, famotidine, hydrALAZINE, influenza, ondansetron, pneumoc 13-danie conj-dip cr(PF), ramelteon, senna-docusate 8.6-50 mg, sodium chloride 0.9%    ASSESSMENT/PLAN:     Active Problems:    Active Hospital Problems    Diagnosis  POA    *Acute on chronic respiratory failure with hypoxia [J96.21]  Yes    Aspiration into airway [T17.908A]  No    CAD (coronary artery disease) [I25.10]  Yes    Elevated d-dimer [R79.89]  Yes    COPD exacerbation [J44.1]  Yes    Calcification of aorta [I70.0]  Yes    Acute pulmonary edema [J81.0]  Yes    Acute  diastolic congestive heart failure [I50.31]  Yes    Essential hypertension [I10]  Yes    COSTA (acute kidney injury) [N17.9]  Yes      Resolved Hospital Problems   No resolved problems to display.     Respiratory failure, hypoxemic, acute and chronic  - wean O2 as able  - use BiPAP as needed  - better, increase activities    Abnormal CXR  - looks like bilateral atelectasis and effusions  - follow    COPD  - probable  - no acute exacerbation    Pulmonary Hypertension  - aware  - no right heart strain    CHF, diastolic,acute on chronic - decompensated  - on lasix drip    COSTA  - better, renal following    Elevated d-dimer  - aware  - clinical story not c/w PE  - will check dopplers of legs    ASCVD  - aware    HTN  - aware    NSVT  - aware      I will continue to follow with the pt.  Please call me at [phone] if you have any questions.      Santiago Dumont MD

## 2019-04-06 NOTE — PROGRESS NOTES
Progress Note  Nephrology    Admit Date: 4/3/2019   LOS: 3 days     SUBJECTIVE:     CC:  COSTA    Past medical, surgical and social history reviewed    HPI: 75M with COPD on 2L home oxygen, CAD (MI ), CHF, HTN, smoker for many years (quit 2 weeks) presented with SOB, wheezing for 2 weeks, with acute worsening 2 days ago. Denies any fever. Renal consulted for COSTA.      VSS, no new complains, good UO.  / VSS, no new complains.    HGb 10, BUN/Scr continue to improve.  Pt in chair, denies chest pain or SOB.  Does c/o orthopnea.  No N/V, feels better.  Hypokalemic, on lasix gtt.  Will replete K+.    Assessment/plan:    COSTA due to infection, CHF, obstruction, volume depletion, meds  CKD stage 3-4?  Hematuria, proteinuria of unclear significance--has uti.  F/u after infection cleared  CHF exacerbation  COPD exacerbation  No NSAIDs, ACEI/ARB, IV contrast or other nephrotoxins.  Keep MAP > 60, SBP > 100.  Dose meds for GFR < 30 ml/min.  Renal diet - low K, low phos.  Treat any infection.  Cards and pulm consult appreciated.     Anemia of CKD  Stable. Monitor.  No need for DIANA.  No need for IV iron.     HTN  Controlled.   Tolerate asymptomatic HTN up to -160.  Continue home meds.  Low sodium diet.    Review of Systems:  Negative except for above.    OBJECTIVE:     Vital Signs (Most Recent)  Temp: 97.9 °F (36.6 °C) (19 0745)  Pulse: 86 (19 0800)  Resp: (!) 25 (19 08)  BP: (!) 177/75 (19 0800)  SpO2: 95 % (19 08)    Vital Signs Range (Last 24H):  Temp:  [97.1 °F (36.2 °C)-98.1 °F (36.7 °C)]   Pulse:  [67-97]   Resp:  [14-45]   BP: (119-225)/()   SpO2:  [92 %-100 %]     Temp (24hrs), Av.7 °F (36.5 °C), Min:97.1 °F (36.2 °C), Max:98.1 °F (36.7 °C)    Systolic (24hrs), Av , Min:119 , Max:225     Diastolic (24hrs), Av, Min:67, Max:125      I & O (Last 24H):    Intake/Output Summary (Last 24 hours) at 2019 0817  Last data filed at 2019 0759  Gross per 24  hour   Intake 692 ml   Output 4320 ml   Net -3628 ml     Physical Exam:  General appearance: well developed, well nourished  Eyes:  conjunctivae/corneas clear. PERRL.  Neck: supple, symmetrical, trachea midline, no JVD  Lungs:  diminished bases  Heart: regular rate and rhythm, S1, S2 normal, no murmur, click, rub or gallop  Abdomen: soft, non-tender non-distented; bowel sounds normal; no masses,  no organomegaly  Extremities: no cyanosis or edema, or clubbing  Skin: w/d  Neurologic: Mental status: Alert, oriented, thought content appropriate    Laboratory:  CBC:  Recent Labs   Lab 04/06/19  0404   WBC 9.10   RBC 4.09*   HGB 10.3*   HCT 33.3*      MCV 81*   MCH 25.2*   MCHC 31.0*     BMP:  Recent Labs   Lab 04/06/19  0404      K 3.4*      CO2 29   BUN 74*   CREATININE 2.5*   CALCIUM 9.5      CMP:   Recent Labs   Lab 04/06/19  0404   *   CALCIUM 9.5      K 3.4*   CO2 29      BUN 74*   CREATININE 2.5*     All other lab data reviewed and negative unless otherwise specified   Diagnostic Results:  Labs: Reviewed  X-Ray: Reviewed    ASSESSMENT/PLAN:     Active Hospital Problems    Diagnosis  POA    *Acute on chronic respiratory failure with hypoxia [J96.21]  Yes    Aspiration into airway [T17.908A]  No    CAD (coronary artery disease) [I25.10]  Yes    Elevated d-dimer [R79.89]  Yes    COPD exacerbation [J44.1]  Yes    Calcification of aorta [I70.0]  Yes    Acute pulmonary edema [J81.0]  Yes    Acute diastolic congestive heart failure [I50.31]  Yes    Essential hypertension [I10]  Yes    COSTA (acute kidney injury) [N17.9]  Yes      Resolved Hospital Problems   No resolved problems to display.       Darek Sethi MD  Nephrology  [facility]  [phone]

## 2019-04-06 NOTE — PLAN OF CARE
Problem: Adult Inpatient Plan of Care  Goal: Plan of Care Review  Outcome: Ongoing (interventions implemented as appropriate)  Patient receiving aerosol tx via 1.25mg xopenex now and q12hr, 0.5mg atrovent now and q6hr and 0.5mg pulmicort now and q12hr.  Hr 91 and 02 saturation 96% on nc at 5lpm.

## 2019-04-06 NOTE — PLAN OF CARE
04/05/19 1907   Patient Assessment/Suction   Level of Consciousness (AVPU) alert   Respiratory Effort Unlabored   All Lung Fields Breath Sounds diminished   PRE-TX-O2   O2 Device (Oxygen Therapy) nasal cannula   $ Is the patient on Low Flow Oxygen? Yes   Flow (L/min) 5   SpO2 96 %   Pulse Oximetry Type Continuous   $ Pulse Oximetry - Multiple Charge Pulse Oximetry - Multiple   Pulse 80   Resp 16   Aerosol Therapy   $ Aerosol Therapy Charges Aerosol Treatment   Respiratory Treatment Status (SVN) given   Treatment Route (SVN) mask   Patient Position (SVN) HOB elevated   Post Treatment Assessment (SVN) breath sounds unchanged   Signs of Intolerance (SVN) none   Breath Sounds Post-Respiratory Treatment   Throughout All Fields Post-Treatment no change   Post-treatment Heart Rate (beats/min) 82   Post-treatment Resp Rate (breaths/min) 16   Preset CPAP/BiPAP Settings   Mode Of Delivery Standby

## 2019-04-06 NOTE — PLAN OF CARE
Problem: Adult Inpatient Plan of Care  Goal: Plan of Care Review  Outcome: Ongoing (interventions implemented as appropriate)  Better today.  Able to walk in [place], with walker and PT.  Remained in chair for an hour, then able to stand and transfer into specialty chair for MBSS in radiology dept.  Tolerated swallow study, returned to room and able to get into bed with stank-by assist. Only.  All of this activity with NC at 5L.  Drank all of Boost Plus drink at lunch.  Now eating supper, which is preferred consistency with liquids of nectar consistency.

## 2019-04-06 NOTE — PROGRESS NOTES
Progress Note  Cardiology    Admit Date: 4/3/2019   LOS: 3 days   Follow-up For:  cardiology    Scheduled Meds:   atorvastatin  20 mg Oral Daily    budesonide  0.5 mg Nebulization Q12H    carvedilol  6.25 mg Oral BID    cefTRIAXone (ROCEPHIN) IVPB  2 g Intravenous Q24H    clopidogrel  75 mg Oral Daily    doxycycline (VIBRAMYCIN) IVPB  100 mg Intravenous Q12H    enoxaparin  1 mg/kg Subcutaneous Q24H    escitalopram oxalate  10 mg Oral Daily    ferrous sulfate  325 mg Oral TID    folic acid  1 mg Oral Daily    ipratropium  0.5 mg Nebulization Q6H    levalbuterol  1.25 mg Nebulization Q12H    oxybutynin  5 mg Oral Daily    pantoprazole  40 mg Oral Daily    traZODone  50 mg Oral QHS     Continuous Infusions:   furosemide (LASIX) 2 mg/mL infusion (titrating) 10 mg/hr (04/06/19 0832)     PRN Meds:acetaminophen, albuterol-ipratropium, famotidine, hydrALAZINE, influenza, ondansetron, pneumoc 13-danie conj-dip cr(PF), ramelteon, senna-docusate 8.6-50 mg, sodium chloride 0.9%    Review of patient's allergies indicates:   Allergen Reactions    Codeine Nausea And Vomiting       SUBJECTIVE:     Interval History: Patient has no complaint of  Cp/ sob..        OBJECTIVE:     Vital Signs (Most Recent)  Temp: 98.1 °F (36.7 °C) (04/06/19 1245)  Pulse: 85 (04/06/19 1615)  Resp: (!) 42 (04/06/19 1615)  BP: (!) 213/86 (04/06/19 1530)  SpO2: 100 % (04/06/19 1615)    Vital Signs Range (Last 24H):  Temp:  [97.1 °F (36.2 °C)-98.1 °F (36.7 °C)]   Pulse:  [66-98]   Resp:  [14-59]   BP: (119-221)/()   SpO2:  [93 %-100 %]       Physical Exam:  Neck: no carotid bruit and no JVD  Lungs: diminished breath sounds bilaterally  Heart: regular rate and rhythm    Recent Results (from the past 24 hour(s))   CBC auto differential    Collection Time: 04/06/19  4:04 AM   Result Value Ref Range    WBC 9.10 3.90 - 12.70 K/uL    RBC 4.09 (L) 4.60 - 6.20 M/uL    Hemoglobin 10.3 (L) 14.0 - 18.0 g/dL    Hematocrit 33.3 (L) 40.0 - 54.0 %     MCV 81 (L) 82 - 98 fL    MCH 25.2 (L) 27.0 - 31.0 pg    MCHC 31.0 (L) 32.0 - 36.0 g/dL    RDW 18.9 (H) 11.5 - 14.5 %    Platelets 177 150 - 350 K/uL    MPV 9.5 9.2 - 12.9 fL    Gran # (ANC) 7.3 1.8 - 7.7 K/uL    Lymph # 1.0 1.0 - 4.8 K/uL    Mono # 0.7 0.3 - 1.0 K/uL    Eos # 0.1 0.0 - 0.5 K/uL    Baso # 0.10 0.00 - 0.20 K/uL    Gran% 79.3 (H) 38.0 - 73.0 %    Lymph% 11.4 (L) 18.0 - 48.0 %    Mono% 7.7 4.0 - 15.0 %    Eosinophil% 1.0 0.0 - 8.0 %    Basophil% 0.6 0.0 - 1.9 %    Differential Method Automated    Phosphorus - if not done in ED    Collection Time: 04/06/19  4:04 AM   Result Value Ref Range    Phosphorus 4.6 (H) 2.7 - 4.5 mg/dL   Basic metabolic panel     Collection Time: 04/06/19  4:04 AM   Result Value Ref Range    Sodium 144 136 - 145 mmol/L    Potassium 3.4 (L) 3.5 - 5.1 mmol/L    Chloride 103 95 - 110 mmol/L    CO2 29 23 - 29 mmol/L    Glucose 126 (H) 70 - 110 mg/dL    BUN, Bld 74 (H) 8 - 23 mg/dL    Creatinine 2.5 (H) 0.5 - 1.4 mg/dL    Calcium 9.5 8.7 - 10.5 mg/dL    Anion Gap 12 8 - 16 mmol/L    eGFR if African American 29 (A) >60 mL/min/1.73 m^2    eGFR if non African American 25 (A) >60 mL/min/1.73 m^2   Protein, Quantitative, Urine Random    Collection Time: 04/06/19  2:41 PM   Result Value Ref Range    Protein, Urine Random 269 (H) 0 - 15 mg/dL       Diagnostic Results:  Labs: Reviewed  X-Ray: Reviewed    ASSESSMENT/PLAN:     Stable HBP    Plan: Continue Current.

## 2019-04-06 NOTE — PLAN OF CARE
Problem: Adult Inpatient Plan of Care  Goal: Plan of Care Review  Outcome: Ongoing (interventions implemented as appropriate)  Has remained off Bipap all day, tolerating NC at 5L/hr.  Does become somewhat SOB with exertion, although seems slightly improved since early am.  Lasix drip infusing at 20mg/hr, with adequate urine output measured.  BP cuff very positional, with readings varying widely depending on which limb has BP cuff attached.  (Reading high with cuff on right leg, more normal with cuff on left leg.)  Has denied pain all this shift.  Appetite very poor, unable to eat, even with assistance.  Given Boost Plus drink, which he is sipping.

## 2019-04-06 NOTE — PT/OT/SLP EVAL
Physical Therapy Evaluation    Patient Name:  Duc Spears   MRN:  [id number]    Recommendations:     Discharge Recommendations:  home   Discharge Equipment Recommendations: walker, rolling   Barriers to discharge: None    Assessment:     Duc Spears is a 74 y.o. male admitted with a medical diagnosis of Acute on chronic respiratory failure with hypoxia.  He presents with the following impairments/functional limitations:  weakness, impaired endurance, impaired self care skills, impaired functional mobilty, gait instability, impaired balance, impaired cardiopulmonary response to activity, pain . Pt with multiple medical equipments but motivated for OOB, pt ambulated at hallways with fatigue and slight heaviness with breathing. Pt to benefit from continued thearpies    Rehab Prognosis: Fair; patient would benefit from acute skilled PT services to address these deficits and reach maximum level of function.    Recent Surgery: Procedure(s) (LRB):  Bronchoscopy - need to cleanout, in icu best.  marginal pt (N/A)      Plan:     During this hospitalization, patient to be seen 6 x/week to address the identified rehab impairments via gait training, therapeutic activities, therapeutic exercises and progress toward the following goals:    · Plan of Care Expires:  04/26/19    Subjective   It feels good to walk  Chief Complaint: a little CP L side  Patient/Family Comments/goals: get well  Pain/Comfort:  · Pain Rating 1: 4/10  · Location - Side 1: Left  · Location 1: chest  · Pain Addressed 1: Reposition, Distraction    Patients cultural, spiritual, L[employer] conflicts given the current situation:      Living Environment:  Home with family  Prior to admission, patients level of function was ambulatory with no AD, uses O2 at nights only.  Equipment used at home: oxygen.  DME owned (not currently used): none.  Upon discharge, patient will have assistance from family.    Objective:     Communicated with nurse Metcalf  S prior to session.  Patient found HOB elevated with blood pressure cuff, peripheral IV, telemetry, oxygen, pulse ox (continuous)  upon PT entry to room.    General Precautions: Standard, fall, respiratory   Orthopedic Precautions:N/A   Braces: N/A     Exams:  · Postural Exam:  Patient presented with the following abnormalities:    · -       Rounded shoulders  · -       Forward head  · -       Kyphosis  · RLE ROM: WFL  · RLE Strength: WFL  · LLE ROM: WFL  · LLE Strength: WFL    Functional Mobility:  · Bed Mobility:     · Rolling Right: supervision  · Scooting: contact guard assistance  · Supine to Sit: contact guard assistance  · Transfers:     · Sit to Stand:  minimum assistance with rolling walker  · Bed to Chair: minimum assistance with  rolling walker  using  Stand Pivot  · Gait: 100ft with RW/O2 at 5 liters min assist      Therapeutic Activities and Exercises:   OOB to chair post PT with all lines intact  Encouraged increased OOB time    AM-PAC 6 CLICK MOBILITY  Total Score:18     Patient left up in chair with all lines intact, call button in reach and nurse Dixon and Dr Nair present.    GOALS:   Multidisciplinary Problems     Physical Therapy Goals        Problem: Physical Therapy Goal    Goal Priority Disciplines Outcome Goal Variances Interventions   Physical Therapy Goal     PT, PT/OT Ongoing (interventions implemented as appropriate)     Description:  Goals to be met by: 2019     Patient will increase functional independence with mobility by performin. Supine to sit with Stand-by Assistance  2. Sit to stand transfer with Stand-by Assistance  3. Bed to chair transfer with Stand-by Assistance   4. Gait  x 250 feet with Contact Guard Assistance using Rolling Walker.   5. Lower extremity exercise program x20 reps                     History:     Past Medical History:   Diagnosis Date    Cancer     skin cancer    Coronary artery disease     Hypertension        Past Surgical History:    Procedure Laterality Date    APPENDECTOMY      TONSILLECTOMY         Time Tracking:     PT Received On: 04/06/19  PT Start Time: 0820     PT Stop Time: 0847  PT Total Time (min): 27 min     Billable Minutes: Evaluation 10 and Gait Training 17      Lynne Sellers, PT  04/06/2019

## 2019-04-06 NOTE — PLAN OF CARE
Problem: Adult Inpatient Plan of Care  Goal: Plan of Care Review  Outcome: Ongoing (interventions implemented as appropriate)  BP remained high entire shift. Remains on lasix gtt @ 20 mg/hr. On and off BiPAP throughout shift.  Adequate UO.  Large BM this morning.  Safety maintained entire shift.

## 2019-04-06 NOTE — PLAN OF CARE
Problem: SLP Goal  Goal: SLP Goal    1) MBSS--MET  NEW: Consume recommended diet of mechanical soft (IDDSI-Minced and Moist) textures and nectar thick liquids with no overt s/s penetration/aspiration  2) Demonstrate use of compensatory swallow strategies/aspriation precautions with SPV  3) Perform CTAR, effortful swallow, Shaker and Mendelsohn with MIN cues       MBSS completed. Pt presents with mild-moderate pharyngeal dysphagia. Trace, silent aspiration noted with thin liquids. REC Mechanical soft textures (IDDSI 5-Minced and Moist) with NECTAR thick liquids.  No fresh or canned fruit x bananas, no mixed consistencies. NO straws, swallow 2x with each bite/sip, wear O2 with all meals. Initiate dysphagia therapy.    Roseanna Dixon MS, CCC/SLP 4/6/2019

## 2019-04-06 NOTE — PLAN OF CARE
Problem: Physical Therapy Goal  Goal: Physical Therapy Goal  Goals to be met by: 2019     Patient will increase functional independence with mobility by performin. Supine to sit with Stand-by Assistance  2. Sit to stand transfer with Stand-by Assistance  3. Bed to chair transfer with Stand-by Assistance   4. Gait  x 250 feet with Contact Guard Assistance using Rolling Walker.   5. Lower extremity exercise program x20 reps   Outcome: Ongoing (interventions implemented as appropriate)  PT eval and treat completed. Gait 100ft with RW/O2 at 5 liters.. OOB to chair

## 2019-04-07 LAB
ANION GAP SERPL CALC-SCNC: 12 MMOL/L (ref 8–16)
BASOPHILS # BLD AUTO: 0 K/UL (ref 0–0.2)
BASOPHILS NFR BLD: 0.2 % (ref 0–1.9)
BUN SERPL-MCNC: 65 MG/DL (ref 8–23)
C3 SERPL-MCNC: 69 MG/DL (ref 50–180)
C4 SERPL-MCNC: 26 MG/DL (ref 11–44)
CALCIUM SERPL-MCNC: 9.3 MG/DL (ref 8.7–10.5)
CHLORIDE SERPL-SCNC: 103 MMOL/L (ref 95–110)
CO2 SERPL-SCNC: 31 MMOL/L (ref 23–29)
CREAT SERPL-MCNC: 2.4 MG/DL (ref 0.5–1.4)
DIFFERENTIAL METHOD: ABNORMAL
EOSINOPHIL # BLD AUTO: 0.1 K/UL (ref 0–0.5)
EOSINOPHIL NFR BLD: 1 % (ref 0–8)
ERYTHROCYTE [DISTWIDTH] IN BLOOD BY AUTOMATED COUNT: 19.1 % (ref 11.5–14.5)
EST. GFR  (AFRICAN AMERICAN): 30 ML/MIN/1.73 M^2
EST. GFR  (NON AFRICAN AMERICAN): 26 ML/MIN/1.73 M^2
GLUCOSE SERPL-MCNC: 120 MG/DL (ref 70–110)
HCT VFR BLD AUTO: 30.5 % (ref 40–54)
HGB BLD-MCNC: 9.6 G/DL (ref 14–18)
LYMPHOCYTES # BLD AUTO: 1.1 K/UL (ref 1–4.8)
LYMPHOCYTES NFR BLD: 11.9 % (ref 18–48)
MAGNESIUM SERPL-MCNC: 1.8 MG/DL (ref 1.6–2.6)
MCH RBC QN AUTO: 25.5 PG (ref 27–31)
MCHC RBC AUTO-ENTMCNC: 31.5 G/DL (ref 32–36)
MCV RBC AUTO: 81 FL (ref 82–98)
MONOCYTES # BLD AUTO: 0.8 K/UL (ref 0.3–1)
MONOCYTES NFR BLD: 8.6 % (ref 4–15)
NEUTROPHILS # BLD AUTO: 7.2 K/UL (ref 1.8–7.7)
NEUTROPHILS NFR BLD: 78.3 % (ref 38–73)
PHOSPHATE SERPL-MCNC: 3.8 MG/DL (ref 2.7–4.5)
PLATELET # BLD AUTO: 170 K/UL (ref 150–350)
PMV BLD AUTO: 8.6 FL (ref 9.2–12.9)
POTASSIUM SERPL-SCNC: 3.2 MMOL/L (ref 3.5–5.1)
RBC # BLD AUTO: 3.78 M/UL (ref 4.6–6.2)
SODIUM SERPL-SCNC: 146 MMOL/L (ref 136–145)
WBC # BLD AUTO: 9.1 K/UL (ref 3.9–12.7)

## 2019-04-07 PROCEDURE — 27000221 HC OXYGEN, UP TO 24 HOURS

## 2019-04-07 PROCEDURE — 85025 COMPLETE CBC W/AUTO DIFF WBC: CPT

## 2019-04-07 PROCEDURE — 84165 PATHOLOGIST INTERPRETATION SPE: ICD-10-PCS | Mod: 26,,, | Performed by: PATHOLOGY

## 2019-04-07 PROCEDURE — 86039 ANTINUCLEAR ANTIBODIES (ANA): CPT

## 2019-04-07 PROCEDURE — 94640 AIRWAY INHALATION TREATMENT: CPT

## 2019-04-07 PROCEDURE — 63600175 PHARM REV CODE 636 W HCPCS: Performed by: INTERNAL MEDICINE

## 2019-04-07 PROCEDURE — 25000003 PHARM REV CODE 250: Performed by: SPECIALIST

## 2019-04-07 PROCEDURE — 94760 N-INVAS EAR/PLS OXIMETRY 1: CPT

## 2019-04-07 PROCEDURE — 25000003 PHARM REV CODE 250: Performed by: NURSE PRACTITIONER

## 2019-04-07 PROCEDURE — 94660 CPAP INITIATION&MGMT: CPT

## 2019-04-07 PROCEDURE — 25000003 PHARM REV CODE 250: Performed by: INTERNAL MEDICINE

## 2019-04-07 PROCEDURE — 86160 COMPLEMENT ANTIGEN: CPT | Mod: 59

## 2019-04-07 PROCEDURE — 86038 ANTINUCLEAR ANTIBODIES: CPT

## 2019-04-07 PROCEDURE — 86235 NUCLEAR ANTIGEN ANTIBODY: CPT | Mod: 59

## 2019-04-07 PROCEDURE — 99232 SBSQ HOSP IP/OBS MODERATE 35: CPT | Mod: ,,, | Performed by: INTERNAL MEDICINE

## 2019-04-07 PROCEDURE — 25000242 PHARM REV CODE 250 ALT 637 W/ HCPCS: Performed by: HOSPITALIST

## 2019-04-07 PROCEDURE — 36415 COLL VENOUS BLD VENIPUNCTURE: CPT

## 2019-04-07 PROCEDURE — 86160 COMPLEMENT ANTIGEN: CPT

## 2019-04-07 PROCEDURE — 25000003 PHARM REV CODE 250: Performed by: HOSPITALIST

## 2019-04-07 PROCEDURE — 94761 N-INVAS EAR/PLS OXIMETRY MLT: CPT

## 2019-04-07 PROCEDURE — 80048 BASIC METABOLIC PNL TOTAL CA: CPT

## 2019-04-07 PROCEDURE — 84100 ASSAY OF PHOSPHORUS: CPT

## 2019-04-07 PROCEDURE — 99900035 HC TECH TIME PER 15 MIN (STAT)

## 2019-04-07 PROCEDURE — 11000001 HC ACUTE MED/SURG PRIVATE ROOM

## 2019-04-07 PROCEDURE — 63600175 PHARM REV CODE 636 W HCPCS: Performed by: HOSPITALIST

## 2019-04-07 PROCEDURE — 25000242 PHARM REV CODE 250 ALT 637 W/ HCPCS: Performed by: INTERNAL MEDICINE

## 2019-04-07 PROCEDURE — 84165 PROTEIN E-PHORESIS SERUM: CPT | Mod: 26,,, | Performed by: PATHOLOGY

## 2019-04-07 PROCEDURE — 84165 PROTEIN E-PHORESIS SERUM: CPT

## 2019-04-07 PROCEDURE — 99232 PR SUBSEQUENT HOSPITAL CARE,LEVL II: ICD-10-PCS | Mod: ,,, | Performed by: INTERNAL MEDICINE

## 2019-04-07 PROCEDURE — 83735 ASSAY OF MAGNESIUM: CPT

## 2019-04-07 PROCEDURE — 63600175 PHARM REV CODE 636 W HCPCS: Performed by: NURSE PRACTITIONER

## 2019-04-07 RX ORDER — HYDRALAZINE HYDROCHLORIDE 25 MG/1
50 TABLET, FILM COATED ORAL EVERY 8 HOURS
Status: DISCONTINUED | OUTPATIENT
Start: 2019-04-07 | End: 2019-04-08

## 2019-04-07 RX ORDER — POTASSIUM CHLORIDE 7.45 MG/ML
40 INJECTION INTRAVENOUS ONCE
Status: COMPLETED | OUTPATIENT
Start: 2019-04-07 | End: 2019-04-07

## 2019-04-07 RX ORDER — ENOXAPARIN SODIUM 100 MG/ML
40 INJECTION SUBCUTANEOUS EVERY 24 HOURS
Status: DISCONTINUED | OUTPATIENT
Start: 2019-04-07 | End: 2019-04-08 | Stop reason: DRUGHIGH

## 2019-04-07 RX ADMIN — CARVEDILOL 6.25 MG: 6.25 TABLET, FILM COATED ORAL at 08:04

## 2019-04-07 RX ADMIN — ESCITALOPRAM OXALATE 10 MG: 10 TABLET, FILM COATED ORAL at 08:04

## 2019-04-07 RX ADMIN — ACETAMINOPHEN 650 MG: 325 TABLET ORAL at 08:04

## 2019-04-07 RX ADMIN — FOLIC ACID 1 MG: 1 TABLET ORAL at 08:04

## 2019-04-07 RX ADMIN — POTASSIUM CHLORIDE 40 MEQ: 7.46 INJECTION, SOLUTION INTRAVENOUS at 08:04

## 2019-04-07 RX ADMIN — CEFTRIAXONE 2 G: 2 INJECTION, SOLUTION INTRAVENOUS at 03:04

## 2019-04-07 RX ADMIN — ENOXAPARIN SODIUM 40 MG: 100 INJECTION SUBCUTANEOUS at 06:04

## 2019-04-07 RX ADMIN — HYDRALAZINE HYDROCHLORIDE 25 MG: 25 TABLET, FILM COATED ORAL at 08:04

## 2019-04-07 RX ADMIN — DOXYCYCLINE 100 MG: 100 INJECTION, POWDER, LYOPHILIZED, FOR SOLUTION INTRAVENOUS at 06:04

## 2019-04-07 RX ADMIN — DOXYCYCLINE 100 MG: 100 INJECTION, POWDER, LYOPHILIZED, FOR SOLUTION INTRAVENOUS at 03:04

## 2019-04-07 RX ADMIN — CLOPIDOGREL BISULFATE 75 MG: 75 TABLET, FILM COATED ORAL at 08:04

## 2019-04-07 RX ADMIN — ONDANSETRON 4 MG: 2 INJECTION INTRAMUSCULAR; INTRAVENOUS at 07:04

## 2019-04-07 RX ADMIN — FERROUS SULFATE TAB EC 325 MG (65 MG FE EQUIVALENT) 325 MG: 325 (65 FE) TABLET DELAYED RESPONSE at 08:04

## 2019-04-07 RX ADMIN — TRAZODONE HYDROCHLORIDE 50 MG: 50 TABLET ORAL at 08:04

## 2019-04-07 RX ADMIN — PANTOPRAZOLE SODIUM 40 MG: 40 TABLET, DELAYED RELEASE ORAL at 08:04

## 2019-04-07 RX ADMIN — ATORVASTATIN CALCIUM 20 MG: 20 TABLET, FILM COATED ORAL at 08:04

## 2019-04-07 RX ADMIN — FUROSEMIDE 10 MG/HR: 10 INJECTION, SOLUTION INTRAVENOUS at 08:04

## 2019-04-07 RX ADMIN — LEVALBUTEROL 1.25 MG: 1.25 SOLUTION, CONCENTRATE RESPIRATORY (INHALATION) at 08:04

## 2019-04-07 RX ADMIN — BUDESONIDE 0.5 MG: 0.5 SUSPENSION RESPIRATORY (INHALATION) at 07:04

## 2019-04-07 RX ADMIN — IPRATROPIUM BROMIDE 0.5 MG: 0.5 SOLUTION RESPIRATORY (INHALATION) at 01:04

## 2019-04-07 RX ADMIN — OXYBUTYNIN CHLORIDE 5 MG: 5 TABLET, EXTENDED RELEASE ORAL at 08:04

## 2019-04-07 RX ADMIN — FERROUS SULFATE TAB EC 325 MG (65 MG FE EQUIVALENT) 325 MG: 325 (65 FE) TABLET DELAYED RESPONSE at 03:04

## 2019-04-07 RX ADMIN — HYDRALAZINE HYDROCHLORIDE 50 MG: 25 TABLET, FILM COATED ORAL at 08:04

## 2019-04-07 RX ADMIN — IPRATROPIUM BROMIDE 0.5 MG: 0.5 SOLUTION RESPIRATORY (INHALATION) at 08:04

## 2019-04-07 RX ADMIN — BUDESONIDE 0.5 MG: 0.5 SUSPENSION RESPIRATORY (INHALATION) at 08:04

## 2019-04-07 RX ADMIN — HYDRALAZINE HYDROCHLORIDE 25 MG: 25 TABLET, FILM COATED ORAL at 02:04

## 2019-04-07 RX ADMIN — LEVALBUTEROL 1.25 MG: 1.25 SOLUTION, CONCENTRATE RESPIRATORY (INHALATION) at 07:04

## 2019-04-07 RX ADMIN — IPRATROPIUM BROMIDE 0.5 MG: 0.5 SOLUTION RESPIRATORY (INHALATION) at 07:04

## 2019-04-07 NOTE — PLAN OF CARE
Problem: Adult Inpatient Plan of Care  Goal: Plan of Care Review  Outcome: Ongoing (interventions implemented as appropriate)  Pt did not use BiPAP entire shift.  Currently on 4LNC.  Remains on Lasix gtt.  Safety maintained entire shift.

## 2019-04-07 NOTE — NURSING
Pt transferred from ICU at 1722. Pt resting comfortably in bed. Lasix gtt infusing at 10mg/hr. Pt in NAD. Will continue to monitor.

## 2019-04-07 NOTE — PROGRESS NOTES
"Progress Note  Pulmonary/Critical Care      Admit Date: 4/3/2019    SUBJECTIVE:     HPI/Interval history (See H&P for complete P,F,SHx) :     4/6/2019 - Stable overnight, feels that breathing is better, no new complaints at this time.  Has minimal cough, no significant sputum.  Denies chest pain.     4/7/2019 - Stable overnight, didn't sleep well but otherwise is feeling OK.  No new respiratory issues reported.  Possible transfer to floor today.  Still on lasix drip.    Review of Systems: List if applicable    Review of Systems   Constitutional: Positive for malaise/fatigue. Negative for chills, diaphoresis, fever and weight loss.   HENT: Negative for congestion.    Eyes: Negative for pain.   Respiratory: Positive for cough and shortness of breath. Negative for hemoptysis, sputum production, wheezing and stridor.    Cardiovascular: Negative for chest pain, palpitations, orthopnea, claudication, leg swelling and PND.   Gastrointestinal: Negative for abdominal pain, constipation, diarrhea, heartburn, nausea and vomiting.   Genitourinary: Negative for dysuria, frequency and urgency.   Musculoskeletal: Negative for falls and myalgias.   Neurological: Negative for sensory change, focal weakness and weakness.   Psychiatric/Behavioral: Negative for depression, substance abuse and suicidal ideas. The patient is not nervous/anxious.        OBJECTIVE:     Vital Signs Range (Last 24H):  Temp:  [97.8 °F (36.6 °C)-98.3 °F (36.8 °C)]   Pulse:  []   Resp:  [13-42]   BP: ()/()   SpO2:  [93 %-100 %]     I & O (Last 24H):    Intake/Output Summary (Last 24 hours) at 4/7/2019 1449  Last data filed at 4/7/2019 0625  Gross per 24 hour   Intake 751 ml   Output 1915 ml   Net -1164 ml       Estimated body mass index is 27.05 kg/m² as calculated from the following:    Height as of this encounter: 5' 8" (1.727 m).    Weight as of this encounter: 80.7 kg (177 lb 14.6 oz).    Vent Settings- Oxygen Concentration (%):  [36-40] " 36    ABG  Recent Labs   Lab 04/02/19  1426   PH 7.31*   PO2 60.8*   PCO2 46.7*   HCO3 22.80   BE -3.60*       Physical Exam:  Physical Exam   Constitutional: He is oriented to person, place, and time and well-developed, well-nourished, and in no distress. No distress.   HENT:   Head: Normocephalic and atraumatic.   Eyes: Pupils are equal, round, and reactive to light. Conjunctivae and EOM are normal.   Neck: Normal range of motion. Neck supple. No JVD present. No tracheal deviation present. No thyromegaly present.   Cardiovascular: Normal rate, regular rhythm and normal heart sounds. Exam reveals no gallop and no friction rub.   Pulmonary/Chest: Effort normal. No stridor. No respiratory distress. He has wheezes. He has no rales.   Decreased at bases posteriorly  Few expiratory wheezes   Abdominal: Soft. Bowel sounds are normal. He exhibits no distension. There is no tenderness. There is no rebound and no guarding.   Musculoskeletal: Normal range of motion. He exhibits no edema.   Neurological: He is alert and oriented to person, place, and time.   Skin: He is not diaphoretic.   Vitals reviewed.      Laboratory/Diagnostic Data:    Recent Labs   Lab 04/07/19  0337   WBC 9.10   HGB 9.6*   HCT 30.5*      *   K 3.2*      CO2 31*   BUN 65*   CREATININE 2.4*       Microbiology    Microbiology Results (last 7 days)     Procedure Component Value Units Date/Time    Blood culture [[id number]] Collected:  04/03/19 0343    Order Status:  Completed Specimen:  Blood Updated:  04/07/19 1212     Blood Culture, Routine No Growth to date     Blood Culture, Routine No Growth to date     Blood Culture, Routine No Growth to date     Blood Culture, Routine No Growth to date     Blood Culture, Routine No Growth to date    Culture, Respiratory with Gram Stain [[id number]] Collected:  04/04/19 1333    Order Status:  Completed Specimen:  Respiratory from Sputum, Expectorated Updated:  04/06/19 0736     Respiratory Culture  Normal respiratory lester     Gram Stain (Respiratory) <10 epithelial cells per low power field.     Gram Stain (Respiratory) Rare WBC's     Gram Stain (Respiratory) Moderate Gram positive cocci     Gram Stain (Respiratory) Few yeast          Radiology    EXAMINATION:  XR CHEST 1 VIEW    CLINICAL HISTORY:  pulmonary edema;    TECHNIQUE:  Single frontal view of the chest was performed.    COMPARISON:  Chest portable of 5 April 2019.    FINDINGS:  Calcification is noted in the aorta.  The heart size is borderline.  Bilateral perihilar lower lobe infiltrates are unchanged.  Bilateral pleural effusions are suspected.  No pneumothorax is seen.      Impression       Continued bilateral perihilar and lower lobe infiltrates.  Borderline heart size.  Atherosclerosis.      Electronically signed by: Dixon Rodriguez MD  Date: 04/06/2019  Time: 08:33     (probable bilteral effusion/atelectasis)    Medications:     atorvastatin  20 mg Oral Daily    budesonide  0.5 mg Nebulization Q12H    carvedilol  6.25 mg Oral BID    cefTRIAXone (ROCEPHIN) IVPB  2 g Intravenous Q24H    clopidogrel  75 mg Oral Daily    doxycycline (VIBRAMYCIN) IVPB  100 mg Intravenous Q12H    enoxaparin  40 mg Subcutaneous Daily    escitalopram oxalate  10 mg Oral Daily    ferrous sulfate  325 mg Oral TID    folic acid  1 mg Oral Daily    hydrALAZINE  25 mg Oral Q8H    ipratropium  0.5 mg Nebulization Q6H    levalbuterol  1.25 mg Nebulization Q12H    oxybutynin  5 mg Oral Daily    pantoprazole  40 mg Oral Daily    traZODone  50 mg Oral QHS        furosemide (LASIX) 2 mg/mL infusion (titrating) 20 mg/hr (04/07/19 0140)       acetaminophen, albuterol-ipratropium, famotidine, influenza, ondansetron, pneumoc 13-danie conj-dip cr(PF), ramelteon, senna-docusate 8.6-50 mg, sodium chloride 0.9%    ASSESSMENT/PLAN:     Active Problems:    Active Hospital Problems    Diagnosis  POA    *Acute on chronic respiratory failure with hypoxia [J96.21]  Yes     Aspiration into airway [T17.908A]  No    CAD (coronary artery disease) [I25.10]  Yes    Elevated d-dimer [R79.89]  Yes    COPD exacerbation [J44.1]  Yes    Calcification of aorta [I70.0]  Yes    Acute pulmonary edema [J81.0]  Yes    Acute diastolic congestive heart failure [I50.31]  Yes    Essential hypertension [I10]  Yes    COSTA (acute kidney injury) [N17.9]  Yes      Resolved Hospital Problems   No resolved problems to display.     Respiratory failure, hypoxemic, acute and chronic  - wean O2 as able  - use BiPAP as needed  - better, increase activities    Abnormal CXR  - looks like bilateral atelectasis and effusions  - follow    COPD  - probable  - no acute exacerbation    Pulmonary Hypertension  - aware  - no right heart strain    CHF, diastolic,acute on chronic - decompensated  - on lasix drip, better    COSTA  - better, renal following    Elevated d-dimer  - aware  - clinical story not c/w PE  - dopplers of legs of legs negative    ASCVD  - aware    HTN  - aware, still not well controlled    NSVT  - aware      I will continue to follow with the pt.  Please call me at [phone] if you have any questions.      Santiago Dumont MD

## 2019-04-07 NOTE — PLAN OF CARE
04/06/19 1914   Patient Assessment/Suction   Level of Consciousness (AVPU) alert   Respiratory Effort Unlabored   All Lung Fields Breath Sounds diminished   PRE-TX-O2   O2 Device (Oxygen Therapy) nasal cannula w/ humidification   $ Is the patient on Low Flow Oxygen? Yes   Flow (L/min) 5   SpO2 97 %   Pulse Oximetry Type Continuous   $ Pulse Oximetry - Multiple Charge Pulse Oximetry - Multiple   Pulse 85   Resp (!) 21   Aerosol Therapy   $ Aerosol Therapy Charges Aerosol Treatment   Respiratory Treatment Status (SVN) given   Treatment Route (SVN) mask   Patient Position (SVN) HOB elevated   Post Treatment Assessment (SVN) breath sounds unchanged   Breath Sounds Post-Respiratory Treatment   Post-treatment Heart Rate (beats/min) 80   Post-treatment Resp Rate (breaths/min) 19   Preset CPAP/BiPAP Settings   Mode Of Delivery Standby   pt stated his breathing feels ok and no bipap is needed at this time.

## 2019-04-07 NOTE — PROGRESS NOTES
Progress Note  Hospital Medicine  Patient Name:Duc Spears  MRN:  [id number]  Patient Class: IP- Inpatient  Admit Date: 4/3/2019  Length of Stay: 4 days  Expected Discharge Date:   Attending Physician: Mimi Landa MD  Primary Care Provider:  Primary Doctor No    SUBJECTIVE:     Principal Problem: Acute on chronic respiratory failure with hypoxia  Initial history of present illness: Mr. Spears is a 75yo M with a PMH of HTN, Chronic Respiratory failure O2 Dependent, and CAD. He presented to [facility] with c/o Dyspnea. It started 2 days ago and got progressively worst. On arrival to [facility], he was in respiratory distress and hypoxic. He was found to be in acute CHF and given nebulizers, high flow 02, and a total of 80mg IV Lasix. He was also found to have COSTA. He was transferred to [facility] [place] for Nephrology services. On arrival to ICU, he was in respiratory distress and hypoxic. A 1L bag of IV NS was in progress on his arrival, and about 500cc was left in the bag. The infusion was stopped. Bipap therapy was initiated and he was given another 40mg IV lasix for pulmonary congestion. eICU physician ordered nebulizers. A repeat CXR was done and was essentially unchanged from the one earlier done at [facility]. After initiateion of Bipap and a nebulizer treatment, he reports that he is starting to breath better. On Bipap, his SPO2 improved to 90%, up from 70's.     PMH/PSH/SH/FH/Meds: reviewed.    Symptoms/Review of Systems:  Patient seen and examined. Remains stable on lasix gtt. No acute events overnight and feeling much better.   Diet:  Adequate intake.    Activity level:  Up with assist   Pain:  Patient reports no pain.       OBJECTIVE:   Vital Signs (Most Recent):      Temp: 98.3 °F (36.8 °C) (04/07/19 0315)  Pulse: 84 (04/07/19 0730)  Resp: 17 (04/07/19 0730)  BP: 114/65 (04/07/19 0600)  SpO2: 96 % (04/07/19 0730)       Vital Signs Range (Last 24H):  Temp:  [97.8 °F (36.6 °C)-98.3 °F (36.8 °C)]   Pulse:  []    Resp:  [13-59]   BP: ()/()   SpO2:  [93 %-100 %]     Weight: 80.7 kg (177 lb 14.6 oz)  Body mass index is 27.05 kg/m².    Intake/Output Summary (Last 24 hours) at 4/7/2019 0732  Last data filed at 4/7/2019 0625  Gross per 24 hour   Intake 751 ml   Output 3015 ml   Net -2264 ml     Physical Examination:  Constitutional: He is oriented to person, place, and time.   HENT:   Head: Normocephalic.   Eyes: Pupils are equal, round, and reactive to light.   Neck: Normal range of motion. Neck supple. No JVD present. No tracheal deviation present.   Cardiovascular: Normal rate, regular rhythm, normal heart sounds and intact distal pulses.   Pulmonary/Chest:Diffuse rhonchi bilaterally   Abdominal: Soft. Bowel sounds are normal. He exhibits no distension. There is no tenderness.   Musculoskeletal: Normal range of motion. He exhibits no edema.   Neurological: He is alert and oriented to person, place, and time. No cranial nerve deficit.   Skin: Skin is warm, dry and intact. Capillary refill takes less than 2 seconds.   CRANIAL NERVES   CN III, IV, VI   Pupils are equal, round, and reactive to light.    CBC:  Recent Labs   Lab 04/05/19 0316 04/06/19  0404 04/07/19  0337   WBC 9.10 9.10 9.10   RBC 3.45* 4.09* 3.78*   HGB 8.8* 10.3* 9.6*   HCT 28.0* 33.3* 30.5*    177 170   MCV 81* 81* 81*   MCH 25.4* 25.2* 25.5*   MCHC 31.2* 31.0* 31.5*   BMP  Recent Labs   Lab 04/03/19  0310 04/05/19  0316 04/06/19  0404 04/07/19  0337      < > 108 126* 120*      < > 145 144 146*   K 4.1   < > 3.7 3.4* 3.2*      < > 107 103 103   CO2 23   < > 27 29 31*   BUN 81*   < > 82* 74* 65*   CREATININE 3.9*   < > 3.0* 2.5* 2.4*   CALCIUM 9.4   < > 9.0 9.5 9.3   MG 2.5  --   --   --  1.8    < > = values in this interval not displayed.      Diagnostic Results:  Microbiology Results (last 7 days)     Procedure Component Value Units Date/Time    Blood culture [[id number]] Collected:  04/03/19 0343    Order Status:   Completed Specimen:  Blood Updated:  04/06/19 1212     Blood Culture, Routine No Growth to date     Blood Culture, Routine No Growth to date     Blood Culture, Routine No Growth to date     Blood Culture, Routine No Growth to date    Culture, Respiratory with Gram Stain [[id number]] Collected:  04/04/19 1333    Order Status:  Completed Specimen:  Respiratory from Sputum, Expectorated Updated:  04/06/19 0736     Respiratory Culture Normal respiratory lester     Gram Stain (Respiratory) <10 epithelial cells per low power field.     Gram Stain (Respiratory) Rare WBC's     Gram Stain (Respiratory) Moderate Gram positive cocci     Gram Stain (Respiratory) Few yeast         Renal US:  Features of medical renal disease.  No hydronephrosis. Cholelithiasis.    CXR:  1. Bilateral pleural effusions between small to moderate in size.  2. Bilateral airspace disease which could reflect atelectasis, pneumonia, pulmonary edema, acute lung injury/ARDS.  3. Cardiomegaly.    ECHO:  · Concentric left ventricular hypertrophy.  · Normal left ventricular systolic function. The estimated ejection fraction is 58%  · Grade III (severe) left ventricular diastolic dysfunction consistent with restrictive physiology.  · Elevated left atrial pressure.  · Low normal right ventricular systolic function.  · Moderate left atrial enlargement.  · Mild right atrial enlargement.  · Mild mitral regurgitation.  · Mild tricuspid regurgitation.  · Elevated central venous pressure (15 mm Hg).  · The estimated PA systolic pressure is 43 mm Hg  · Pulmonary hypertension present.  · There is a moderate left pleural effusion.  · Pericardial effusion.    CXR: Bilateral airspace disease and pleural effusions, similar in extent.      Assessment/Plan:   * Acute on chronic respiratory failure with hypoxia  Continue supplemental oxygen to maintain pulse ox above 92%.  Use BiPAP noninvasive ventilatory therapy as needed.  Swallowing evaluation for possible aspiration.  On  IV Rocephin and doxycycline.  Follow pulmonary recommendations.  Continue beta 2 agonist bronchodilator nebulization treatments.  Continue close monitoring in intensive care unit.     Acute on chronic diastolic congestive heart failure exacerbation  Follow Cardiology recommendations.  Echo results reviewed.  Continue intravenous IV Lasix infusion. Consider transition to scheduled IV dosing tomorrow  Hold chronic ARB due to COSTA.  Continue Coreg - titrate as needed  Monitor on telemetry.     COSTA (acute kidney injury) - improving  Hold ARB, and Sulindac for now.  Follow Nephrology recommendation.  Renal ultrasound results reviewed.  Strict I&O  Avoid NSAIDs/Nephrotoxins  Renal dose medications     Elevated d-dimer  Unable to do Chest CTA due to COSTA  Lower extremity US negative  Will transition to prophylaxis dosing as likelihood of PE low.    CAD (coronary artery disease)  Continue statin and plavix  Not on chronic ASA     Essential hypertension  Chronic/Controlled. Continue chronic medications, adjusting as needed.    Nonsustained ventricular tachycardia, asymptomatic  Telemonitoring.  Check Mag level.  Continue serial cardiac enzymes.  Follow Cardiology recommendation    Physical deconditioning  PT/OT.     Critical Care  - THE PATIENT HAS A HIGH PROBABILITY OF IMMINENT OR LIFE THREATENING DETERIORATION.  Over 50%time of encounter was in direct care at bedside.  Time was 35 minutes required for patient care.     VTE Risk Mitigation (From admission, onward)        Ordered     enoxaparin injection 90 mg  Every 24 hours (non-standard times)      04/05/19 1043     IP VTE HIGH RISK PATIENT  Once      04/03/19 0252     Place TIANA hose  Until discontinued      04/03/19 0252        Duc Juarez MD  Department of Hospital Medicine   [facility]

## 2019-04-07 NOTE — PROGRESS NOTES
Progress Note  Nephrology    Admit Date: 4/3/2019   LOS: 4 days     SUBJECTIVE:     CC:  COSTA    Past medical, surgical and social history reviewed    HPI: 75M with COPD on 2L home oxygen, CAD (MI ), CHF, HTN, smoker for many years (quit 2 weeks) presented with SOB, wheezing for 2 weeks, with acute worsening 2 days ago. Denies any fever. Renal consulted for COSTA.      VSS, no new complains, good UO.  / VSS, no new complains.    HGb 10, BUN/Scr continue to improve.  Pt in chair, denies chest pain or SOB.  Does c/o orthopnea.  No N/V, feels better.  Hypokalemic, on lasix gtt.  Will replete K+.    BUN/Scr a little lower today.  UOP 3L on lasix gtt.  Feels better, no chest pain.      Assessment/plan:    COSTA due to infection, CHF, obstruction, volume depletion, meds  CKD stage 3-4?  Hematuria, proteinuria of unclear significance--has uti.  F/u after infection cleared  CHF exacerbation  COPD exacerbation  No NSAIDs, ACEI/ARB, IV contrast or other nephrotoxins.  Keep MAP > 60, SBP > 100.  Dose meds for GFR < 30 ml/min.  Replete k per protocol  Treat any infection.  Cards and pulm consult appreciated.     Anemia of CKD  Stable. Monitor.  No need for DIANA.  No need for IV iron.     HTN  Controlled.   Tolerate asymptomatic HTN up to -160.  Continue home meds.  Low sodium diet.    Review of Systems:  Negative except for above.    OBJECTIVE:     Vital Signs (Most Recent)  Temp: 98.3 °F (36.8 °C) (19 0315)  Pulse: 84 (19)  Resp: 17 (19)  BP: 114/65 (19 0600)  SpO2: 96 % (19)    Vital Signs Range (Last 24H):  Temp:  [97.8 °F (36.6 °C)-98.3 °F (36.8 °C)]   Pulse:  []   Resp:  [13-59]   BP: ()/()   SpO2:  [93 %-100 %]     Temp (24hrs), Av °F (36.7 °C), Min:97.8 °F (36.6 °C), Max:98.3 °F (36.8 °C)    Systolic (24hrs), Av , Min:93 , Max:220     Diastolic (24hrs), Av, Min:49, Max:171      I & O (Last 24H):    Intake/Output Summary (Last 24  hours) at 4/7/2019 0825  Last data filed at 4/7/2019 0625  Gross per 24 hour   Intake 751 ml   Output 2665 ml   Net -1914 ml     Physical Exam:  General appearance: well developed, well nourished  Eyes:  conjunctivae/corneas clear. PERRL.  Neck: supple, symmetrical, trachea midline, no JVD  Lungs:  diminished bases  Heart: regular rate and rhythm, S1, S2 normal, no murmur, click, rub or gallop  Abdomen: soft, non-tender non-distented; bowel sounds normal; no masses,  no organomegaly  Extremities: no cyanosis or edema, or clubbing  Skin: w/d  Neurologic: Mental status: Alert, oriented, thought content appropriate    Laboratory:  CBC:  Recent Labs   Lab 04/07/19 0337   WBC 9.10   RBC 3.78*   HGB 9.6*   HCT 30.5*      MCV 81*   MCH 25.5*   MCHC 31.5*     BMP:  Recent Labs   Lab 04/07/19 0337   *   K 3.2*      CO2 31*   BUN 65*   CREATININE 2.4*   CALCIUM 9.3      CMP:   Recent Labs   Lab 04/07/19 0337   *   CALCIUM 9.3   *   K 3.2*   CO2 31*      BUN 65*   CREATININE 2.4*     All other lab data reviewed and negative unless otherwise specified   Diagnostic Results:  Labs: Reviewed  X-Ray: Reviewed    ASSESSMENT/PLAN:     Active Hospital Problems    Diagnosis  POA    *Acute on chronic respiratory failure with hypoxia [J96.21]  Yes    Aspiration into airway [T17.908A]  No    CAD (coronary artery disease) [I25.10]  Yes    Elevated d-dimer [R79.89]  Yes    COPD exacerbation [J44.1]  Yes    Calcification of aorta [I70.0]  Yes    Acute pulmonary edema [J81.0]  Yes    Acute diastolic congestive heart failure [I50.31]  Yes    Essential hypertension [I10]  Yes    COSTA (acute kidney injury) [N17.9]  Yes      Resolved Hospital Problems   No resolved problems to display.       Darek Sethi MD  Nephrology  [facility]  [phone]

## 2019-04-07 NOTE — PROGRESS NOTES
Progress Note  Cardiology    Admit Date: 4/3/2019   LOS: 4 days     Follow-up For:  cardiology    Scheduled Meds:   atorvastatin  20 mg Oral Daily    budesonide  0.5 mg Nebulization Q12H    carvedilol  6.25 mg Oral BID    cefTRIAXone (ROCEPHIN) IVPB  2 g Intravenous Q24H    clopidogrel  75 mg Oral Daily    doxycycline (VIBRAMYCIN) IVPB  100 mg Intravenous Q12H    enoxaparin  40 mg Subcutaneous Daily    escitalopram oxalate  10 mg Oral Daily    ferrous sulfate  325 mg Oral TID    folic acid  1 mg Oral Daily    hydrALAZINE  50 mg Oral Q8H    ipratropium  0.5 mg Nebulization Q6H    levalbuterol  1.25 mg Nebulization Q12H    oxybutynin  5 mg Oral Daily    pantoprazole  40 mg Oral Daily    traZODone  50 mg Oral QHS     Continuous Infusions:   furosemide (LASIX) 2 mg/mL infusion (titrating) 20 mg/hr (04/07/19 0140)     PRN Meds:acetaminophen, albuterol-ipratropium, famotidine, influenza, ondansetron, pneumoc 13-danie conj-dip cr(PF), ramelteon, senna-docusate 8.6-50 mg, sodium chloride 0.9%    Review of patient's allergies indicates:   Allergen Reactions    Codeine Nausea And Vomiting       SUBJECTIVE:     Interval History: Patient has no complaints.      OBJECTIVE:     Vital Signs (Most Recent)  Temp: 96.5 °F (35.8 °C) (04/07/19 1722)  Pulse: 92 (04/07/19 1722)  Resp: 18 (04/07/19 1722)  BP: (!) 185/90 (04/07/19 1722)  SpO2: (!) 92 % (04/07/19 1722)    Vital Signs Range (Last 24H):  Temp:  [96.5 °F (35.8 °C)-98.3 °F (36.8 °C)]   Pulse:  []   Resp:  [13-36]   BP: ()/()   SpO2:  [92 %-100 %]       Physical Exam:  Neck: no carotid bruit and no JVD  Lungs: diminished breath sounds bilaterally  Heart: regular rate and rhythm  Extremities: Negative for: edema    Recent Results (from the past 24 hour(s))   CBC auto differential    Collection Time: 04/07/19  3:37 AM   Result Value Ref Range    WBC 9.10 3.90 - 12.70 K/uL    RBC 3.78 (L) 4.60 - 6.20 M/uL    Hemoglobin 9.6 (L) 14.0 - 18.0 g/dL     Hematocrit 30.5 (L) 40.0 - 54.0 %    MCV 81 (L) 82 - 98 fL    MCH 25.5 (L) 27.0 - 31.0 pg    MCHC 31.5 (L) 32.0 - 36.0 g/dL    RDW 19.1 (H) 11.5 - 14.5 %    Platelets 170 150 - 350 K/uL    MPV 8.6 (L) 9.2 - 12.9 fL    Gran # (ANC) 7.2 1.8 - 7.7 K/uL    Lymph # 1.1 1.0 - 4.8 K/uL    Mono # 0.8 0.3 - 1.0 K/uL    Eos # 0.1 0.0 - 0.5 K/uL    Baso # 0.00 0.00 - 0.20 K/uL    Gran% 78.3 (H) 38.0 - 73.0 %    Lymph% 11.9 (L) 18.0 - 48.0 %    Mono% 8.6 4.0 - 15.0 %    Eosinophil% 1.0 0.0 - 8.0 %    Basophil% 0.2 0.0 - 1.9 %    Differential Method Automated    Phosphorus - if not done in ED    Collection Time: 04/07/19  3:37 AM   Result Value Ref Range    Phosphorus 3.8 2.7 - 4.5 mg/dL   Basic metabolic panel     Collection Time: 04/07/19  3:37 AM   Result Value Ref Range    Sodium 146 (H) 136 - 145 mmol/L    Potassium 3.2 (L) 3.5 - 5.1 mmol/L    Chloride 103 95 - 110 mmol/L    CO2 31 (H) 23 - 29 mmol/L    Glucose 120 (H) 70 - 110 mg/dL    BUN, Bld 65 (H) 8 - 23 mg/dL    Creatinine 2.4 (H) 0.5 - 1.4 mg/dL    Calcium 9.3 8.7 - 10.5 mg/dL    Anion Gap 12 8 - 16 mmol/L    eGFR if African American 30 (A) >60 mL/min/1.73 m^2    eGFR if non African American 26 (A) >60 mL/min/1.73 m^2   C4 complement    Collection Time: 04/07/19  3:37 AM   Result Value Ref Range    Complement (C-4) 26 11 - 44 mg/dL   C3 complement    Collection Time: 04/07/19  3:37 AM   Result Value Ref Range    Complement (C-3) 69 50 - 180 mg/dL   Protein electrophoresis, serum    Collection Time: 04/07/19  3:37 AM   Result Value Ref Range    Protein, Serum 6.1 6.0 - 8.4 g/dL   Magnesium    Collection Time: 04/07/19  3:37 AM   Result Value Ref Range    Magnesium 1.8 1.6 - 2.6 mg/dL       Diagnostic Results:  Labs: Reviewed    ASSESSMENT/PLAN:     High BP  Increase Hydralazine 50 mg every 8 hours.PO.    Plan: Continue Current.

## 2019-04-07 NOTE — PLAN OF CARE
Problem: Adult Inpatient Plan of Care  Goal: Plan of Care Review  Outcome: Ongoing (interventions implemented as appropriate)  Patient receiving aerosol tx via 1.25mg xopenex now and q12, 0.5mg atrovent now and q6 and 0.5mg pulmicort now and q12hr.  Hr 86 and 02 saturation 96% on nc at 4lpm.  Decreased to 4lpm.

## 2019-04-08 LAB
ALBUMIN SERPL ELPH-MCNC: 2.51 G/DL (ref 3.35–5.55)
ALPHA1 GLOB SERPL ELPH-MCNC: 0.38 G/DL (ref 0.17–0.41)
ALPHA2 GLOB SERPL ELPH-MCNC: 0.96 G/DL (ref 0.43–0.99)
ANA SER QL IF: POSITIVE
ANA TITR SER IF: NORMAL {TITER}
ANION GAP SERPL CALC-SCNC: 10 MMOL/L (ref 8–16)
B-GLOBULIN SERPL ELPH-MCNC: 0.81 G/DL (ref 0.5–1.1)
BACTERIA BLD CULT: NORMAL
BACTERIA SPEC AEROBE CULT: NORMAL
BACTERIA SPEC AEROBE CULT: NORMAL
BASOPHILS # BLD AUTO: 0 K/UL (ref 0–0.2)
BASOPHILS NFR BLD: 0.3 % (ref 0–1.9)
BUN SERPL-MCNC: 55 MG/DL (ref 8–23)
CALCIUM SERPL-MCNC: 9.3 MG/DL (ref 8.7–10.5)
CHLORIDE SERPL-SCNC: 100 MMOL/L (ref 95–110)
CO2 SERPL-SCNC: 33 MMOL/L (ref 23–29)
CREAT SERPL-MCNC: 2.5 MG/DL (ref 0.5–1.4)
DIFFERENTIAL METHOD: ABNORMAL
EOSINOPHIL # BLD AUTO: 0.1 K/UL (ref 0–0.5)
EOSINOPHIL NFR BLD: 1.8 % (ref 0–8)
ERYTHROCYTE [DISTWIDTH] IN BLOOD BY AUTOMATED COUNT: 19.4 % (ref 11.5–14.5)
EST. GFR  (AFRICAN AMERICAN): 29 ML/MIN/1.73 M^2
EST. GFR  (NON AFRICAN AMERICAN): 25 ML/MIN/1.73 M^2
FERRITIN SERPL-MCNC: 62 NG/ML (ref 20–300)
GAMMA GLOB SERPL ELPH-MCNC: 1.45 G/DL (ref 0.67–1.58)
GLUCOSE SERPL-MCNC: 106 MG/DL (ref 70–110)
GRAM STN SPEC: NORMAL
HCT VFR BLD AUTO: 28.8 % (ref 40–54)
HGB BLD-MCNC: 9.2 G/DL (ref 14–18)
LYMPHOCYTES # BLD AUTO: 1.2 K/UL (ref 1–4.8)
LYMPHOCYTES NFR BLD: 15.1 % (ref 18–48)
MCH RBC QN AUTO: 26 PG (ref 27–31)
MCHC RBC AUTO-ENTMCNC: 32 G/DL (ref 32–36)
MCV RBC AUTO: 81 FL (ref 82–98)
MONOCYTES # BLD AUTO: 0.8 K/UL (ref 0.3–1)
MONOCYTES NFR BLD: 9.5 % (ref 4–15)
NEUTROPHILS # BLD AUTO: 5.9 K/UL (ref 1.8–7.7)
NEUTROPHILS NFR BLD: 73.3 % (ref 38–73)
PATHOLOGIST INTERPRETATION SPE: NORMAL
PATHOLOGIST INTERPRETATION UPE: NORMAL
PHOSPHATE SERPL-MCNC: 5 MG/DL (ref 2.7–4.5)
PLATELET # BLD AUTO: 148 K/UL (ref 150–350)
PMV BLD AUTO: 9.4 FL (ref 9.2–12.9)
POTASSIUM SERPL-SCNC: 3.2 MMOL/L (ref 3.5–5.1)
PROT PATTERN UR ELPH-IMP: NORMAL
PROT SERPL-MCNC: 6.1 G/DL (ref 6–8.4)
PTH-INTACT SERPL-MCNC: 113.1 PG/ML (ref 9–77)
RBC # BLD AUTO: 3.55 M/UL (ref 4.6–6.2)
SODIUM SERPL-SCNC: 143 MMOL/L (ref 136–145)
WBC # BLD AUTO: 8.1 K/UL (ref 3.9–12.7)

## 2019-04-08 PROCEDURE — 97165 OT EVAL LOW COMPLEX 30 MIN: CPT

## 2019-04-08 PROCEDURE — 25000003 PHARM REV CODE 250: Performed by: INTERNAL MEDICINE

## 2019-04-08 PROCEDURE — 92526 ORAL FUNCTION THERAPY: CPT

## 2019-04-08 PROCEDURE — 25000003 PHARM REV CODE 250: Performed by: SPECIALIST

## 2019-04-08 PROCEDURE — 99232 SBSQ HOSP IP/OBS MODERATE 35: CPT | Mod: ,,, | Performed by: INTERNAL MEDICINE

## 2019-04-08 PROCEDURE — G8988 SELF CARE GOAL STATUS: HCPCS | Mod: CI

## 2019-04-08 PROCEDURE — 63600175 PHARM REV CODE 636 W HCPCS: Performed by: HOSPITALIST

## 2019-04-08 PROCEDURE — 97535 SELF CARE MNGMENT TRAINING: CPT

## 2019-04-08 PROCEDURE — 80048 BASIC METABOLIC PNL TOTAL CA: CPT

## 2019-04-08 PROCEDURE — 25000242 PHARM REV CODE 250 ALT 637 W/ HCPCS: Performed by: HOSPITALIST

## 2019-04-08 PROCEDURE — 25000242 PHARM REV CODE 250 ALT 637 W/ HCPCS: Performed by: INTERNAL MEDICINE

## 2019-04-08 PROCEDURE — 82728 ASSAY OF FERRITIN: CPT

## 2019-04-08 PROCEDURE — 84100 ASSAY OF PHOSPHORUS: CPT

## 2019-04-08 PROCEDURE — 36415 COLL VENOUS BLD VENIPUNCTURE: CPT

## 2019-04-08 PROCEDURE — 63600175 PHARM REV CODE 636 W HCPCS: Performed by: INTERNAL MEDICINE

## 2019-04-08 PROCEDURE — 25000003 PHARM REV CODE 250: Performed by: NURSE PRACTITIONER

## 2019-04-08 PROCEDURE — G8987 SELF CARE CURRENT STATUS: HCPCS | Mod: CI

## 2019-04-08 PROCEDURE — 94640 AIRWAY INHALATION TREATMENT: CPT

## 2019-04-08 PROCEDURE — 97116 GAIT TRAINING THERAPY: CPT

## 2019-04-08 PROCEDURE — G8989 SELF CARE D/C STATUS: HCPCS | Mod: CI

## 2019-04-08 PROCEDURE — 11000001 HC ACUTE MED/SURG PRIVATE ROOM

## 2019-04-08 PROCEDURE — 25000003 PHARM REV CODE 250: Performed by: HOSPITALIST

## 2019-04-08 PROCEDURE — 83970 ASSAY OF PARATHORMONE: CPT

## 2019-04-08 PROCEDURE — 27000221 HC OXYGEN, UP TO 24 HOURS

## 2019-04-08 PROCEDURE — 85025 COMPLETE CBC W/AUTO DIFF WBC: CPT

## 2019-04-08 PROCEDURE — 99232 PR SUBSEQUENT HOSPITAL CARE,LEVL II: ICD-10-PCS | Mod: ,,, | Performed by: INTERNAL MEDICINE

## 2019-04-08 PROCEDURE — 94761 N-INVAS EAR/PLS OXIMETRY MLT: CPT

## 2019-04-08 RX ORDER — HYDRALAZINE HYDROCHLORIDE 25 MG/1
100 TABLET, FILM COATED ORAL EVERY 8 HOURS
Status: DISCONTINUED | OUTPATIENT
Start: 2019-04-08 | End: 2019-04-12 | Stop reason: HOSPADM

## 2019-04-08 RX ORDER — METOLAZONE 5 MG/1
5 TABLET ORAL ONCE
Status: COMPLETED | OUTPATIENT
Start: 2019-04-08 | End: 2019-04-08

## 2019-04-08 RX ORDER — ENOXAPARIN SODIUM 100 MG/ML
30 INJECTION SUBCUTANEOUS EVERY 24 HOURS
Status: DISCONTINUED | OUTPATIENT
Start: 2019-04-08 | End: 2019-04-12 | Stop reason: HOSPADM

## 2019-04-08 RX ORDER — HYDRALAZINE HYDROCHLORIDE 20 MG/ML
10 INJECTION INTRAMUSCULAR; INTRAVENOUS
Status: DISCONTINUED | OUTPATIENT
Start: 2019-04-08 | End: 2019-04-12 | Stop reason: HOSPADM

## 2019-04-08 RX ORDER — FAMOTIDINE 20 MG/1
20 TABLET, FILM COATED ORAL DAILY PRN
Status: DISCONTINUED | OUTPATIENT
Start: 2019-04-08 | End: 2019-04-12 | Stop reason: HOSPADM

## 2019-04-08 RX ORDER — POTASSIUM CHLORIDE 750 MG/1
40 TABLET, EXTENDED RELEASE ORAL ONCE
Status: COMPLETED | OUTPATIENT
Start: 2019-04-08 | End: 2019-04-08

## 2019-04-08 RX ADMIN — FERROUS SULFATE TAB EC 325 MG (65 MG FE EQUIVALENT) 325 MG: 325 (65 FE) TABLET DELAYED RESPONSE at 09:04

## 2019-04-08 RX ADMIN — LEVALBUTEROL 1.25 MG: 1.25 SOLUTION, CONCENTRATE RESPIRATORY (INHALATION) at 07:04

## 2019-04-08 RX ADMIN — CARVEDILOL 6.25 MG: 6.25 TABLET, FILM COATED ORAL at 09:04

## 2019-04-08 RX ADMIN — CLOPIDOGREL BISULFATE 75 MG: 75 TABLET, FILM COATED ORAL at 08:04

## 2019-04-08 RX ADMIN — ATORVASTATIN CALCIUM 20 MG: 20 TABLET, FILM COATED ORAL at 09:04

## 2019-04-08 RX ADMIN — DOXYCYCLINE 100 MG: 100 INJECTION, POWDER, LYOPHILIZED, FOR SOLUTION INTRAVENOUS at 04:04

## 2019-04-08 RX ADMIN — IPRATROPIUM BROMIDE 0.5 MG: 0.5 SOLUTION RESPIRATORY (INHALATION) at 07:04

## 2019-04-08 RX ADMIN — HYDRALAZINE HYDROCHLORIDE 100 MG: 25 TABLET, FILM COATED ORAL at 09:04

## 2019-04-08 RX ADMIN — BUDESONIDE 0.5 MG: 0.5 SUSPENSION RESPIRATORY (INHALATION) at 07:04

## 2019-04-08 RX ADMIN — FOLIC ACID 1 MG: 1 TABLET ORAL at 09:04

## 2019-04-08 RX ADMIN — PANTOPRAZOLE SODIUM 40 MG: 40 TABLET, DELAYED RELEASE ORAL at 08:04

## 2019-04-08 RX ADMIN — CEFTRIAXONE 2 G: 2 INJECTION, SOLUTION INTRAVENOUS at 03:04

## 2019-04-08 RX ADMIN — IPRATROPIUM BROMIDE 0.5 MG: 0.5 SOLUTION RESPIRATORY (INHALATION) at 12:04

## 2019-04-08 RX ADMIN — DOXYCYCLINE 100 MG: 100 INJECTION, POWDER, LYOPHILIZED, FOR SOLUTION INTRAVENOUS at 05:04

## 2019-04-08 RX ADMIN — HYDRALAZINE HYDROCHLORIDE 100 MG: 25 TABLET, FILM COATED ORAL at 02:04

## 2019-04-08 RX ADMIN — OXYBUTYNIN CHLORIDE 5 MG: 5 TABLET, EXTENDED RELEASE ORAL at 09:04

## 2019-04-08 RX ADMIN — HYDRALAZINE HYDROCHLORIDE 50 MG: 25 TABLET, FILM COATED ORAL at 05:04

## 2019-04-08 RX ADMIN — FERROUS SULFATE TAB EC 325 MG (65 MG FE EQUIVALENT) 325 MG: 325 (65 FE) TABLET DELAYED RESPONSE at 02:04

## 2019-04-08 RX ADMIN — ENOXAPARIN SODIUM 30 MG: 100 INJECTION SUBCUTANEOUS at 05:04

## 2019-04-08 RX ADMIN — METOLAZONE 5 MG: 5 TABLET ORAL at 09:04

## 2019-04-08 RX ADMIN — TRAZODONE HYDROCHLORIDE 50 MG: 50 TABLET ORAL at 09:04

## 2019-04-08 RX ADMIN — POTASSIUM CHLORIDE 40 MEQ: 750 TABLET, EXTENDED RELEASE ORAL at 02:04

## 2019-04-08 RX ADMIN — ESCITALOPRAM OXALATE 10 MG: 10 TABLET, FILM COATED ORAL at 09:04

## 2019-04-08 NOTE — PROGRESS NOTES
Progress Note  Hospital Medicine  Patient Name:Duc Spears  MRN:  [id number]  Patient Class: IP- Inpatient  Admit Date: 4/3/2019  Length of Stay: 5 days  Expected Discharge Date:   Attending Physician: Mimi Landa MD  Primary Care Provider:  Primary Doctor No    SUBJECTIVE:     Principal Problem: Acute on chronic respiratory failure with hypoxia  Initial history of present illness: Mr. Spears is a 75yo M with a PMH of HTN, Chronic Respiratory failure O2 Dependent, and CAD. He presented to [facility] with c/o Dyspnea. It started 2 days ago and got progressively worst. On arrival to [facility], he was in respiratory distress and hypoxic. He was found to be in acute CHF and given nebulizers, high flow 02, and a total of 80mg IV Lasix. He was also found to have COSTA. He was transferred to [facility] [place] for Nephrology services. On arrival to ICU, he was in respiratory distress and hypoxic. A 1L bag of IV NS was in progress on his arrival, and about 500cc was left in the bag. The infusion was stopped. Bipap therapy was initiated and he was given another 40mg IV lasix for pulmonary congestion. eICU physician ordered nebulizers. A repeat CXR was done and was essentially unchanged from the one earlier done at [facility]. After initiateion of Bipap and a nebulizer treatment, he reports that he is starting to breath better. On Bipap, his SPO2 improved to 90%, up from 70's.     PMH/PSH/SH/FH/Meds: reviewed.    Symptoms/Review of Systems:  Patient seen and examined. Remains stable on lasix gtt. BP is high.  Feeling better, sitting in chair.  No acute events overnight.  Diet:  Soft mechanical nectar thick  Activity level:  Up with assist   Pain:  Patient reports no pain.       OBJECTIVE:   Vital Signs (Most Recent):      Temp: 97.1 °F (36.2 °C) (04/08/19 0746)  Pulse: 83 (04/08/19 0746)  Resp: 18 (04/08/19 0746)  BP: (!) 201/90 (04/08/19 0746)  SpO2: (!) 94 % (04/08/19 0746)       Vital Signs Range (Last 24H):  Temp:  [96.5 °F (35.8  °C)-98.2 °F (36.8 °C)]   Pulse:  [9-94]   Resp:  [13-28]   BP: (145-213)/()   SpO2:  [88 %-98 %]     Weight: 81.6 kg (179 lb 14.4 oz)  Body mass index is 27.35 kg/m².    Intake/Output Summary (Last 24 hours) at 4/8/2019 1002  Last data filed at 4/8/2019 0500  Gross per 24 hour   Intake 1181.29 ml   Output 3200 ml   Net -2018.71 ml     Physical Examination:  Constitutional: He is oriented to person, place, and time.   HENT:   Head: Normocephalic.   Eyes: Pupils are equal, round, and reactive to light.   Neck: Normal range of motion. Neck supple. No JVD present. No tracheal deviation present.   Cardiovascular: Normal rate, regular rhythm, normal heart sounds and intact distal pulses.   Pulmonary/Chest:Diffuse rhonchi bilaterally   Abdominal: Soft. Bowel sounds are normal. He exhibits no distension. There is no tenderness.   Musculoskeletal: Normal range of motion. He exhibits no edema.   Neurological: He is alert and oriented to person, place, and time. No cranial nerve deficit.   Skin: Skin is warm, dry and intact. Capillary refill takes less than 2 seconds.   CRANIAL NERVES   CN III, IV, VI   Pupils are equal, round, and reactive to light.    CBC:  Recent Labs   Lab 04/06/19  0404 04/07/19  0337 04/08/19  0430   WBC 9.10 9.10 8.10   RBC 4.09* 3.78* 3.55*   HGB 10.3* 9.6* 9.2*   HCT 33.3* 30.5* 28.8*    170 148*   MCV 81* 81* 81*   MCH 25.2* 25.5* 26.0*   MCHC 31.0* 31.5* 32.0   BMP  Recent Labs   Lab 04/03/19  0310  04/06/19  0404 04/07/19  0337 04/08/19  0430      < > 126* 120* 106      < > 144 146* 143   K 4.1   < > 3.4* 3.2* 3.2*      < > 103 103 100   CO2 23   < > 29 31* 33*   BUN 81*   < > 74* 65* 55*   CREATININE 3.9*   < > 2.5* 2.4* 2.5*   CALCIUM 9.4   < > 9.5 9.3 9.3   MG 2.5  --   --  1.8  --     < > = values in this interval not displayed.      Diagnostic Results:  Microbiology Results (last 7 days)     Procedure Component Value Units Date/Time    Blood culture [[id number]]  Collected:  04/03/19 0343    Order Status:  Completed Specimen:  Blood Updated:  04/07/19 1212     Blood Culture, Routine No Growth to date     Blood Culture, Routine No Growth to date     Blood Culture, Routine No Growth to date     Blood Culture, Routine No Growth to date     Blood Culture, Routine No Growth to date    Culture, Respiratory with Gram Stain [[id number]] Collected:  04/04/19 1333    Order Status:  Completed Specimen:  Respiratory from Sputum, Expectorated Updated:  04/06/19 0736     Respiratory Culture Normal respiratory lester     Gram Stain (Respiratory) <10 epithelial cells per low power field.     Gram Stain (Respiratory) Rare WBC's     Gram Stain (Respiratory) Moderate Gram positive cocci     Gram Stain (Respiratory) Few yeast         Renal US:  Features of medical renal disease.  No hydronephrosis. Cholelithiasis.    CXR:  1. Bilateral pleural effusions between small to moderate in size.  2. Bilateral airspace disease which could reflect atelectasis, pneumonia, pulmonary edema, acute lung injury/ARDS.  3. Cardiomegaly.    ECHO:  · Concentric left ventricular hypertrophy.  · Normal left ventricular systolic function. The estimated ejection fraction is 58%  · Grade III (severe) left ventricular diastolic dysfunction consistent with restrictive physiology.  · Elevated left atrial pressure.  · Low normal right ventricular systolic function.  · Moderate left atrial enlargement.  · Mild right atrial enlargement.  · Mild mitral regurgitation.  · Mild tricuspid regurgitation.  · Elevated central venous pressure (15 mm Hg).  · The estimated PA systolic pressure is 43 mm Hg  · Pulmonary hypertension present.  · There is a moderate left pleural effusion.  · Pericardial effusion.    CXR: Bilateral airspace disease and pleural effusions, similar in extent.       MBSS: Moderate dysphagia.        CXR; Continued bilateral perihilar and lower lobe infiltrates.  Borderline heart size.  Atherosclerosis.        Bilateral leg venous doppler scan: No evidence of deep venous thrombosis in either lower extremity.       Assessment/Plan:   * Acute on chronic respiratory failure with hypoxia  Continue supplemental oxygen to maintain pulse ox above 92%.  Use BiPAP noninvasive ventilatory therapy as needed.  Swallowing evaluation for possible aspiration.  On IV Rocephin and doxycycline.  Follow pulmonary recommendations.  Continue beta 2 agonist bronchodilator nebulization treatments.  Continue close monitoring in intensive care unit.     Acute on chronic diastolic congestive heart failure exacerbation  Follow Cardiology recommendations.  Echo results reviewed.  Continue intravenous IV Lasix infusion. Consider transition to scheduled IV dosing tomorrow  Hold chronic ARB due to COSTA.  Continue Coreg - titrate as needed  Monitor on telemetry.     COSTA (acute kidney injury) - improving  Hold ARB, and Sulindac for now.  Follow Nephrology recommendation.  Renal ultrasound results reviewed.  Strict I&O  Avoid NSAIDs/Nephrotoxins  Renal dose medications     Elevated d-dimer  Unable to do Chest CTA due to COSTA  Lower extremity US negative  Will transition to prophylaxis dosing as likelihood of PE low.    CAD (coronary artery disease)  Continue statin and plavix  Not on chronic ASA     Essential hypertension  Chronic/Controlled. Continue chronic medications, adjusting as needed.    Nonsustained ventricular tachycardia, asymptomatic  Telemonitoring.  Check Mag level.  Continue serial cardiac enzymes.  Follow Cardiology recommendation    Physical deconditioning  PT/OT.       VTE Risk Mitigation (From admission, onward)        Ordered     enoxaparin injection 40 mg  Daily      04/07/19 0734     IP VTE HIGH RISK PATIENT  Once      04/03/19 0252     Place TIANA hose  Until discontinued      04/03/19 0252        Mimi Landa MD  Department of Hospital Medicine   [facility]

## 2019-04-08 NOTE — PROGRESS NOTES
Progress Note  PULMONARY    Admit Date: 4/3/2019   04/08/2019      SUBJECTIVE:     April 4th, alert, on niv and tolerating well.    April 5, choked on breakfast, less sob, off niv now.  April 8, now on floor, progressing - better- no c/o          PFSH and Allergies reviewed.    OBJECTIVE:     Vitals (Most recent):  Vitals:    04/08/19 1250   BP:    Pulse: 73   Resp: 16   Temp:        Vitals (24 hour range):  Temp:  [96.4 °F (35.8 °C)-98.1 °F (36.7 °C)]   Pulse:  [9-92]   Resp:  [16-20]   BP: (163-213)/(74-96)   SpO2:  [88 %-96 %]       Intake/Output Summary (Last 24 hours) at 4/8/2019 1706  Last data filed at 4/8/2019 0500  Gross per 24 hour   Intake 1181.29 ml   Output 3200 ml   Net -2018.71 ml          Physical Exam:  The patient's neuro status (alertness,orientation,cognitive function,motor skills,), pharyngeal exam (oral lesions, hygiene, abn dentition,), Neck (jvd,mass,thyroid,nodes in neck and above/below clavicle),RESPIRATORY(symmetry,effort,fremitus,percussion,auscultation),  Cor(rhythm,heart tones including gallops,perfusion,edema)ABD(distention,hepatic&splenomegaly,tenderness,masses), Skin(rash,cyanosis),Psyc(affect,judgement,).  Exam negative except for these pertinent findings:    Alert,nc, no distress, diffuse rales, no sign edema, symmetric, nl fremitus and percussion, distent cor    Radiographs reviewed: view by direct vision  4/5/19 cw pulm edeam and copd- not much changes- 4/6 stil dense lower lungs.      No results found for this or any previous visit.]    Labs     Recent Labs   Lab 04/08/19  0430   WBC 8.10   HGB 9.2*   HCT 28.8*   *     Recent Labs   Lab 04/08/19  0430      K 3.2*      CO2 33*   BUN 55*   CREATININE 2.5*      CALCIUM 9.3   PHOS 5.0*   No results for input(s): PH, PCO2, PO2, HCO3 in the last 24 hours.  Microbiology Results (last 7 days)     Procedure Component Value Units Date/Time    Blood culture [[id number]] Collected:  04/03/19 0343    Order Status:   Completed Specimen:  Blood Updated:  04/08/19 1212     Blood Culture, Routine No growth after 5 days.    Culture, Respiratory with Gram Stain [[id number]] Collected:  04/04/19 1333    Order Status:  Completed Specimen:  Respiratory from Sputum, Expectorated Updated:  04/08/19 1035     Respiratory Culture Normal respiratory lester     Respiratory Culture No S aureus or Pseudomonas isolated.     Gram Stain (Respiratory) <10 epithelial cells per low power field.     Gram Stain (Respiratory) Rare WBC's     Gram Stain (Respiratory) Moderate Gram positive cocci     Gram Stain (Respiratory) Few yeast          Impression:  Active Hospital Problems    Diagnosis  POA    *Acute on chronic respiratory failure with hypoxia [J96.21]  Yes    Aspiration into airway [T17.908A]  No    CAD (coronary artery disease) [I25.10]  Yes    Elevated d-dimer [R79.89]  Yes    COPD exacerbation [J44.1]  Yes    Calcification of aorta [I70.0]  Yes    Acute pulmonary edema [J81.0]  Yes    Acute diastolic congestive heart failure [I50.31]  Yes    Essential hypertension [I10]  Yes    COSTA (acute kidney injury) [N17.9]  Yes      Resolved Hospital Problems   No resolved problems to display.               Plan:   April 4 - only 800cc negative on lasix 80q8- still in pulm edema, echo diastolic chf.  Renal would seem biggest issue.  Lasix drip or dialysis.  resp compensated but marginal..    Will order lasix drip.  Likely needs dialysis acutely.  Renal is seeing.    April 5 , I/o -2200 on lasix drip 10/hr, aspirated some breakfast but breathing better.  Creat better on lasix drip- continue drip.  Speech eval  gpc in sputum, improved but slow.  Question if aspiration significant?  procal was 0.12 on 3rd.    April 8, lytes good but cxr 4/6 still bilat lower lung symmetric dz.     F/u cxr and consider ct if not clearing?    Need to get off lasix drip- will stop am if cxr cleared..                        .

## 2019-04-08 NOTE — PT/OT/SLP EVAL
Occupational Therapy   Evaluation and Discharge Note    Name: Duc Spears  MRN: [id number]  Admitting Diagnosis:  Acute on chronic respiratory failure with hypoxia      Recommendations:     Discharge Recommendations: home  Discharge Equipment Recommendations:  walker, rolling  Barriers to discharge:  None    Assessment:     Duc Spears is a 74 y.o. male with a medical diagnosis of Acute on chronic respiratory failure with hypoxia. At this time, patient requires supervision with function mobility, but requires no assistance with ADLs. No further acute OT services.     Plan:     During this hospitalization, patient does not require further acute OT services.  Please re-consult if situation changes.    · Plan of Care Reviewed with: patient    Subjective     Chief Complaint: none stated  Patient/Family Comments/goals: none stated    Occupational Profile:  Living Environment: Pt lives with wife in a H with 1 DEEPTHI.  Previous level of function: Pt was independent with ADLs and ambulatory without AD.   Equipment Used at home:  oxygen, cane, straight  Assistance upon Discharge: Pt will receive assistance from wife.     Pain/Comfort:  · Pain Rating 1: 0/10  · Pain Rating Post-Intervention 1: 0/10    Patients cultural, spiritual, [employer]ology conflicts given the current situation:      Objective:     Communicated with: nurse Freeman prior to session.  Patient found HOB elevated with peripheral IV, telemetry, bed alarm, castaneda catheter upon OT entry to room.    General Precautions: Standard, aspiration, nectar thick, respiratory, fall   Orthopedic Precautions:N/A   Braces: N/A     Occupational Performance:    Bed Mobility:    · Patient completed Scooting/Bridging with supervision  · Patient completed Supine to Sit with supervision  · Patient completed Sit to Supine with supervision    Functional Mobility/Transfers:  · Patient completed Sit <> Stand Transfer with supervision  with  rolling walker   · Patient  completed Bedside Commode Transfer via Stand Pivot technique with supervision with  rolling walker      Activities of Daily Living:  · Grooming: modified independence with oral hygiene while standing at sink.  · Lower Body Dressing: modified independence to don socks at EOB.    Cognitive/Visual Perceptual:  Cognitive/Psychosocial Skills:     -       Oriented to: x4   -       Follows Commands/attention:Follows multistep  commands  -       Communication: clear/fluent  -       Safety awareness/insight to disability: impaired   -       Mood/Affect/Coping skills/emotional control: Appropriate to situation  Visual/Perceptual:      -Intact     Physical Exam:  Postural examination/scapula alignment:    -       Rounded shoulders  -       Forward head  Upper Extremity Range of Motion:     -       Right Upper Extremity: WFL  -       Left Upper Extremity: WFL  Upper Extremity Strength:    -       Right Upper Extremity: 4/5  -       Left Upper Extremity: 4/5   Strength:    -       Right Upper Extremity: WNL  -       Left Upper Extremity: WNL  Fine Motor Coordination:    -       Intact  Gross motor coordination:   WFL    AMPAC 6 Click ADL:  AMPAC Total Score: 23    Treatment & Education:  OT ed patient on safety with walker use for functional mobility with cues for hand placement & sequencing.     Education:    Patient left standing with RW  with all lines intact and PT present    GOALS:   Multidisciplinary Problems     Occupational Therapy Goals     Not on file          Multidisciplinary Problems (Resolved)        Problem: Occupational Therapy Goal    Goal Priority Disciplines Outcome Interventions   Occupational Therapy Goal   (Resolved)     OT, PT/OT Outcome(s) achieved                    History:     Past Medical History:   Diagnosis Date    Cancer     skin cancer    Coronary artery disease     Hypertension        Past Surgical History:   Procedure Laterality Date    APPENDECTOMY      TONSILLECTOMY         Time  Tracking:     OT Date of Treatment: 04/08/19  OT Start Time: 0936  OT Stop Time: 1001  OT Total Time (min): 25 min    Billable Minutes:Evaluation 10  Self Care/Home Management 15    Patrick Salazar OT  4/8/2019

## 2019-04-08 NOTE — PLAN OF CARE
Problem: Physical Therapy Goal  Goal: Physical Therapy Goal  Goals to be met by: 2019     Patient will increase functional independence with mobility by performin. Supine to sit with Stand-by Assistance  2. Sit to stand transfer with Stand-by Assistance  3. Bed to chair transfer with Stand-by Assistance   4. Gait  x 250 feet with Contact Guard Assistance using Rolling Walker.   5. Lower extremity exercise program x20 reps    Outcome: Ongoing (interventions implemented as appropriate)  Pt seen for gait with RW/O2 at 4 liters 150ft. OOB to chair- encouraged active exercises

## 2019-04-08 NOTE — PLAN OF CARE
Problem: Occupational Therapy Goal  Goal: Occupational Therapy Goal  Outcome: Outcome(s) achieved Date Met: 04/08/19  Patient is supervision to mod I with ADLs. No further acute OT needs. Discharging from OT services.     Patrick Salazar OT  4/8/2019

## 2019-04-08 NOTE — PT/OT/SLP PROGRESS
Physical Therapy Treatment    Patient Name:  Duc Spears   MRN:  [id number]    Recommendations:     Discharge Recommendations:  home health PT   Discharge Equipment Recommendations: walker, rolling   Barriers to discharge: None    Assessment:     Duc Spears is a 74 y.o. male admitted with a medical diagnosis of Acute on chronic respiratory failure with hypoxia.  He presents with the following impairments/functional limitations:  weakness, impaired endurance, gait instability, impaired functional mobilty, impaired self care skills, impaired cardiopulmonary response to activity . Pt tolerated increased gait distance at hallways with RW. Fatigue with heaviness in breathing post PT.    Rehab Prognosis: Fair; patient would benefit from acute skilled PT services to address these deficits and reach maximum level of function.    Recent Surgery: Procedure(s) (LRB):  Bronchoscopy - need to cleanout, in icu best.  marginal pt (N/A)      Plan:     During this hospitalization, patient to be seen 6 x/week to address the identified rehab impairments via gait training, therapeutic activities, therapeutic exercises and progress toward the following goals:    · Plan of Care Expires:  04/26/19    Subjective   Stated jordi cold in bed, feeling better now  Chief Complaint: tired but felt good to move  Patient/Family Comments/goals: get well  Pain/Comfort:  · Pain Rating 1: 0/10      Objective:     Communicated with nurse Freeman prior to session.  Patient found HOB elevated with peripheral IV, oxygen, telemetry upon PT entry to room.     General Precautions: Standard, fall, respiratory   Orthopedic Precautions:N/A   Braces: N/A     Functional Mobility:  · Transfers:     · Sit to Stand:  contact guard assistance with rolling walker  · Gait: 150ft with RW/O2 at 3 liters and IV with 1 rest stand SAT 89% post gait, improved 94% with rest      AM-PAC 6 CLICK MOBILITY          Therapeutic Activities and Exercises:   OOB to  chair post PT  Pt encouraged ankle pumping exercises    Patient left up in chair with all lines intact and call button in reach..    GOALS:   Multidisciplinary Problems     Physical Therapy Goals        Problem: Physical Therapy Goal    Goal Priority Disciplines Outcome Goal Variances Interventions   Physical Therapy Goal     PT, PT/OT Ongoing (interventions implemented as appropriate)     Description:  Goals to be met by: 2019     Patient will increase functional independence with mobility by performin. Supine to sit with Stand-by Assistance  2. Sit to stand transfer with Stand-by Assistance  3. Bed to chair transfer with Stand-by Assistance   4. Gait  x 250 feet with Contact Guard Assistance using Rolling Walker.   5. Lower extremity exercise program x20 reps                     Time Tracking:     PT Received On: 19  PT Start Time: 952     PT Stop Time: 1010  PT Total Time (min): 18 min     Billable Minutes: Gait Training 18       PT/PTA: PT           Lynne Sellers, PT  2019

## 2019-04-08 NOTE — PLAN OF CARE
Problem: SLP Goal  Goal: SLP Goal    1) MBSS--MET  NEW: Consume recommended diet of mechanical soft (IDDSI-Minced and Moist) textures and nectar thick liquids with no overt s/s penetration/aspiration  2) Demonstrate use of compensatory swallow strategies/aspriation precautions with SPV  3) Perform CTAR, effortful swallow, Shaker and Mendelsohn with MIN cues         Participated in dysphagia therapy. Continue current POC. REC HH/OP ST at discharge.    Roseanna Dixon MS, CCC/SLP 4/8/2019

## 2019-04-08 NOTE — PROGRESS NOTES
INPATIENT NEPHROLOGY PROGRESS NOTE  [facility] NEPHROLOGY    Patient Name: Duc Spears  Date: 04/08/2019    Reason for consultation: COSTA    Chief Complaint: Dyspnea    History of Present Illness:  75M with COPD on 2L home oxygen, CAD (MI 1988), CHF, HTN, smoker for many years (quit 2 weeks) presented with SOB, wheezing for 2 weeks, undergoing treatment for COPD and CHF, consulted for COSTA.    · Interval History/Subjective:    - BP > 150/90, on 4L NC  - UOP 3.2L  - no cp, worsening dyspnea, abd pain, edema    · Review of Systems: All 14 systems reviewed and negative, except as noted in HPI    Plan of Care:    Assessment:  COSTA vs CKD  HTN  CHF exacerbation  Hypokalemia/Alkalosis  Anemia of CKD    Plan:    - Renal function is stabilizing. I think we are reaching our limit with IV diuresis (notably hypokalemic, worsening alkalosis). Cut back lasix gtt to 5mg/hr. Check ferritin and PTH to assess for CKD.   - BP is quite high. He was started on hydralazine today.  - See volume status discussion above (from an exam standpoint, minimal crackles and no LE edema).  - Ordered oral KCl repletion.  - He is on iron tablets.     Thank you for allowing us to participate in this patient's care. We will continue to follow.    Medications:  No current facility-administered medications on file prior to encounter.      Current Outpatient Medications on File Prior to Encounter   Medication Sig Dispense Refill    albuterol (VENTOLIN HFA) 90 mcg/actuation inhaler Ventolin HFA 90 mcg/actuation aerosol inhaler      atorvastatin (LIPITOR) 20 MG tablet atorvastatin 20 mg tablet      clopidogrel (PLAVIX) 75 mg tablet clopidogrel 75 mg tablet      cyanocobalamin (VITAMIN B-12) 1000 MCG tablet Vitamin B-12 1,000 mcg tablet   TK 1 T PO QD      escitalopram oxalate (LEXAPRO) 10 MG tablet escitalopram 10 mg tablet      ferrous sulfate (FEOSOL) 325 mg (65 mg iron) Tab tablet ferrous sulfate 325 mg (65 mg iron) tablet   TK 1 T PO TID       folic acid (FOLVITE) 1 MG tablet folic acid 1 mg tablet   TK 1 T PO D      hydroCHLOROthiazide (HYDRODIURIL) 50 MG tablet hydrochlorothiazide 50 mg tablet      hydrOXYzine pamoate (VISTARIL) 25 MG Cap hydroxyzine pamoate 25 mg capsule      losartan-hydrochlorothiazide 100-25 mg (HYZAAR) 100-25 mg per tablet losartan 100 mg-hydrochlorothiazide 25 mg tablet      omeprazole (PRILOSEC) 20 MG capsule omeprazole 20 mg capsule,delayed release      ondansetron (ZOFRAN) 4 MG tablet ondansetron HCl 4 mg tablet      oxybutynin (DITROPAN-XL) 5 MG TR24 oxybutynin chloride ER 5 mg tablet,extended release 24 hr   TK 1 T PO D      pantoprazole (PROTONIX) 40 MG tablet pantoprazole 40 mg tablet,delayed release      polyethylene glycol (GAVILYTE-G) 236-22.74-6.74 -5.86 gram suspension GaviLyte-G 236 gram-22.74 gram-6.74 gram-5.86 gram oral solution      potassium chloride SA (K-DUR,KLOR-CON) 20 MEQ tablet potassium chloride ER 20 mEq tablet,extended release(part/cryst)      pregabalin (LYRICA) 150 MG capsule Lyrica 150 mg capsule      sucralfate (CARAFATE) 1 gram tablet sucralfate 1 gram tablet      sulindac (CLINORIL) 200 MG Tab sulindac 200 mg tablet      HYDROcodone-acetaminophen (NORCO) 7.5-325 mg per tablet hydrocodone 7.5 mg-acetaminophen 325 mg tablet      nitroGLYCERIN (NITROSTAT) 0.4 MG SL tablet nitroglycerin 0.4 mg sublingual tablet      traZODone (DESYREL) 50 MG tablet trazodone 50 mg tablet       Scheduled Meds:   atorvastatin  20 mg Oral Daily    budesonide  0.5 mg Nebulization Q12H    carvedilol  6.25 mg Oral BID    cefTRIAXone (ROCEPHIN) IVPB  2 g Intravenous Q24H    clopidogrel  75 mg Oral Daily    doxycycline (VIBRAMYCIN) IVPB  100 mg Intravenous Q12H    enoxaparin  30 mg Subcutaneous Daily    escitalopram oxalate  10 mg Oral Daily    ferrous sulfate  325 mg Oral TID    folic acid  1 mg Oral Daily    hydrALAZINE  100 mg Oral Q8H    ipratropium  0.5 mg Nebulization Q6H    levalbuterol   1.25 mg Nebulization Q12H    oxybutynin  5 mg Oral Daily    pantoprazole  40 mg Oral Daily    potassium chloride SA  40 mEq Oral Once    traZODone  50 mg Oral QHS     Continuous Infusions:   furosemide (LASIX) 2 mg/mL infusion (titrating) 10 mg/hr (04/07/19 2029)     PRN Meds:.acetaminophen, albuterol-ipratropium, famotidine, hydrALAZINE, influenza, ondansetron, pneumoc 13-danie conj-dip cr(PF), ramelteon, senna-docusate 8.6-50 mg, sodium chloride 0.9%    Allergies:  Codeine    Vital Signs:  Temp Readings from Last 3 Encounters:   04/08/19 96.4 °F (35.8 °C) (Axillary)   04/03/19 98.1 °F (36.7 °C) (Axillary)       Pulse Readings from Last 3 Encounters:   04/08/19 73   04/03/19 72       BP Readings from Last 3 Encounters:   04/08/19 (!) 183/85   04/03/19 (!) 180/73       Weight:  Wt Readings from Last 3 Encounters:   04/08/19 81.6 kg (179 lb 14.4 oz)   04/02/19 86.2 kg (190 lb)       INS/OUTS:  I/O last 3 completed shifts:  In: 1481.3 [P.O.:350; I.V.:281.3; IV Piggyback:850]  Out: 4515 [Urine:4515]  No intake/output data recorded.    Physical Exam:  Constitutional: nad, aao x 3  Heart: rrr, no m/r/g, wwp, no edema  Lungs: faint crackles but minimal, no w/r/c, no lb  Abdomen: s/nt/nd, +BS    Results:  Lab Results   Component Value Date     04/08/2019    K 3.2 (L) 04/08/2019     04/08/2019    CO2 33 (H) 04/08/2019    BUN 55 (H) 04/08/2019    CREATININE 2.5 (H) 04/08/2019    CALCIUM 9.3 04/08/2019    ANIONGAP 10 04/08/2019    ESTGFRAFRICA 29 (A) 04/08/2019    EGFRNONAA 25 (A) 04/08/2019       Lab Results   Component Value Date    CALCIUM 9.3 04/08/2019    PHOS 5.0 (H) 04/08/2019       Recent Labs   Lab 04/08/19  0430   WBC 8.10   RBC 3.55*   HGB 9.2*   HCT 28.8*   *   MCV 81*   MCH 26.0*   MCHC 32.0       I have personally reviewed pertinent radiological imaging and reports.    Mark Sanders MD MPH  [facility] [address] [address]  [phone] (p)  [phone]  (f)

## 2019-04-08 NOTE — PLAN OF CARE
Patient receiving aerosol treatments with xopenex and atrovent q6 pulmicort q12 )2 on and in use per n/c 4lpm O2 sat 92% patient tolerated well

## 2019-04-08 NOTE — PT/OT/SLP PROGRESS
"Speech Language Pathology Treatment    Patient Name:  Duc Spears   MRN:  [id number]  Admitting Diagnosis: Acute on chronic respiratory failure with hypoxia    Recommendations:                 General Recommendations:  Dysphagia therapy  Diet recommendations:  Mechanical soft(IDDSI-Minced and Moist), Nectar Thick   Aspiration Precautions: 1 bite/sip at a time, Assistance with thickening liquids, Avoid talking while eating, Double swallow with each bite/sip, Meds crushed in puree, No straws, Small bites/sips and Wear oxygen during intake   General Precautions: Standard, aspiration  Communication strategies:  none      Subjective     "No straws."    Objective:   Pt seen for dysphagia therapy. He is up in chair, alert and cooperative. Thickened water at bedside table with no straws. He reports compliance with swallowing guidelines. Swallowing guidelines reviewed and re-posted at [facility]. Provided with graduated cup to ensure appropriate consistency of liquids. Performed Mendelsohn x5 with 5 second hold given MOD cues for technique. Effortful swallow x5 with ice chips.     Has the patient been evaluated by SLP for swallowing?   Yes  Keep patient NPO? No   Current Respiratory Status: nasal cannula      Assessment:   Participated in dysphagia therapy. Continue current POC. REC HH/OP ST at discharge.    Goals:   Multidisciplinary Problems     SLP Goals        Problem: SLP Goal    Goal Priority Disciplines Outcome   SLP Goal     SLP    Description:    1) MBSS--MET  NEW: Consume recommended diet of mechanical soft (IDDSI-Minced and Moist) textures and nectar thick liquids with no overt s/s penetration/aspiration  2) Demonstrate use of compensatory swallow strategies/aspriation precautions with SPV  3) Perform CTAR, effortful swallow, Shaker and Mendelsohn with MIN cues                        Plan:     · Patient to be seen:  5 x/week   · Plan of Care expires:  04/27/19  · Plan of Care reviewed with:  patient   · SLP " Follow-Up:  Yes       Discharge recommendations:  outpatient speech therapy, home health speech therapy   Barriers to Discharge:  None    Time Tracking:     SLP Treatment Date:   04/08/19  Speech Start Time:  1025  Speech Stop Time:  1037     Speech Total Time (min):  12 min    Billable Minutes: Treatment Swallowing Dysfunction 12 and Total Time 12    Roseanna Dixon CCC-SLP  04/08/2019

## 2019-04-08 NOTE — PROGRESS NOTES
Famotidine therapy for Duc Spears [id number] has been evaluated according to the pharmacy practice protocol.      Based on the patient's Estimated Creatinine Clearance: 25.8 mL/min (A) (based on SCr of 2.5 mg/dL (H))., famotidine therapy has been adjusted to 20 mg once daily prn heartburn.    Thank you,  Konstantin Hoff, PharmD

## 2019-04-08 NOTE — PROGRESS NOTES
The enoxaparin dose has been adjusted for Duc Spears [id number] according to the pharmacy practice protocol.      Based on the patient's Estimated Creatinine Clearance: 25.8 mL/min (A) (based on SCr of 2.5 mg/dL (H))., Body mass index is 27.35 kg/m²., and weight= 81.6 kg (179 lb 14.4 oz), the enoxaparin dose has been adjusted 30 mg every 24 hours.    Thank you,  Konstantin Hoff, PharmD

## 2019-04-09 LAB
25(OH)D3+25(OH)D2 SERPL-MCNC: 26 NG/ML (ref 30–96)
ANION GAP SERPL CALC-SCNC: 10 MMOL/L (ref 8–16)
ANTI SM ANTIBODY: 10.22 EU (ref 0–19.99)
ANTI SM/RNP ANTIBODY: 32.78 EU (ref 0–19.99)
ANTI-SM INTERPRETATION: NEGATIVE
ANTI-SM/RNP INTERPRETATION: POSITIVE
ANTI-SSA ANTIBODY: 1.47 EU (ref 0–19.99)
ANTI-SSA INTERPRETATION: NEGATIVE
ANTI-SSB ANTIBODY: 0.66 EU (ref 0–19.99)
ANTI-SSB INTERPRETATION: NEGATIVE
BASOPHILS # BLD AUTO: 0 K/UL (ref 0–0.2)
BASOPHILS NFR BLD: 0.1 % (ref 0–1.9)
BUN SERPL-MCNC: 56 MG/DL (ref 8–23)
CALCIUM SERPL-MCNC: 9.3 MG/DL (ref 8.7–10.5)
CHLORIDE SERPL-SCNC: 98 MMOL/L (ref 95–110)
CO2 SERPL-SCNC: 33 MMOL/L (ref 23–29)
CREAT SERPL-MCNC: 2.5 MG/DL (ref 0.5–1.4)
CREAT UR-MCNC: 37 MG/DL (ref 23–375)
DIFFERENTIAL METHOD: ABNORMAL
DSDNA AB SER-ACNC: ABNORMAL [IU]/ML
EOSINOPHIL # BLD AUTO: 0.1 K/UL (ref 0–0.5)
EOSINOPHIL NFR BLD: 1.5 % (ref 0–8)
ERYTHROCYTE [DISTWIDTH] IN BLOOD BY AUTOMATED COUNT: 19.1 % (ref 11.5–14.5)
EST. GFR  (AFRICAN AMERICAN): 29 ML/MIN/1.73 M^2
EST. GFR  (NON AFRICAN AMERICAN): 25 ML/MIN/1.73 M^2
GLUCOSE SERPL-MCNC: 111 MG/DL (ref 70–110)
HCT VFR BLD AUTO: 29.5 % (ref 40–54)
HGB BLD-MCNC: 9.2 G/DL (ref 14–18)
LYMPHOCYTES # BLD AUTO: 1.1 K/UL (ref 1–4.8)
LYMPHOCYTES NFR BLD: 11.4 % (ref 18–48)
MCH RBC QN AUTO: 25.5 PG (ref 27–31)
MCHC RBC AUTO-ENTMCNC: 31.2 G/DL (ref 32–36)
MCV RBC AUTO: 82 FL (ref 82–98)
MONOCYTES # BLD AUTO: 1 K/UL (ref 0.3–1)
MONOCYTES NFR BLD: 10.4 % (ref 4–15)
NEUTROPHILS # BLD AUTO: 7.3 K/UL (ref 1.8–7.7)
NEUTROPHILS NFR BLD: 76.6 % (ref 38–73)
PHOSPHATE SERPL-MCNC: 4.2 MG/DL (ref 2.7–4.5)
PLATELET # BLD AUTO: 139 K/UL (ref 150–350)
PMV BLD AUTO: 9.1 FL (ref 9.2–12.9)
POTASSIUM SERPL-SCNC: 3.4 MMOL/L (ref 3.5–5.1)
PROT UR-MCNC: 187 MG/DL (ref 0–15)
RBC # BLD AUTO: 3.62 M/UL (ref 4.6–6.2)
SODIUM SERPL-SCNC: 141 MMOL/L (ref 136–145)
WBC # BLD AUTO: 9.5 K/UL (ref 3.9–12.7)

## 2019-04-09 PROCEDURE — 25000003 PHARM REV CODE 250: Performed by: NURSE PRACTITIONER

## 2019-04-09 PROCEDURE — 97116 GAIT TRAINING THERAPY: CPT

## 2019-04-09 PROCEDURE — 25000003 PHARM REV CODE 250: Performed by: HOSPITALIST

## 2019-04-09 PROCEDURE — 25000242 PHARM REV CODE 250 ALT 637 W/ HCPCS: Performed by: INTERNAL MEDICINE

## 2019-04-09 PROCEDURE — 25000003 PHARM REV CODE 250: Performed by: INTERNAL MEDICINE

## 2019-04-09 PROCEDURE — 25000242 PHARM REV CODE 250 ALT 637 W/ HCPCS: Performed by: HOSPITALIST

## 2019-04-09 PROCEDURE — 84156 ASSAY OF PROTEIN URINE: CPT

## 2019-04-09 PROCEDURE — 85025 COMPLETE CBC W/AUTO DIFF WBC: CPT

## 2019-04-09 PROCEDURE — 94640 AIRWAY INHALATION TREATMENT: CPT

## 2019-04-09 PROCEDURE — 63600175 PHARM REV CODE 636 W HCPCS: Performed by: HOSPITALIST

## 2019-04-09 PROCEDURE — 82306 VITAMIN D 25 HYDROXY: CPT

## 2019-04-09 PROCEDURE — 27000221 HC OXYGEN, UP TO 24 HOURS

## 2019-04-09 PROCEDURE — 99232 PR SUBSEQUENT HOSPITAL CARE,LEVL II: ICD-10-PCS | Mod: ,,, | Performed by: INTERNAL MEDICINE

## 2019-04-09 PROCEDURE — 63600175 PHARM REV CODE 636 W HCPCS: Performed by: INTERNAL MEDICINE

## 2019-04-09 PROCEDURE — 84100 ASSAY OF PHOSPHORUS: CPT

## 2019-04-09 PROCEDURE — 99232 SBSQ HOSP IP/OBS MODERATE 35: CPT | Mod: ,,, | Performed by: INTERNAL MEDICINE

## 2019-04-09 PROCEDURE — 11000001 HC ACUTE MED/SURG PRIVATE ROOM

## 2019-04-09 PROCEDURE — 82570 ASSAY OF URINE CREATININE: CPT

## 2019-04-09 PROCEDURE — 94761 N-INVAS EAR/PLS OXIMETRY MLT: CPT

## 2019-04-09 PROCEDURE — 80048 BASIC METABOLIC PNL TOTAL CA: CPT

## 2019-04-09 PROCEDURE — 92526 ORAL FUNCTION THERAPY: CPT

## 2019-04-09 PROCEDURE — 36415 COLL VENOUS BLD VENIPUNCTURE: CPT

## 2019-04-09 RX ORDER — FUROSEMIDE 40 MG/1
40 TABLET ORAL DAILY
Status: DISCONTINUED | OUTPATIENT
Start: 2019-04-09 | End: 2019-04-12 | Stop reason: HOSPADM

## 2019-04-09 RX ORDER — POTASSIUM CHLORIDE 750 MG/1
40 TABLET, EXTENDED RELEASE ORAL DAILY
Status: DISCONTINUED | OUTPATIENT
Start: 2019-04-10 | End: 2019-04-10

## 2019-04-09 RX ORDER — POTASSIUM CHLORIDE 20 MEQ/1
20 TABLET, EXTENDED RELEASE ORAL ONCE
Status: COMPLETED | OUTPATIENT
Start: 2019-04-09 | End: 2019-04-09

## 2019-04-09 RX ADMIN — OXYBUTYNIN CHLORIDE 5 MG: 5 TABLET, EXTENDED RELEASE ORAL at 08:04

## 2019-04-09 RX ADMIN — FOLIC ACID 1 MG: 1 TABLET ORAL at 08:04

## 2019-04-09 RX ADMIN — ENOXAPARIN SODIUM 30 MG: 100 INJECTION SUBCUTANEOUS at 04:04

## 2019-04-09 RX ADMIN — HYDRALAZINE HYDROCHLORIDE 100 MG: 25 TABLET, FILM COATED ORAL at 09:04

## 2019-04-09 RX ADMIN — PANTOPRAZOLE SODIUM 40 MG: 40 TABLET, DELAYED RELEASE ORAL at 08:04

## 2019-04-09 RX ADMIN — HYDRALAZINE HYDROCHLORIDE 100 MG: 25 TABLET, FILM COATED ORAL at 05:04

## 2019-04-09 RX ADMIN — IPRATROPIUM BROMIDE 0.5 MG: 0.5 SOLUTION RESPIRATORY (INHALATION) at 07:04

## 2019-04-09 RX ADMIN — IPRATROPIUM BROMIDE 0.5 MG: 0.5 SOLUTION RESPIRATORY (INHALATION) at 01:04

## 2019-04-09 RX ADMIN — HYDRALAZINE HYDROCHLORIDE 100 MG: 25 TABLET, FILM COATED ORAL at 02:04

## 2019-04-09 RX ADMIN — CLOPIDOGREL BISULFATE 75 MG: 75 TABLET, FILM COATED ORAL at 08:04

## 2019-04-09 RX ADMIN — ATORVASTATIN CALCIUM 20 MG: 20 TABLET, FILM COATED ORAL at 08:04

## 2019-04-09 RX ADMIN — LEVALBUTEROL 1.25 MG: 1.25 SOLUTION, CONCENTRATE RESPIRATORY (INHALATION) at 07:04

## 2019-04-09 RX ADMIN — FUROSEMIDE 40 MG: 40 TABLET ORAL at 10:04

## 2019-04-09 RX ADMIN — FERROUS SULFATE TAB EC 325 MG (65 MG FE EQUIVALENT) 325 MG: 325 (65 FE) TABLET DELAYED RESPONSE at 08:04

## 2019-04-09 RX ADMIN — DOXYCYCLINE 100 MG: 100 INJECTION, POWDER, LYOPHILIZED, FOR SOLUTION INTRAVENOUS at 04:04

## 2019-04-09 RX ADMIN — BUDESONIDE 0.5 MG: 0.5 SUSPENSION RESPIRATORY (INHALATION) at 07:04

## 2019-04-09 RX ADMIN — CARVEDILOL 6.25 MG: 6.25 TABLET, FILM COATED ORAL at 08:04

## 2019-04-09 RX ADMIN — CEFTRIAXONE 2 G: 2 INJECTION, SOLUTION INTRAVENOUS at 02:04

## 2019-04-09 RX ADMIN — IPRATROPIUM BROMIDE 0.5 MG: 0.5 SOLUTION RESPIRATORY (INHALATION) at 12:04

## 2019-04-09 RX ADMIN — DOXYCYCLINE 100 MG: 100 INJECTION, POWDER, LYOPHILIZED, FOR SOLUTION INTRAVENOUS at 03:04

## 2019-04-09 RX ADMIN — FERROUS SULFATE TAB EC 325 MG (65 MG FE EQUIVALENT) 325 MG: 325 (65 FE) TABLET DELAYED RESPONSE at 02:04

## 2019-04-09 RX ADMIN — POTASSIUM CHLORIDE 20 MEQ: 20 TABLET, EXTENDED RELEASE ORAL at 10:04

## 2019-04-09 RX ADMIN — HYDRALAZINE HYDROCHLORIDE 10 MG: 20 INJECTION INTRAMUSCULAR; INTRAVENOUS at 04:04

## 2019-04-09 RX ADMIN — ESCITALOPRAM OXALATE 10 MG: 10 TABLET, FILM COATED ORAL at 08:04

## 2019-04-09 RX ADMIN — ACETAMINOPHEN 650 MG: 325 TABLET ORAL at 03:04

## 2019-04-09 RX ADMIN — TRAZODONE HYDROCHLORIDE 50 MG: 50 TABLET ORAL at 08:04

## 2019-04-09 RX ADMIN — ACETAMINOPHEN 650 MG: 325 TABLET ORAL at 08:04

## 2019-04-09 NOTE — PROGRESS NOTES
Progress Note  PULMONARY    Admit Date: 4/3/2019   04/09/2019      SUBJECTIVE:     April 4th, alert, on niv and tolerating well.    April 5, choked on breakfast, less sob, off niv now.  April 8, now on floor, progressing - better- no c/o  April 9th, no complaints        PFSH and Allergies reviewed.    OBJECTIVE:     Vitals (Most recent):  Vitals:    04/09/19 1157   BP: (!) 149/67   Pulse: 80   Resp: 18   Temp: 97.5 °F (36.4 °C)       Vitals (24 hour range):  Temp:  [97.5 °F (36.4 °C)-98.7 °F (37.1 °C)]   Pulse:  [72-95]   Resp:  [16-20]   BP: (130-198)/(60-91)   SpO2:  [88 %-96 %]       Intake/Output Summary (Last 24 hours) at 4/9/2019 1252  Last data filed at 4/9/2019 0500  Gross per 24 hour   Intake 313 ml   Output 1850 ml   Net -1537 ml          Physical Exam:  The patient's neuro status (alertness,orientation,cognitive function,motor skills,), pharyngeal exam (oral lesions, hygiene, abn dentition,), Neck (jvd,mass,thyroid,nodes in neck and above/below clavicle),RESPIRATORY(symmetry,effort,fremitus,percussion,auscultation),  Cor(rhythm,heart tones including gallops,perfusion,edema)ABD(distention,hepatic&splenomegaly,tenderness,masses), Skin(rash,cyanosis),Psyc(affect,judgement,).  Exam negative except for these pertinent findings:    Alert,nc, no distress, diffuse rales, no sign edema, symmetric, nl fremitus and percussion, distent cor    Radiographs reviewed: view by direct vision  4/5/19 cw pulm edeam and copd- not much changes- 4/6 stil dense lower lungs.      No results found for this or any previous visit.]    Labs     Recent Labs   Lab 04/09/19  0408   WBC 9.50   HGB 9.2*   HCT 29.5*   *     Recent Labs   Lab 04/09/19  0408      K 3.4*   CL 98   CO2 33*   BUN 56*   CREATININE 2.5*   *   CALCIUM 9.3   PHOS 4.2   No results for input(s): PH, PCO2, PO2, HCO3 in the last 24 hours.  Microbiology Results (last 7 days)     Procedure Component Value Units Date/Time    Blood culture [[id number]]  Collected:  04/03/19 0343    Order Status:  Completed Specimen:  Blood Updated:  04/08/19 1212     Blood Culture, Routine No growth after 5 days.    Culture, Respiratory with Gram Stain [[id number]] Collected:  04/04/19 1333    Order Status:  Completed Specimen:  Respiratory from Sputum, Expectorated Updated:  04/08/19 1035     Respiratory Culture Normal respiratory lester     Respiratory Culture No S aureus or Pseudomonas isolated.     Gram Stain (Respiratory) <10 epithelial cells per low power field.     Gram Stain (Respiratory) Rare WBC's     Gram Stain (Respiratory) Moderate Gram positive cocci     Gram Stain (Respiratory) Few yeast          Impression:  Active Hospital Problems    Diagnosis  POA    *Acute on chronic respiratory failure with hypoxia [J96.21]  Yes    Aspiration into airway [T17.908A]  No    CAD (coronary artery disease) [I25.10]  Yes    Elevated d-dimer [R79.89]  Yes    COPD exacerbation [J44.1]  Yes    Calcification of aorta [I70.0]  Yes    Acute pulmonary edema [J81.0]  Yes    Acute diastolic congestive heart failure [I50.31]  Yes    Essential hypertension [I10]  Yes    COSTA (acute kidney injury) [N17.9]  Yes      Resolved Hospital Problems   No resolved problems to display.               Plan:   April 4 - only 800cc negative on lasix 80q8- still in pulm edema, echo diastolic chf.  Renal would seem biggest issue.  Lasix drip or dialysis.  resp compensated but marginal..    Will order lasix drip.  Likely needs dialysis acutely.  Renal is seeing.    April 5 , I/o -2200 on lasix drip 10/hr, aspirated some breakfast but breathing better.  Creat better on lasix drip- continue drip.  Speech eval  gpc in sputum, improved but slow.  Question if aspiration significant?  procal was 0.12 on 3rd.    April 8, lytes good but cxr 4/6 still bilat lower lung symmetric dz.     F/u cxr and consider ct if not clearing?    Need to get off lasix drip- will stop am if cxr cleared..    April 9, cxr slowly  better, needs  effective diuretic regimen.  He still has edema in the lung and also pleural effusions bilaterally on x-ray.    Would be reasonable to consider outpatient management and office follow-up in a week.  Monitoring weights would be good.  Home health would be needed.  Patient is expected to be marginal given his edema problems/fluid retention                    .

## 2019-04-09 NOTE — PLAN OF CARE
04/08/19 1927   Patient Assessment/Suction   Level of Consciousness (AVPU) alert   Respiratory Effort Unlabored   Expansion/Accessory Muscles/Retractions no use of accessory muscles;no retractions   All Lung Fields Breath Sounds clear;diminished   Rhythm/Pattern, Respiratory unlabored   Cough Frequency infrequent   Cough Type good   PRE-TX-O2   O2 Device (Oxygen Therapy) nasal cannula   Flow (L/min) 4   Oxygen Concentration (%) 36   SpO2 (!) 94 %   Pulse Oximetry Type Intermittent   Pulse 86   Resp 16   Aerosol Therapy   $ Aerosol Therapy Charges Aerosol Treatment   Respiratory Treatment Status (SVN) given   Treatment Route (SVN) mask   Patient Position (SVN) HOB elevated   Post Treatment Assessment (SVN) breath sounds unchanged   Signs of Intolerance (SVN) none   Breath Sounds Post-Respiratory Treatment   Throughout All Fields Post-Treatment All Fields   Throughout All Fields Post-Treatment no change   Post-treatment Heart Rate (beats/min) 81   Post-treatment Resp Rate (breaths/min) 16   Ready to Wean/Extubation Screen   FIO2<=50 (chart decimal) 0.36   Preset CPAP/BiPAP Settings   Mode Of Delivery Standby

## 2019-04-09 NOTE — PLAN OF CARE
04/09/19 0714   Patient Assessment/Suction   Level of Consciousness (AVPU) alert   All Lung Fields Breath Sounds coarse;diminished   PRE-TX-O2   O2 Device (Oxygen Therapy) nasal cannula   $ Is the patient on Low Flow Oxygen? Yes   Flow (L/min) 4   Oxygen Concentration (%) 36   SpO2 (!) 88 %  (pt off NC placed on NC at 4 LPM)   Pulse Oximetry Type Intermittent   $ Pulse Oximetry - Multiple Charge Pulse Oximetry - Multiple   Pulse 72   Resp 18   Aerosol Therapy   $ Aerosol Therapy Charges Aerosol Treatment   Respiratory Treatment Status (SVN) given   Treatment Route (SVN) mask   Patient Position (SVN) HOB elevated   Post Treatment Assessment (SVN) breath sounds unchanged   Signs of Intolerance (SVN) none   Breath Sounds Post-Respiratory Treatment   Throughout All Fields Post-Treatment All Fields   Throughout All Fields Post-Treatment no change   Post-treatment Heart Rate (beats/min) 80   Post-treatment Resp Rate (breaths/min) 18   Ready to Wean/Extubation Screen   FIO2<=50 (chart decimal) 0.36

## 2019-04-09 NOTE — PROGRESS NOTES
Progress Note  Hospital Medicine  Patient Name:Duc Spears  MRN:  [id number]  Patient Class: IP- Inpatient  Admit Date: 4/3/2019  Length of Stay: 6 days  Expected Discharge Date:   Attending Physician: Mimi Landa MD  Primary Care Provider:  Primary Doctor No    SUBJECTIVE:     Principal Problem: Acute on chronic respiratory failure with hypoxia  Initial history of present illness: Mr. Spears is a 75yo M with a PMH of HTN, Chronic Respiratory failure O2 Dependent, and CAD. He presented to [facility] with c/o Dyspnea. It started 2 days ago and got progressively worst. On arrival to [facility], he was in respiratory distress and hypoxic. He was found to be in acute CHF and given nebulizers, high flow 02, and a total of 80mg IV Lasix. He was also found to have COSTA. [facility]. On arrival to ICU, he was in respiratory distress and hypoxic. A 1L bag of IV NS was in progress on his arrival, and about 500cc was left in the bag. The infusion was stopped. Bipap therapy was initiated and he was given another 40mg IV lasix for pulmonary congestion. eICU physician ordered nebulizers. A repeat CXR was done and was essentially unchanged from the one earlier done at [facility]. After initiateion of Bipap and a nebulizer treatment, he reports that he is starting to breath better. On Bipap, his SPO2 improved to 90%, up from 70's.     PMH/PSH/SH/FH/Meds: reviewed.    Symptoms/Review of Systems:  Patient seen and examined. Remains stable on lasix gtt. BP is high.  Feeling better, sitting in chair.  No acute events overnight.  Diet:  Soft mechanical nectar thick  Activity level:  Up with assist   Pain:  Patient reports no pain.       OBJECTIVE:   Vital Signs (Most Recent):      Temp: 98.3 °F (36.8 °C) (04/09/19 0747)  Pulse: 95 (04/09/19 0747)  Resp: 20 (04/09/19 0747)  BP: (!) 174/77 (04/09/19 0747)  SpO2: 96 % (04/09/19 0747)       Vital Signs Range (Last 24H):  Temp:  [96.4 °F (35.8 °C)-98.7  °F (37.1 °C)]   Pulse:  [72-95]   Resp:  [16-20]   BP: (130-198)/(60-91)   SpO2:  [88 %-96 %]     Weight: 81.6 kg (179 lb 14.4 oz)  Body mass index is 27.35 kg/m².    Intake/Output Summary (Last 24 hours) at 4/9/2019 0912  Last data filed at 4/9/2019 0500  Gross per 24 hour   Intake 313 ml   Output 1850 ml   Net -1537 ml     Physical Examination:  Constitutional: He is oriented to person, place, and time.   HENT:   Head: Normocephalic.   Eyes: Pupils are equal, round, and reactive to light.   Neck: Normal range of motion. Neck supple. No JVD present. No tracheal deviation present.   Cardiovascular: Normal rate, regular rhythm, normal heart sounds and intact distal pulses.   Pulmonary/Chest:Diffuse rhonchi bilaterally   Abdominal: Soft. Bowel sounds are normal. He exhibits no distension. There is no tenderness.   Musculoskeletal: Normal range of motion. He exhibits no edema.   Neurological: He is alert and oriented to person, place, and time. No cranial nerve deficit.   Skin: Skin is warm, dry and intact. Capillary refill takes less than 2 seconds.   CRANIAL NERVES   CN III, IV, VI   Pupils are equal, round, and reactive to light.    CBC:  Recent Labs   Lab 04/07/19 0337 04/08/19  0430 04/09/19  0408   WBC 9.10 8.10 9.50   RBC 3.78* 3.55* 3.62*   HGB 9.6* 9.2* 9.2*   HCT 30.5* 28.8* 29.5*    148* 139*   MCV 81* 81* 82   MCH 25.5* 26.0* 25.5*   MCHC 31.5* 32.0 31.2*   BMP  Recent Labs   Lab 04/03/19  0310  04/07/19  0337 04/08/19  0430 04/09/19  0408      < > 120* 106 111*      < > 146* 143 141   K 4.1   < > 3.2* 3.2* 3.4*      < > 103 100 98   CO2 23   < > 31* 33* 33*   BUN 81*   < > 65* 55* 56*   CREATININE 3.9*   < > 2.4* 2.5* 2.5*   CALCIUM 9.4   < > 9.3 9.3 9.3   MG 2.5  --  1.8  --   --     < > = values in this interval not displayed.      Diagnostic Results:  Microbiology Results (last 7 days)     Procedure Component Value Units Date/Time    Blood culture [[id number]] Collected:   04/03/19 0343    Order Status:  Completed Specimen:  Blood Updated:  04/08/19 1212     Blood Culture, Routine No growth after 5 days.    Culture, Respiratory with Gram Stain [[id number]] Collected:  04/04/19 1333    Order Status:  Completed Specimen:  Respiratory from Sputum, Expectorated Updated:  04/08/19 1035     Respiratory Culture Normal respiratory lester     Respiratory Culture No S aureus or Pseudomonas isolated.     Gram Stain (Respiratory) <10 epithelial cells per low power field.     Gram Stain (Respiratory) Rare WBC's     Gram Stain (Respiratory) Moderate Gram positive cocci     Gram Stain (Respiratory) Few yeast         Renal US:  Features of medical renal disease.  No hydronephrosis. Cholelithiasis.    CXR:  1. Bilateral pleural effusions between small to moderate in size.  2. Bilateral airspace disease which could reflect atelectasis, pneumonia, pulmonary edema, acute lung injury/ARDS.  3. Cardiomegaly.    ECHO:  · Concentric left ventricular hypertrophy.  · Normal left ventricular systolic function. The estimated ejection fraction is 58%  · Grade III (severe) left ventricular diastolic dysfunction consistent with restrictive physiology.  · Elevated left atrial pressure.  · Low normal right ventricular systolic function.  · Moderate left atrial enlargement.  · Mild right atrial enlargement.  · Mild mitral regurgitation.  · Mild tricuspid regurgitation.  · Elevated central venous pressure (15 mm Hg).  · The estimated PA systolic pressure is 43 mm Hg  · Pulmonary hypertension present.  · There is a moderate left pleural effusion.  · Pericardial effusion.    CXR: Bilateral airspace disease and pleural effusions, similar in extent.       MBSS: Moderate dysphagia.        CXR; Continued bilateral perihilar and lower lobe infiltrates.  Borderline heart size.  Atherosclerosis.       Bilateral leg venous doppler scan: No evidence of deep venous thrombosis in either lower extremity.       CXR: Persistent features  of congestive failure accompanied by layering bilateral pleural effusions.  Mild improvement in aeration of the lung bases noted.    Assessment/Plan:   * Acute on chronic respiratory failure with hypoxia  Continue supplemental oxygen to maintain pulse ox above 92%.  Use BiPAP noninvasive ventilatory therapy as needed.  Swallowing evaluation for possible aspiration.  On IV Rocephin and doxycycline.  Follow pulmonary recommendations.  Continue beta 2 agonist bronchodilator nebulization treatments.     Acute on chronic diastolic congestive heart failure exacerbation  Follow Cardiology recommendations.  Echo results reviewed.  DC IV Lasix gtt.  Start Lasix 40 mg q.day  Hold chronic ARB due to COSTA.  Continue Coreg - titrate as needed  Monitor on telemetry.     COSTA (acute kidney injury) - stabilizing  Hold ARB, and Sulindac for now.  Follow Nephrology recommendation.  Renal ultrasound results reviewed.  Strict I&O  Avoid NSAIDs/Nephrotoxins  Renal dose medications    CAD (coronary artery disease)  Continue statin and plavix  Not on chronic ASA     Essential hypertension  Chronic/Controlled. Continue chronic medications, adjusting as needed.    Nonsustained ventricular tachycardia, asymptomatic  Telemonitoring.  Continue serial cardiac enzymes.  Follow Cardiology recommendation    Physical deconditioning  PT/OT.    Hyperkalemia  Replete potassium chloride.  Follow BMP.    I offered patient skilled nursing facility placement.  Patient is reluctant and wants to discuss with his wife.       VTE Risk Mitigation (From admission, onward)        Ordered     enoxaparin injection 30 mg  Daily      04/08/19 1259     IP VTE HIGH RISK PATIENT  Once      04/03/19 0252     Place TIANA hose  Until discontinued      04/03/19 0252        Mimi Landa MD  Department of Hospital Medicine   [facility]

## 2019-04-09 NOTE — PLAN OF CARE
Problem: Adult Inpatient Plan of Care  Goal: Plan of Care Review  Outcome: Ongoing (interventions implemented as appropriate)  Pt remained free from injury/falls this shift. VSS- no signs of any distress. Tele NSR. POC reviewed with pt. Pt repositioned independently, skin intact. Lasix gtt discontinued this shift. Pt denied any pain this shift. Bed locked in lowest position, SR upx2, call light within reach. Will continue to monitor.

## 2019-04-09 NOTE — PLAN OF CARE
Problem: Physical Therapy Goal  Goal: Physical Therapy Goal  Goals to be met by: 2019     Patient will increase functional independence with mobility by performin. Supine to sit with Stand-by Assistance  2. Sit to stand transfer with Stand-by Assistance  3. Bed to chair transfer with Stand-by Assistance   4. Gait  x 250 feet with Contact Guard Assistance using Rolling Walker.   5. Lower extremity exercise program x20 reps    Outcome: Ongoing (interventions implemented as appropriate)  Therapeutic activity : bed mobility,transfers, gait with rw and CGA with O2 attached.

## 2019-04-09 NOTE — PT/OT/SLP PROGRESS
Physical Therapy Treatment    Patient Name:  Duc Spears   MRN:  [id number]    Recommendations:     Discharge Recommendations:  home health PT   Discharge Equipment Recommendations: walker, rolling   Barriers to discharge: None    Assessment:     Duc Spears is a 74 y.o. male admitted with a medical diagnosis of Acute on chronic respiratory failure with hypoxia.  He presents with the following impairments/functional limitations:  weakness, impaired endurance, impaired functional mobilty, impaired cardiopulmonary response to activity .    Rehab Prognosis: Fair; patient would benefit from acute skilled PT services to address these deficits and reach maximum level of function.    Recent Surgery: Procedure(s) (LRB):  Bronchoscopy - need to cleanout, in icu best.  marginal pt (N/A)      Plan:     During this hospitalization, patient to be seen 6 x/week to address the identified rehab impairments via gait training, therapeutic activities, therapeutic exercises and progress toward the following goals:    · Plan of Care Expires:  04/26/19    Subjective     Chief Complaint: hungry  Patient/Family Comments/goals: none stated  Pain/Comfort:  · Pain Rating 1: 0/10      Objective:     Communicated with nurse Valenzuela prior to session.  Patient found supine with telemetry, peripheral IV, bed alarm, oxygen, castaneda catheter upon PT entry to room.     General Precautions: Standard, fall   Orthopedic Precautions:N/A   Braces:       Functional Mobility:  · Bed Mobility:     · Rolling Right: minimum assistance  · Bridging: minimum assistance  · Transfers:     · Sit to Stand:  minimum assistance with rolling walker  · Gait: 200' with rw and CGA with O2 attached and IV in tow.      AM-PAC 6 CLICK MOBILITY          Therapeutic Activities and Exercises:   SPT from bed to chair following ambulation with CGA.    Patient left up in chair with all lines intact, call button in reach, chair alarm on and nurse Valenzuela  notified..    GOALS:   Multidisciplinary Problems     Physical Therapy Goals        Problem: Physical Therapy Goal    Goal Priority Disciplines Outcome Goal Variances Interventions   Physical Therapy Goal     PT, PT/OT Ongoing (interventions implemented as appropriate)     Description:  Goals to be met by: 2019     Patient will increase functional independence with mobility by performin. Supine to sit with Stand-by Assistance  2. Sit to stand transfer with Stand-by Assistance  3. Bed to chair transfer with Stand-by Assistance   4. Gait  x 250 feet with Contact Guard Assistance using Rolling Walker.   5. Lower extremity exercise program x20 reps                     Time Tracking:     PT Received On: 19  PT Start Time: 930     PT Stop Time: 945  PT Total Time (min): 15 min     Billable Minutes: Gait Training 15min    Treatment Type: Treatment  PT/PTA: PTA     PTA Visit Number: 1     Zee Retana PTA  2019

## 2019-04-09 NOTE — PROGRESS NOTES
INPATIENT NEPHROLOGY PROGRESS NOTE  [facility] NEPHROLOGY    Patient Name: Duc Spears  Date: 04/09/2019    Reason for consultation: COSTA    Chief Complaint: Dyspnea    History of Present Illness:  75M with COPD on 2L home oxygen, CAD (MI 1988), CHF, HTN, smoker for many years (quit 2 weeks) presented with SOB, wheezing for 2 weeks, undergoing treatment for COPD and CHF, consulted for COSTA.    · Interval History/Subjective:    - BP is better today, on 4L NC  - UOP 1.8L  - no cp, worsening dyspnea, abd pain, edema    · Review of Systems: All 14 systems reviewed and negative, except as noted in HPI    Plan of Care:    Assessment:  COSTA vs CKD  HTN  CHF exacerbation  Hypokalemia/Alkalosis  SHPT  Anemia     Plan:    - Renal function is stable. He is nonoliguric. Recheck UA and UPCR. Based on PTH results, suspect he has underlying CKD. No NSAIDs or IV contrast.   - BP/VS are improved. Keep ARB on hold. Switched to oral lasix today and given 1x dose of metolazone.   - He was given oral KCl repletion per sliding scale. Ordered oral KCl 40mEQ daily standing for tomorrow.  - Check 25 vitamin D. Based on results, will decide which type of vitamin D he needs.  - He is iron deficient. He is on iron tablets TID.     Thank you for allowing us to participate in this patient's care. We will continue to follow.    Medications:  No current facility-administered medications on file prior to encounter.      Current Outpatient Medications on File Prior to Encounter   Medication Sig Dispense Refill    albuterol (VENTOLIN HFA) 90 mcg/actuation inhaler Ventolin HFA 90 mcg/actuation aerosol inhaler      atorvastatin (LIPITOR) 20 MG tablet atorvastatin 20 mg tablet      clopidogrel (PLAVIX) 75 mg tablet clopidogrel 75 mg tablet      cyanocobalamin (VITAMIN B-12) 1000 MCG tablet Vitamin B-12 1,000 mcg tablet   TK 1 T PO QD      escitalopram oxalate (LEXAPRO) 10 MG tablet escitalopram 10 mg tablet      ferrous sulfate (FEOSOL) 325  mg (65 mg iron) Tab tablet ferrous sulfate 325 mg (65 mg iron) tablet   TK 1 T PO TID      folic acid (FOLVITE) 1 MG tablet folic acid 1 mg tablet   TK 1 T PO D      hydroCHLOROthiazide (HYDRODIURIL) 50 MG tablet hydrochlorothiazide 50 mg tablet      hydrOXYzine pamoate (VISTARIL) 25 MG Cap hydroxyzine pamoate 25 mg capsule      losartan-hydrochlorothiazide 100-25 mg (HYZAAR) 100-25 mg per tablet losartan 100 mg-hydrochlorothiazide 25 mg tablet      omeprazole (PRILOSEC) 20 MG capsule omeprazole 20 mg capsule,delayed release      ondansetron (ZOFRAN) 4 MG tablet ondansetron HCl 4 mg tablet      oxybutynin (DITROPAN-XL) 5 MG TR24 oxybutynin chloride ER 5 mg tablet,extended release 24 hr   TK 1 T PO D      pantoprazole (PROTONIX) 40 MG tablet pantoprazole 40 mg tablet,delayed release      polyethylene glycol (GAVILYTE-G) 236-22.74-6.74 -5.86 gram suspension GaviLyte-G 236 gram-22.74 gram-6.74 gram-5.86 gram oral solution      potassium chloride SA (K-DUR,KLOR-CON) 20 MEQ tablet potassium chloride ER 20 mEq tablet,extended release(part/cryst)      pregabalin (LYRICA) 150 MG capsule Lyrica 150 mg capsule      sucralfate (CARAFATE) 1 gram tablet sucralfate 1 gram tablet      sulindac (CLINORIL) 200 MG Tab sulindac 200 mg tablet      HYDROcodone-acetaminophen (NORCO) 7.5-325 mg per tablet hydrocodone 7.5 mg-acetaminophen 325 mg tablet      nitroGLYCERIN (NITROSTAT) 0.4 MG SL tablet nitroglycerin 0.4 mg sublingual tablet      traZODone (DESYREL) 50 MG tablet trazodone 50 mg tablet       Scheduled Meds:   atorvastatin  20 mg Oral Daily    budesonide  0.5 mg Nebulization Q12H    carvedilol  6.25 mg Oral BID    cefTRIAXone (ROCEPHIN) IVPB  2 g Intravenous Q24H    clopidogrel  75 mg Oral Daily    doxycycline (VIBRAMYCIN) IVPB  100 mg Intravenous Q12H    enoxaparin  30 mg Subcutaneous Daily    escitalopram oxalate  10 mg Oral Daily    ferrous sulfate  325 mg Oral TID    folic acid  1 mg Oral Daily     furosemide  40 mg Oral Daily    hydrALAZINE  100 mg Oral Q8H    ipratropium  0.5 mg Nebulization Q6H    levalbuterol  1.25 mg Nebulization Q12H    oxybutynin  5 mg Oral Daily    pantoprazole  40 mg Oral Daily    traZODone  50 mg Oral QHS     Continuous Infusions:    PRN Meds:.acetaminophen, albuterol-ipratropium, famotidine, hydrALAZINE, influenza, ondansetron, pneumoc 13-danie conj-dip cr(PF), ramelteon, senna-docusate 8.6-50 mg, sodium chloride 0.9%    Allergies:  Codeine    Vital Signs:  Temp Readings from Last 3 Encounters:   04/09/19 97.5 °F (36.4 °C)   04/03/19 98.1 °F (36.7 °C) (Axillary)       Pulse Readings from Last 3 Encounters:   04/09/19 80   04/03/19 72       BP Readings from Last 3 Encounters:   04/09/19 (!) 149/67   04/03/19 (!) 180/73       Weight:  Wt Readings from Last 3 Encounters:   04/08/19 81.6 kg (179 lb 14.4 oz)   04/02/19 86.2 kg (190 lb)       INS/OUTS:  I/O last 3 completed shifts:  In: 1023 [P.O.:350; I.V.:123; IV Piggyback:550]  Out: 2950 [Urine:2950]  No intake/output data recorded.    Physical Exam:  Constitutional: nad, aao x 3  Heart: rrr, no m/r/g, wwp, no edema  Lungs: ctab, no w/r/c, no lb  Abdomen: s/nt/nd, +BS    Results:  Lab Results   Component Value Date     04/09/2019    K 3.4 (L) 04/09/2019    CL 98 04/09/2019    CO2 33 (H) 04/09/2019    BUN 56 (H) 04/09/2019    CREATININE 2.5 (H) 04/09/2019    CALCIUM 9.3 04/09/2019    ANIONGAP 10 04/09/2019    ESTGFRAFRICA 29 (A) 04/09/2019    EGFRNONAA 25 (A) 04/09/2019       Lab Results   Component Value Date    CALCIUM 9.3 04/09/2019    PHOS 4.2 04/09/2019       Recent Labs   Lab 04/09/19  0408   WBC 9.50   RBC 3.62*   HGB 9.2*   HCT 29.5*   *   MCV 82   MCH 25.5*   MCHC 31.2*       I have personally reviewed pertinent radiological imaging and reports.    Mark Sanders MD MPH  [facility] [address] [place] [address]  [phone] (p)  [phone] (f)

## 2019-04-09 NOTE — PLAN OF CARE
Problem: Adult Inpatient Plan of Care  Goal: Plan of Care Review  Outcome: Ongoing (interventions implemented as appropriate)  Blood pressure improving  IV abx administered   Chao with clear, yellow urine  Sob on exertion  Pt remained free of falls, pt safety maintained

## 2019-04-10 LAB
ANION GAP SERPL CALC-SCNC: 12 MMOL/L (ref 8–16)
B2 MICROGLOB SERPL-MCNC: 6.2 UG/ML (ref 0–2.5)
BASOPHILS # BLD AUTO: 0 K/UL (ref 0–0.2)
BASOPHILS NFR BLD: 0.2 % (ref 0–1.9)
BUN SERPL-MCNC: 55 MG/DL (ref 8–23)
CALCIUM SERPL-MCNC: 9.7 MG/DL (ref 8.7–10.5)
CHLORIDE SERPL-SCNC: 94 MMOL/L (ref 95–110)
CO2 SERPL-SCNC: 33 MMOL/L (ref 23–29)
CREAT SERPL-MCNC: 2.5 MG/DL (ref 0.5–1.4)
DIFFERENTIAL METHOD: ABNORMAL
EOSINOPHIL # BLD AUTO: 0.1 K/UL (ref 0–0.5)
EOSINOPHIL NFR BLD: 1.1 % (ref 0–8)
ERYTHROCYTE [DISTWIDTH] IN BLOOD BY AUTOMATED COUNT: 19.6 % (ref 11.5–14.5)
EST. GFR  (AFRICAN AMERICAN): 29 ML/MIN/1.73 M^2
EST. GFR  (NON AFRICAN AMERICAN): 25 ML/MIN/1.73 M^2
GLUCOSE SERPL-MCNC: 111 MG/DL (ref 70–110)
HCT VFR BLD AUTO: 29.6 % (ref 40–54)
HGB BLD-MCNC: 9.6 G/DL (ref 14–18)
LYMPHOCYTES # BLD AUTO: 1 K/UL (ref 1–4.8)
LYMPHOCYTES NFR BLD: 9 % (ref 18–48)
MCH RBC QN AUTO: 26.3 PG (ref 27–31)
MCHC RBC AUTO-ENTMCNC: 32.4 G/DL (ref 32–36)
MCV RBC AUTO: 81 FL (ref 82–98)
MONOCYTES # BLD AUTO: 0.9 K/UL (ref 0.3–1)
MONOCYTES NFR BLD: 7.9 % (ref 4–15)
NEUTROPHILS # BLD AUTO: 9 K/UL (ref 1.8–7.7)
NEUTROPHILS NFR BLD: 81.8 % (ref 38–73)
PHOSPHATE SERPL-MCNC: 4.3 MG/DL (ref 2.7–4.5)
PLATELET # BLD AUTO: 149 K/UL (ref 150–350)
PMV BLD AUTO: 9.3 FL (ref 9.2–12.9)
POTASSIUM SERPL-SCNC: 3.2 MMOL/L (ref 3.5–5.1)
RBC # BLD AUTO: 3.64 M/UL (ref 4.6–6.2)
SODIUM SERPL-SCNC: 139 MMOL/L (ref 136–145)
TROPONIN I SERPL DL<=0.01 NG/ML-MCNC: 0.05 NG/ML (ref 0–0.03)
TROPONIN I SERPL DL<=0.01 NG/ML-MCNC: 0.06 NG/ML (ref 0–0.03)
TROPONIN I SERPL DL<=0.01 NG/ML-MCNC: 0.06 NG/ML (ref 0–0.03)
WBC # BLD AUTO: 11 K/UL (ref 3.9–12.7)

## 2019-04-10 PROCEDURE — 92526 ORAL FUNCTION THERAPY: CPT

## 2019-04-10 PROCEDURE — 83520 IMMUNOASSAY QUANT NOS NONAB: CPT

## 2019-04-10 PROCEDURE — 86703 HIV-1/HIV-2 1 RESULT ANTBDY: CPT

## 2019-04-10 PROCEDURE — 99232 SBSQ HOSP IP/OBS MODERATE 35: CPT | Mod: ,,, | Performed by: INTERNAL MEDICINE

## 2019-04-10 PROCEDURE — 85025 COMPLETE CBC W/AUTO DIFF WBC: CPT

## 2019-04-10 PROCEDURE — 80048 BASIC METABOLIC PNL TOTAL CA: CPT

## 2019-04-10 PROCEDURE — 63600175 PHARM REV CODE 636 W HCPCS: Performed by: INTERNAL MEDICINE

## 2019-04-10 PROCEDURE — 36415 COLL VENOUS BLD VENIPUNCTURE: CPT

## 2019-04-10 PROCEDURE — 25000242 PHARM REV CODE 250 ALT 637 W/ HCPCS: Performed by: HOSPITALIST

## 2019-04-10 PROCEDURE — 93005 ELECTROCARDIOGRAM TRACING: CPT

## 2019-04-10 PROCEDURE — 99232 PR SUBSEQUENT HOSPITAL CARE,LEVL II: ICD-10-PCS | Mod: ,,, | Performed by: INTERNAL MEDICINE

## 2019-04-10 PROCEDURE — 94640 AIRWAY INHALATION TREATMENT: CPT

## 2019-04-10 PROCEDURE — 80074 ACUTE HEPATITIS PANEL: CPT

## 2019-04-10 PROCEDURE — 11000001 HC ACUTE MED/SURG PRIVATE ROOM

## 2019-04-10 PROCEDURE — 63600175 PHARM REV CODE 636 W HCPCS: Performed by: HOSPITALIST

## 2019-04-10 PROCEDURE — 84100 ASSAY OF PHOSPHORUS: CPT

## 2019-04-10 PROCEDURE — 25000003 PHARM REV CODE 250: Performed by: INTERNAL MEDICINE

## 2019-04-10 PROCEDURE — 94761 N-INVAS EAR/PLS OXIMETRY MLT: CPT

## 2019-04-10 PROCEDURE — 27000221 HC OXYGEN, UP TO 24 HOURS

## 2019-04-10 PROCEDURE — 25000003 PHARM REV CODE 250: Performed by: NURSE PRACTITIONER

## 2019-04-10 PROCEDURE — 84484 ASSAY OF TROPONIN QUANT: CPT

## 2019-04-10 PROCEDURE — 63600175 PHARM REV CODE 636 W HCPCS: Performed by: NURSE PRACTITIONER

## 2019-04-10 PROCEDURE — 82232 ASSAY OF BETA-2 PROTEIN: CPT

## 2019-04-10 PROCEDURE — 25000242 PHARM REV CODE 250 ALT 637 W/ HCPCS: Performed by: INTERNAL MEDICINE

## 2019-04-10 PROCEDURE — 82595 ASSAY OF CRYOGLOBULIN: CPT

## 2019-04-10 PROCEDURE — 86255 FLUORESCENT ANTIBODY SCREEN: CPT | Mod: 91

## 2019-04-10 PROCEDURE — 97116 GAIT TRAINING THERAPY: CPT

## 2019-04-10 PROCEDURE — 25000003 PHARM REV CODE 250: Performed by: HOSPITALIST

## 2019-04-10 PROCEDURE — 99900035 HC TECH TIME PER 15 MIN (STAT)

## 2019-04-10 RX ORDER — NIFEDIPINE 30 MG/1
30 TABLET, EXTENDED RELEASE ORAL DAILY
Status: DISCONTINUED | OUTPATIENT
Start: 2019-04-10 | End: 2019-04-12 | Stop reason: HOSPADM

## 2019-04-10 RX ORDER — AMLODIPINE BESYLATE 5 MG/1
5 TABLET ORAL DAILY
Status: DISCONTINUED | OUTPATIENT
Start: 2019-04-10 | End: 2019-04-10

## 2019-04-10 RX ORDER — CHOLECALCIFEROL (VITAMIN D3) 25 MCG
2000 TABLET ORAL DAILY
Status: DISCONTINUED | OUTPATIENT
Start: 2019-04-10 | End: 2019-04-12 | Stop reason: HOSPADM

## 2019-04-10 RX ORDER — POTASSIUM CHLORIDE 750 MG/1
40 TABLET, EXTENDED RELEASE ORAL 2 TIMES DAILY
Status: DISCONTINUED | OUTPATIENT
Start: 2019-04-10 | End: 2019-04-12 | Stop reason: HOSPADM

## 2019-04-10 RX ADMIN — POTASSIUM CHLORIDE 40 MEQ: 750 TABLET, EXTENDED RELEASE ORAL at 09:04

## 2019-04-10 RX ADMIN — CARVEDILOL 6.25 MG: 6.25 TABLET, FILM COATED ORAL at 09:04

## 2019-04-10 RX ADMIN — DOXYCYCLINE 100 MG: 100 INJECTION, POWDER, LYOPHILIZED, FOR SOLUTION INTRAVENOUS at 04:04

## 2019-04-10 RX ADMIN — FERROUS SULFATE TAB EC 325 MG (65 MG FE EQUIVALENT) 325 MG: 325 (65 FE) TABLET DELAYED RESPONSE at 09:04

## 2019-04-10 RX ADMIN — BUDESONIDE 0.5 MG: 0.5 SUSPENSION RESPIRATORY (INHALATION) at 07:04

## 2019-04-10 RX ADMIN — HYDRALAZINE HYDROCHLORIDE 100 MG: 25 TABLET, FILM COATED ORAL at 03:04

## 2019-04-10 RX ADMIN — FERROUS SULFATE TAB EC 325 MG (65 MG FE EQUIVALENT) 325 MG: 325 (65 FE) TABLET DELAYED RESPONSE at 03:04

## 2019-04-10 RX ADMIN — HYDRALAZINE HYDROCHLORIDE 100 MG: 25 TABLET, FILM COATED ORAL at 05:04

## 2019-04-10 RX ADMIN — OXYBUTYNIN CHLORIDE 5 MG: 5 TABLET, EXTENDED RELEASE ORAL at 09:04

## 2019-04-10 RX ADMIN — HYDRALAZINE HYDROCHLORIDE 100 MG: 25 TABLET, FILM COATED ORAL at 09:04

## 2019-04-10 RX ADMIN — VITAMIN D, TAB 1000IU (100/BT) 2000 UNITS: 25 TAB at 12:04

## 2019-04-10 RX ADMIN — BUDESONIDE 0.5 MG: 0.5 SUSPENSION RESPIRATORY (INHALATION) at 08:04

## 2019-04-10 RX ADMIN — FUROSEMIDE 40 MG: 40 TABLET ORAL at 09:04

## 2019-04-10 RX ADMIN — IPRATROPIUM BROMIDE 0.5 MG: 0.5 SOLUTION RESPIRATORY (INHALATION) at 01:04

## 2019-04-10 RX ADMIN — CLOPIDOGREL BISULFATE 75 MG: 75 TABLET, FILM COATED ORAL at 09:04

## 2019-04-10 RX ADMIN — LEVALBUTEROL 1.25 MG: 1.25 SOLUTION, CONCENTRATE RESPIRATORY (INHALATION) at 08:04

## 2019-04-10 RX ADMIN — ONDANSETRON 4 MG: 2 INJECTION INTRAMUSCULAR; INTRAVENOUS at 09:04

## 2019-04-10 RX ADMIN — ONDANSETRON 4 MG: 2 INJECTION INTRAMUSCULAR; INTRAVENOUS at 05:04

## 2019-04-10 RX ADMIN — PANTOPRAZOLE SODIUM 40 MG: 40 TABLET, DELAYED RELEASE ORAL at 09:04

## 2019-04-10 RX ADMIN — FOLIC ACID 1 MG: 1 TABLET ORAL at 09:04

## 2019-04-10 RX ADMIN — IPRATROPIUM BROMIDE 0.5 MG: 0.5 SOLUTION RESPIRATORY (INHALATION) at 07:04

## 2019-04-10 RX ADMIN — ESCITALOPRAM OXALATE 10 MG: 10 TABLET, FILM COATED ORAL at 09:04

## 2019-04-10 RX ADMIN — ENOXAPARIN SODIUM 30 MG: 100 INJECTION SUBCUTANEOUS at 05:04

## 2019-04-10 RX ADMIN — CEFTRIAXONE 2 G: 2 INJECTION, SOLUTION INTRAVENOUS at 02:04

## 2019-04-10 RX ADMIN — LEVALBUTEROL 1.25 MG: 1.25 SOLUTION, CONCENTRATE RESPIRATORY (INHALATION) at 07:04

## 2019-04-10 RX ADMIN — NIFEDIPINE 30 MG: 30 TABLET, FILM COATED, EXTENDED RELEASE ORAL at 12:04

## 2019-04-10 RX ADMIN — ATORVASTATIN CALCIUM 20 MG: 20 TABLET, FILM COATED ORAL at 09:04

## 2019-04-10 RX ADMIN — TRAZODONE HYDROCHLORIDE 50 MG: 50 TABLET ORAL at 09:04

## 2019-04-10 RX ADMIN — IPRATROPIUM BROMIDE 0.5 MG: 0.5 SOLUTION RESPIRATORY (INHALATION) at 08:04

## 2019-04-10 RX ADMIN — IPRATROPIUM BROMIDE 0.5 MG: 0.5 SOLUTION RESPIRATORY (INHALATION) at 12:04

## 2019-04-10 NOTE — PROGRESS NOTES
INPATIENT NEPHROLOGY PROGRESS NOTE  [facility] NEPHROLOGY    Patient Name: Duc Spears  Date: 04/10/2019    Reason for consultation: COSTA    Chief Complaint: Dyspnea    History of Present Illness:  75M with COPD on 2L home oxygen, CAD (MI 1988), CHF, HTN, smoker for many years (quit 2 weeks) presented with SOB, wheezing for 2 weeks, undergoing treatment for COPD and CHF, consulted for COSTA.    · Interval History/Subjective:    - BP is high this AM, on 4L NC  - UOP 3.6L  - no cp or worsening edema  - he coughed all night- with some blood tinged sputum, had difficulty with sleep    Echo:  · Concentric left ventricular hypertrophy.  · Normal left ventricular systolic function. The estimated ejection fraction is 58%  · Grade III (severe) left ventricular diastolic dysfunction consistent with restrictive physiology.  · Elevated left atrial pressure.  · Low normal right ventricular systolic function.  · Moderate left atrial enlargement.  · Mild right atrial enlargement.  · Mild mitral regurgitation.  · Mild tricuspid regurgitation.  · Elevated central venous pressure (15 mm Hg).  · The estimated PA systolic pressure is 43 mm Hg  · Pulmonary hypertension present.  · There is a moderate left pleural effusion.  · Pericardial effusion.    · Review of Systems: All 14 systems reviewed and negative, except as noted in HPI    Plan of Care:    Assessment:  COSTA vs CKD  HTN  CHF exacerbation (baseline COPD with pulm HTN/restrictive physiology with DD)  Hypokalemia/Alkalosis  SHPT  Anemia     Plan:    - Renal function is stable. He is nonoliguric. Prior UA infected- awaiting repeat to evaluate for hematuria. He has 5g proteinuria. 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 Complements are normal. Ordered hepatitis, HIV, cryoglobulin, ANCA and antiGBM since he reports blood tinged sputum. He will need f/u  with rheumatology. He will need cardiac clearance to be off blood thinners for a renal biopsy (Dr. Sarmiento is following). If repeat UA shows hematuria, will start steroids. No NSAIDs or IV contrast.   - BP is high. Start nifedipine XL 30mg daily (should help with pulm HTN as well). VS are improved. Continue oral lasix. Keep ARB on hold.   - He remains hypoK. Avoid further doses of metolazone. Ordered standing oral KCl 40mEq BID.   - He has mild vitamin D deficiency with mildly elevated PTH for stage IV CKD. Start cholecalciferol 2000 units daily.   - He is iron deficient. He is on iron tablets TID. Hb is stable. Paraprotein workup was negative (but will check beta 2 microglobulin for amyloid). There is no need for DIANA right now.    Thank you for allowing us to participate in this patient's care. We will continue to follow.    Medications:  No current facility-administered medications on file prior to encounter.      Current Outpatient Medications on File Prior to Encounter   Medication Sig Dispense Refill    albuterol (VENTOLIN HFA) 90 mcg/actuation inhaler Ventolin HFA 90 mcg/actuation aerosol inhaler      atorvastatin (LIPITOR) 20 MG tablet atorvastatin 20 mg tablet      clopidogrel (PLAVIX) 75 mg tablet clopidogrel 75 mg tablet      cyanocobalamin (VITAMIN B-12) 1000 MCG tablet Vitamin B-12 1,000 mcg tablet   TK 1 T PO QD      escitalopram oxalate (LEXAPRO) 10 MG tablet escitalopram 10 mg tablet      ferrous sulfate (FEOSOL) 325 mg (65 mg iron) Tab tablet ferrous sulfate 325 mg (65 mg iron) tablet   TK 1 T PO TID      folic acid (FOLVITE) 1 MG tablet folic acid 1 mg tablet   TK 1 T PO D      hydroCHLOROthiazide (HYDRODIURIL) 50 MG tablet hydrochlorothiazide 50 mg tablet      hydrOXYzine pamoate (VISTARIL) 25 MG Cap hydroxyzine pamoate 25 mg capsule      losartan-hydrochlorothiazide 100-25 mg (HYZAAR) 100-25 mg per tablet losartan 100 mg-hydrochlorothiazide 25 mg tablet      omeprazole (PRILOSEC) 20 MG  capsule omeprazole 20 mg capsule,delayed release      ondansetron (ZOFRAN) 4 MG tablet ondansetron HCl 4 mg tablet      oxybutynin (DITROPAN-XL) 5 MG TR24 oxybutynin chloride ER 5 mg tablet,extended release 24 hr   TK 1 T PO D      pantoprazole (PROTONIX) 40 MG tablet pantoprazole 40 mg tablet,delayed release      polyethylene glycol (GAVILYTE-G) 236-22.74-6.74 -5.86 gram suspension GaviLyte-G 236 gram-22.74 gram-6.74 gram-5.86 gram oral solution      potassium chloride SA (K-DUR,KLOR-CON) 20 MEQ tablet potassium chloride ER 20 mEq tablet,extended release(part/cryst)      pregabalin (LYRICA) 150 MG capsule Lyrica 150 mg capsule      sucralfate (CARAFATE) 1 gram tablet sucralfate 1 gram tablet      sulindac (CLINORIL) 200 MG Tab sulindac 200 mg tablet      HYDROcodone-acetaminophen (NORCO) 7.5-325 mg per tablet hydrocodone 7.5 mg-acetaminophen 325 mg tablet      nitroGLYCERIN (NITROSTAT) 0.4 MG SL tablet nitroglycerin 0.4 mg sublingual tablet      traZODone (DESYREL) 50 MG tablet trazodone 50 mg tablet       Scheduled Meds:   atorvastatin  20 mg Oral Daily    budesonide  0.5 mg Nebulization Q12H    carvedilol  6.25 mg Oral BID    cefTRIAXone (ROCEPHIN) IVPB  2 g Intravenous Q24H    clopidogrel  75 mg Oral Daily    doxycycline (VIBRAMYCIN) IVPB  100 mg Intravenous Q12H    enoxaparin  30 mg Subcutaneous Daily    escitalopram oxalate  10 mg Oral Daily    ferrous sulfate  325 mg Oral TID    folic acid  1 mg Oral Daily    furosemide  40 mg Oral Daily    hydrALAZINE  100 mg Oral Q8H    ipratropium  0.5 mg Nebulization Q6H    levalbuterol  1.25 mg Nebulization Q12H    oxybutynin  5 mg Oral Daily    pantoprazole  40 mg Oral Daily    potassium chloride SA  40 mEq Oral Daily    traZODone  50 mg Oral QHS     Continuous Infusions:    PRN Meds:.acetaminophen, albuterol-ipratropium, famotidine, hydrALAZINE, influenza, ondansetron, pneumoc 13-danie conj-dip cr(PF), ramelteon, senna-docusate 8.6-50 mg,  sodium chloride 0.9%    Allergies:  Codeine    Vital Signs:  Temp Readings from Last 3 Encounters:   04/10/19 98.7 °F (37.1 °C) (Oral)   04/03/19 98.1 °F (36.7 °C) (Axillary)       Pulse Readings from Last 3 Encounters:   04/10/19 88   04/03/19 72       BP Readings from Last 3 Encounters:   04/10/19 (!) 179/83   04/03/19 (!) 180/73       Weight:  Wt Readings from Last 3 Encounters:   04/08/19 81.6 kg (179 lb 14.4 oz)   04/02/19 86.2 kg (190 lb)       INS/OUTS:  I/O last 3 completed shifts:  In: 2070.4 [P.O.:1180; I.V.:40.4; IV Piggyback:850]  Out: 4250 [Urine:4250]  No intake/output data recorded.    Physical Exam:  Constitutional: nad, aao x 3  Heart: rrr, no m/r/g, wwp, no edema  Lungs: ctab, no w/r/c, no lb  Abdomen: s/nt/nd, +BS    Results:  Lab Results   Component Value Date     04/10/2019    K 3.2 (L) 04/10/2019    CL 94 (L) 04/10/2019    CO2 33 (H) 04/10/2019    BUN 55 (H) 04/10/2019    CREATININE 2.5 (H) 04/10/2019    CALCIUM 9.7 04/10/2019    ANIONGAP 12 04/10/2019    ESTGFRAFRICA 29 (A) 04/10/2019    EGFRNONAA 25 (A) 04/10/2019       Lab Results   Component Value Date    CALCIUM 9.7 04/10/2019    PHOS 4.3 04/10/2019       Recent Labs   Lab 04/10/19  0427   WBC 11.00   RBC 3.64*   HGB 9.6*   HCT 29.6*   *   MCV 81*   MCH 26.3*   MCHC 32.4       I have personally reviewed pertinent radiological imaging and reports.    Mark Sanders MD MPH  [facility] [address] [address]  [phone] (p)  [phone] (f)

## 2019-04-10 NOTE — PLAN OF CARE
Problem: SLP Goal  Goal: SLP Goal    1) MBSS--MET  NEW: Consume recommended diet of mechanical soft (IDDSI-Minced and Moist) textures and nectar thick liquids with no overt s/s penetration/aspiration  2) Demonstrate use of compensatory swallow strategies/aspriation precautions with SPV  3) Perform CTAR, effortful swallow, Shaker and Mendelsohn with MIN cues       Outcome: Ongoing (interventions implemented as appropriate)  Pt instructed in aspiration precautions, swallow techniques and exercises. Assessed pt's tolerance of thin liquid presented via tspn and intake of ice chips. Pt demonstrated understanding of instruction. He was able to perform swallow exercises, and posture modifications upon oral intake, given moderate assist/cue; expressed understanding of aspiration precautions and need for diet modifications of nectar thick liquids.

## 2019-04-10 NOTE — NURSING
Pt states that he's having heaviness in his chest. He states that he's having no pain just heaviness. Np Le notified. Will continue to monitor.

## 2019-04-10 NOTE — PROGRESS NOTES
Progress Note  Hospital Medicine  Patient Name:Duc Spears  MRN:  [id number]  Patient Class: IP- Inpatient  Admit Date: 4/3/2019  Length of Stay: 7 days  Expected Discharge Date:   Attending Physician: Mimi Landa MD  Primary Care Provider:  Primary Doctor No    SUBJECTIVE:     Principal Problem: Acute on chronic respiratory failure with hypoxia  Initial history of present illness: Mr. Spears is a 75yo M with a PMH of HTN, Chronic Respiratory failure O2 Dependent, and CAD. He presented to [facility] with c/o Dyspnea. It started 2 days ago and got progressively worst. On arrival to [facility], he was in respiratory distress and hypoxic. He was found to be in acute CHF and given nebulizers, high flow 02, and a total of 80mg IV Lasix. He was also found to have COSTA. He was transferred to [facility] for Nephrology services. On arrival to ICU, he was in respiratory distress and hypoxic. A 1L bag of IV NS was in progress on his arrival, and about 500cc was left in the bag. The infusion was stopped. Bipap therapy was initiated and he was given another 40mg IV lasix for pulmonary congestion. eICU physician ordered nebulizers. A repeat CXR was done and was essentially unchanged from the one earlier done at [facility]. After initiateion of Bipap and a nebulizer treatment, he reports that he is starting to breath better. On Bipap, his SPO2 improved to 90%, up from 70's.     PMH/PSH/SH/FH/Meds: reviewed.    Symptoms/Review of Systems:  Patient seen and examined.  Patient experienced substernal chest pain this morning. Feeling better, sitting in chair.  Earlier had a mild nosebleed which has stopped.  On 4 liters/minute oxygen via nasal cannula.  Diet:  Soft mechanical nectar thick  Activity level:  Up with assist   Pain:  Patient reports no pain.       OBJECTIVE:   Vital Signs (Most Recent):      Temp: 98.7 °F (37.1 °C) (04/10/19 0734)  Pulse: 88 (04/10/19 0734)  Resp: 19 (04/10/19 0734)  BP: (!) 179/83 (04/10/19 0734)  SpO2: (!) 94  % (04/10/19 0734)       Vital Signs Range (Last 24H):  Temp:  [97.5 °F (36.4 °C)-98.8 °F (37.1 °C)]   Pulse:  [80-93]   Resp:  [16-20]   BP: (145-213)/()   SpO2:  [92 %-97 %]     Weight: 81.6 kg (179 lb 14.4 oz)  Body mass index is 27.35 kg/m².    Intake/Output Summary (Last 24 hours) at 4/10/2019 0942  Last data filed at 4/10/2019 0600  Gross per 24 hour   Intake 1730.38 ml   Output 3600 ml   Net -1869.62 ml     Physical Examination:  Constitutional: He is oriented to person, place, and time.   HENT:   Head: Normocephalic.   Eyes: Pupils are equal, round, and reactive to light.   Neck: Normal range of motion. Neck supple. No JVD present. No tracheal deviation present.   Cardiovascular: Normal rate, regular rhythm, normal heart sounds and intact distal pulses.   Pulmonary/Chest:Diffuse rhonchi bilaterally   Abdominal: Soft. Bowel sounds are normal. He exhibits no distension. There is no tenderness.   Musculoskeletal: Normal range of motion. He exhibits no edema.   Neurological: He is alert and oriented to person, place, and time. No cranial nerve deficit.   Skin: Skin is warm, dry and intact. Capillary refill takes less than 2 seconds.   CRANIAL NERVES   CN III, IV, VI   Pupils are equal, round, and reactive to light.    CBC:  Recent Labs   Lab 04/08/19  0430 04/09/19  0408 04/10/19  0427   WBC 8.10 9.50 11.00   RBC 3.55* 3.62* 3.64*   HGB 9.2* 9.2* 9.6*   HCT 28.8* 29.5* 29.6*   * 139* 149*   MCV 81* 82 81*   MCH 26.0* 25.5* 26.3*   MCHC 32.0 31.2* 32.4   BMP  Recent Labs   Lab 04/07/19  0337 04/08/19  0430 04/09/19  0408 04/10/19  0427   * 106 111* 111*   * 143 141 139   K 3.2* 3.2* 3.4* 3.2*    100 98 94*   CO2 31* 33* 33* 33*   BUN 65* 55* 56* 55*   CREATININE 2.4* 2.5* 2.5* 2.5*   CALCIUM 9.3 9.3 9.3 9.7   MG 1.8  --   --   --       Diagnostic Results:  Microbiology Results (last 7 days)     Procedure Component Value Units Date/Time    Blood culture [[id number]] Collected:   04/03/19 0343    Order Status:  Completed Specimen:  Blood Updated:  04/08/19 1212     Blood Culture, Routine No growth after 5 days.    Culture, Respiratory with Gram Stain [[id number]] Collected:  04/04/19 1333    Order Status:  Completed Specimen:  Respiratory from Sputum, Expectorated Updated:  04/08/19 1035     Respiratory Culture Normal respiratory lester     Respiratory Culture No S aureus or Pseudomonas isolated.     Gram Stain (Respiratory) <10 epithelial cells per low power field.     Gram Stain (Respiratory) Rare WBC's     Gram Stain (Respiratory) Moderate Gram positive cocci     Gram Stain (Respiratory) Few yeast         Renal US:  Features of medical renal disease.  No hydronephrosis. Cholelithiasis.    CXR:  1. Bilateral pleural effusions between small to moderate in size.  2. Bilateral airspace disease which could reflect atelectasis, pneumonia, pulmonary edema, acute lung injury/ARDS.  3. Cardiomegaly.    ECHO:  · Concentric left ventricular hypertrophy.  · Normal left ventricular systolic function. The estimated ejection fraction is 58%  · Grade III (severe) left ventricular diastolic dysfunction consistent with restrictive physiology.  · Elevated left atrial pressure.  · Low normal right ventricular systolic function.  · Moderate left atrial enlargement.  · Mild right atrial enlargement.  · Mild mitral regurgitation.  · Mild tricuspid regurgitation.  · Elevated central venous pressure (15 mm Hg).  · The estimated PA systolic pressure is 43 mm Hg  · Pulmonary hypertension present.  · There is a moderate left pleural effusion.  · Pericardial effusion.    CXR: Bilateral airspace disease and pleural effusions, similar in extent.       MBSS: Moderate dysphagia.        CXR; Continued bilateral perihilar and lower lobe infiltrates.  Borderline heart size.  Atherosclerosis.       Bilateral leg venous doppler scan: No evidence of deep venous thrombosis in either lower extremity.       CXR: Persistent features  of congestive failure accompanied by layering bilateral pleural effusions.  Mild improvement in aeration of the lung bases noted.    CXR: Mild cardiomegaly.  Atherosclerosis.  Bilateral lower lobe infiltrates and probable trace bilateral pleural effusions.    Assessment/Plan:   * Acute on chronic respiratory failure with hypoxia  Continue supplemental oxygen to maintain pulse ox above 92%.  Use BiPAP noninvasive ventilatory therapy as needed.  On IV Rocephin and doxycycline.  Follow pulmonary recommendations.  Continue beta 2 agonist bronchodilator nebulization treatments.     Acute on chronic diastolic congestive heart failure exacerbation  Follow Cardiology recommendations.  Echo results reviewed.  On PO Lasix 40 mg q.day.   Hold chronic ARB due to COSTA.  Continue Coreg - titrate as needed  Monitor on telemetry.    Chest pain  Supplemental O2 via nasal canula; titrate O2 saturation to >92%.  Serial Cardiac enzymes × 3.  Tele-monitoring.   Na restriction <2g/d.     COSTA (acute kidney injury) - stabilizing  Hold ARB, and Sulindac for now.  Follow Nephrology recommendation.  Renal ultrasound results reviewed.  Strict I&O  Avoid NSAIDs/Nephrotoxins  Renal dose medications    CAD (coronary artery disease)  Continue statin and plavix  Not on chronic ASA     Essential hypertension  Chronic/Controlled. Continue chronic medications, adjusting as needed.  Started on nifedipine XL 30 mg daily for pulmonary hypertension    Nonsustained ventricular tachycardia, asymptomatic  Telemonitoring.  Continue serial cardiac enzymes.  Follow Cardiology recommendation    Physical deconditioning  PT/OT.    Hyperkalemia  Replete potassium chloride.  Follow BMP.    Mixed connective tissue disorder  Patient counseled and advised to follow up with rheumatologist upon discharge.      CAROLINE positive while anti DNA negative. + anti SM/RNP.  Follow hepatitis panel, HIV,     cryoglobulin, ANCA and anti GBM.  Nephrology team considering renal  biopsy    Patient declines skilled nursing facility placement.  Patient would like to go home with home health and home physical therapy.       VTE Risk Mitigation (From admission, onward)        Ordered     enoxaparin injection 30 mg  Daily      04/08/19 1259     IP VTE HIGH RISK PATIENT  Once      04/03/19 0252     Place TIANA hose  Until discontinued      04/03/19 0252        Mimi Landa MD  Department of Hospital Medicine   [facility]

## 2019-04-10 NOTE — PLAN OF CARE
Problem: Adult Inpatient Plan of Care  Goal: Plan of Care Review  Outcome: Ongoing (interventions implemented as appropriate)  Pt sleeping through the night, in NAD. Fall and safety precautions maintained. Evening medications tolerated well. Bed in lowest position, call light in reach. Will continue to monitor.   04/10/19 7523   Plan of Care Review   Plan of Care Reviewed With patient

## 2019-04-10 NOTE — PROGRESS NOTES
Progress Note  PULMONARY    Admit Date: 4/3/2019   04/10/2019      SUBJECTIVE:     April 4th, alert, on niv and tolerating well.    April 5, choked on breakfast, less sob, off niv now.  April 8, now on floor, progressing - better- no c/o  April 9th, no complaints  April 10th, stable, breathing fairly well.  Hoping outpt am.      PFSH and Allergies reviewed.    OBJECTIVE:     Vitals (Most recent):  Vitals:    04/10/19 1235   BP: (!) 189/84   Pulse: 85   Resp: 19   Temp: 97.5 °F (36.4 °C)       Vitals (24 hour range):  Temp:  [97.5 °F (36.4 °C)-98.8 °F (37.1 °C)]   Pulse:  [80-93]   Resp:  [16-20]   BP: (145-213)/()   SpO2:  [92 %-97 %]       Intake/Output Summary (Last 24 hours) at 4/10/2019 1301  Last data filed at 4/10/2019 0600  Gross per 24 hour   Intake 1490.38 ml   Output 2600 ml   Net -1109.62 ml          Physical Exam:  The patient's neuro status (alertness,orientation,cognitive function,motor skills,), pharyngeal exam (oral lesions, hygiene, abn dentition,), Neck (jvd,mass,thyroid,nodes in neck and above/below clavicle),RESPIRATORY(symmetry,effort,fremitus,percussion,auscultation),  Cor(rhythm,heart tones including gallops,perfusion,edema)ABD(distention,hepatic&splenomegaly,tenderness,masses), Skin(rash,cyanosis),Psyc(affect,judgement,).  Exam negative except for these pertinent findings:    Alert,nc, no distress, diffuse rales, no sign edema, symmetric, nl fremitus and percussion, distent cor    Radiographs reviewed: view by direct vision  4/5/19 cw pulm edeam and copd- not much changes- 4/6 stil dense lower lungs.      No results found for this or any previous visit.]    Labs     Recent Labs   Lab 04/10/19  0427   WBC 11.00   HGB 9.6*   HCT 29.6*   *     Recent Labs   Lab 04/10/19  0427 04/10/19  0430     --    K 3.2*  --    CL 94*  --    CO2 33*  --    BUN 55*  --    CREATININE 2.5*  --    *  --    CALCIUM 9.7  --    PHOS 4.3  --    TROPONINI  --  0.048*   No results for  input(s): PH, PCO2, PO2, HCO3 in the last 24 hours.  Microbiology Results (last 7 days)     Procedure Component Value Units Date/Time    Blood culture [[id number]] Collected:  04/03/19 0343    Order Status:  Completed Specimen:  Blood Updated:  04/08/19 1212     Blood Culture, Routine No growth after 5 days.    Culture, Respiratory with Gram Stain [[id number]] Collected:  04/04/19 1333    Order Status:  Completed Specimen:  Respiratory from Sputum, Expectorated Updated:  04/08/19 1035     Respiratory Culture Normal respiratory lester     Respiratory Culture No S aureus or Pseudomonas isolated.     Gram Stain (Respiratory) <10 epithelial cells per low power field.     Gram Stain (Respiratory) Rare WBC's     Gram Stain (Respiratory) Moderate Gram positive cocci     Gram Stain (Respiratory) Few yeast          Impression:  Active Hospital Problems    Diagnosis  POA    *Acute on chronic respiratory failure with hypoxia [J96.21]  Yes    Aspiration into airway [T17.908A]  No    CAD (coronary artery disease) [I25.10]  Yes    Elevated d-dimer [R79.89]  Yes    COPD exacerbation [J44.1]  Yes    Calcification of aorta [I70.0]  Yes    Acute pulmonary edema [J81.0]  Yes    Acute diastolic congestive heart failure [I50.31]  Yes    Essential hypertension [I10]  Yes    COSTA (acute kidney injury) [N17.9]  Yes      Resolved Hospital Problems   No resolved problems to display.               Plan:   April 4 - only 800cc negative on lasix 80q8- still in pulm edema, echo diastolic chf.  Renal would seem biggest issue.  Lasix drip or dialysis.  resp compensated but marginal..    Will order lasix drip.  Likely needs dialysis acutely.  Renal is seeing.    April 5 , I/o -2200 on lasix drip 10/hr, aspirated some breakfast but breathing better.  Creat better on lasix drip- continue drip.  Speech eval  gpc in sputum, improved but slow.  Question if aspiration significant?  procal was 0.12 on 3rd.    April 8, lytes good but cxr 4/6 still  bilat lower lung symmetric dz.     F/u cxr and consider ct if not clearing?    Need to get off lasix drip- will stop am if cxr cleared..    April 9, cxr slowly better, needs  effective diuretic regimen.  He still has edema in the lung and also pleural effusions bilaterally on x-ray.    Would be reasonable to consider outpatient management and office follow-up in a week.  Monitoring weights would be good.  Home health would be needed.  Patient is expected to be marginal given his edema problems/fluid retention    April 10th, patient has aspiration tendency and is being managed by speech with techniques.  Patient is off the Lasix drip.  Fluid balance was negative yesterday.  Respiratory status seems to be improving.  Outpatient therapy and follow-up would be considered soon                .

## 2019-04-10 NOTE — PLAN OF CARE
04/10/19 0706   Patient Assessment/Suction   Level of Consciousness (AVPU) alert   All Lung Fields Breath Sounds diminished   PRE-TX-O2   O2 Device (Oxygen Therapy) nasal cannula   $ Is the patient on Low Flow Oxygen? Yes   Flow (L/min) 4   Oxygen Concentration (%) 36   SpO2 96 %   Pulse Oximetry Type Intermittent   $ Pulse Oximetry - Multiple Charge Pulse Oximetry - Multiple   Pulse 86   Resp 18   Aerosol Therapy   $ Aerosol Therapy Charges Aerosol Treatment   Respiratory Treatment Status (SVN) given   Treatment Route (SVN) mask   Patient Position (SVN) HOB elevated   Post Treatment Assessment (SVN) breath sounds unchanged   Signs of Intolerance (SVN) none   Breath Sounds Post-Respiratory Treatment   Throughout All Fields Post-Treatment All Fields   Throughout All Fields Post-Treatment no change   Post-treatment Heart Rate (beats/min) 88   Post-treatment Resp Rate (breaths/min) 18   Ready to Wean/Extubation Screen   FIO2<=50 (chart decimal) 0.36

## 2019-04-10 NOTE — PROGRESS NOTES
04/09/19 1938   Patient Assessment/Suction   Level of Consciousness (AVPU) alert   Respiratory Effort Normal;Unlabored   All Lung Fields Breath Sounds coarse;diminished   PRE-TX-O2   O2 Device (Oxygen Therapy) nasal cannula   Flow (L/min) 4   Oxygen Concentration (%) 36   SpO2 95 %   Pulse 83   Resp 20   Aerosol Therapy   $ Aerosol Therapy Charges Aerosol Treatment   Respiratory Treatment Status (SVN) given   Treatment Route (SVN) mask   Patient Position (SVN) sitting in chair   Post Treatment Assessment (SVN) breath sounds unchanged   Signs of Intolerance (SVN) none   Breath Sounds Post-Respiratory Treatment   Throughout All Fields Post-Treatment All Fields   Throughout All Fields Post-Treatment no change   Post-treatment Heart Rate (beats/min) 93   Post-treatment Resp Rate (breaths/min) 18   Ready to Wean/Extubation Screen   FIO2<=50 (chart decimal) 0.36

## 2019-04-10 NOTE — PLAN OF CARE
Problem: SLP Goal  Goal: SLP Goal    1) MBSS--MET  NEW: Consume recommended diet of mechanical soft (IDDSI-Minced and Moist) textures and nectar thick liquids with no overt s/s penetration/aspiration  2) Demonstrate use of compensatory swallow strategies/aspriation precautions with SPV  3) Perform CTAR, effortful swallow, Shaker and Mendelsohn with MIN cues         Participated in dysphagia therapy with good effort. Continue POC. Continue ST upon discharge.     Roseanna Dixon MS, CCC/SLP 4/10/2019

## 2019-04-10 NOTE — PROGRESS NOTES
Progress Note  Cardiology    Admit Date: 4/3/2019   LOS: 7 days     Follow-up For: ARF/CRF; CHF - volume overload    Scheduled Meds:   atorvastatin  20 mg Oral Daily    budesonide  0.5 mg Nebulization Q12H    carvedilol  6.25 mg Oral BID    cefTRIAXone (ROCEPHIN) IVPB  2 g Intravenous Q24H    clopidogrel  75 mg Oral Daily    doxycycline (VIBRAMYCIN) IVPB  100 mg Intravenous Q12H    enoxaparin  30 mg Subcutaneous Daily    escitalopram oxalate  10 mg Oral Daily    ferrous sulfate  325 mg Oral TID    folic acid  1 mg Oral Daily    furosemide  40 mg Oral Daily    hydrALAZINE  100 mg Oral Q8H    ipratropium  0.5 mg Nebulization Q6H    levalbuterol  1.25 mg Nebulization Q12H    NIFEdipine  30 mg Oral Daily    oxybutynin  5 mg Oral Daily    pantoprazole  40 mg Oral Daily    potassium chloride SA  40 mEq Oral BID    traZODone  50 mg Oral QHS    vitamin D  2,000 Units Oral Daily     Continuous Infusions:  PRN Meds:acetaminophen, albuterol-ipratropium, famotidine, hydrALAZINE, influenza, ondansetron, pneumoc 13-danie conj-dip cr(PF), ramelteon, senna-docusate 8.6-50 mg, sodium chloride 0.9%    Review of patient's allergies indicates:   Allergen Reactions    Codeine Nausea And Vomiting       SUBJECTIVE:     Interval History: Patient has complaints of hemoptysis ~ 4 am. Had mild chest discomfort ~ 5 am.  Asymptomatic since.    Review of Systems  Respiratory: positive for cough, hemoptysis and sputum, negative for wheezing  Cardiovascular: positive for dyspnea, negative for orthopnea, palpitations and paroxysmal nocturnal dyspnea    OBJECTIVE:     Vital Signs (Most Recent)  Temp: 98.7 °F (37.1 °C) (04/10/19 0734)  Pulse: 88 (04/10/19 0734)  Resp: 19 (04/10/19 0734)  BP: (!) 179/83 (04/10/19 0734)  SpO2: (!) 94 % (04/10/19 0734)    Vital Signs Range (Last 24H):  Temp:  [97.5 °F (36.4 °C)-98.8 °F (37.1 °C)]   Pulse:  [80-93]   Resp:  [16-20]   BP: (145-213)/()   SpO2:  [92 %-97 %]       Physical  Exam:  Neck: no carotid bruit, no JVD and supple, symmetrical, trachea midline  Lungs: clear to auscultation bilaterally, normal respiratory effort  Heart: regular rate and rhythm, S1, S2 normal, no murmur, click, rub or gallop  Abdomen: soft, non-tender; bowel sounds normal; no masses,  no organomegaly  Extremities: Extremities normal, atraumatic, no cyanosis, clubbing, or edema    Recent Results (from the past 24 hour(s))   Vitamin D    Collection Time: 04/09/19  4:18 PM   Result Value Ref Range    Vit D, 25-Hydroxy 26 (L) 30 - 96 ng/mL   Protein, Quantitative, Urine Random    Collection Time: 04/09/19  4:52 PM   Result Value Ref Range    Protein, Urine Random 187 (H) 0 - 15 mg/dL   Creatinine, urine, random    Collection Time: 04/09/19  4:52 PM   Result Value Ref Range    Creatinine, Random Ur 37.0 23.0 - 375.0 mg/dL   CBC auto differential    Collection Time: 04/10/19  4:27 AM   Result Value Ref Range    WBC 11.00 3.90 - 12.70 K/uL    RBC 3.64 (L) 4.60 - 6.20 M/uL    Hemoglobin 9.6 (L) 14.0 - 18.0 g/dL    Hematocrit 29.6 (L) 40.0 - 54.0 %    MCV 81 (L) 82 - 98 fL    MCH 26.3 (L) 27.0 - 31.0 pg    MCHC 32.4 32.0 - 36.0 g/dL    RDW 19.6 (H) 11.5 - 14.5 %    Platelets 149 (L) 150 - 350 K/uL    MPV 9.3 9.2 - 12.9 fL    Gran # (ANC) 9.0 (H) 1.8 - 7.7 K/uL    Lymph # 1.0 1.0 - 4.8 K/uL    Mono # 0.9 0.3 - 1.0 K/uL    Eos # 0.1 0.0 - 0.5 K/uL    Baso # 0.00 0.00 - 0.20 K/uL    Gran% 81.8 (H) 38.0 - 73.0 %    Lymph% 9.0 (L) 18.0 - 48.0 %    Mono% 7.9 4.0 - 15.0 %    Eosinophil% 1.1 0.0 - 8.0 %    Basophil% 0.2 0.0 - 1.9 %    Differential Method Automated    Phosphorus - if not done in ED    Collection Time: 04/10/19  4:27 AM   Result Value Ref Range    Phosphorus 4.3 2.7 - 4.5 mg/dL   Basic metabolic panel     Collection Time: 04/10/19  4:27 AM   Result Value Ref Range    Sodium 139 136 - 145 mmol/L    Potassium 3.2 (L) 3.5 - 5.1 mmol/L    Chloride 94 (L) 95 - 110 mmol/L    CO2 33 (H) 23 - 29 mmol/L    Glucose 111 (H)  70 - 110 mg/dL    BUN, Bld 55 (H) 8 - 23 mg/dL    Creatinine 2.5 (H) 0.5 - 1.4 mg/dL    Calcium 9.7 8.7 - 10.5 mg/dL    Anion Gap 12 8 - 16 mmol/L    eGFR if African American 29 (A) >60 mL/min/1.73 m^2    eGFR if non African American 25 (A) >60 mL/min/1.73 m^2   Troponin I    Collection Time: 04/10/19  4:30 AM   Result Value Ref Range    Troponin I 0.048 (H) 0.000 - 0.026 ng/mL       Diagnostic Results:  Labs: Reviewed  ECG: Reviewed  X-Ray: Reviewed    ASSESSMENT/PLAN:     BP still elevated. Amlodipine started today. BUN/ creat have not worsened with diuretic

## 2019-04-10 NOTE — PLAN OF CARE
Problem: Physical Therapy Goal  Goal: Physical Therapy Goal  Goals to be met by: 2019     Patient will increase functional independence with mobility by performin. Supine to sit with Stand-by Assistance  2. Sit to stand transfer with Stand-by Assistance  3. Bed to chair transfer with Stand-by Assistance   4. Gait  x 250 feet with Contact Guard Assistance using Rolling Walker.   5. Lower extremity exercise program x20 reps    Outcome: Ongoing (interventions implemented as appropriate)  Ambulated with rw and CGA with O2 attached.

## 2019-04-10 NOTE — PT/OT/SLP PROGRESS
Speech Language Pathology Treatment    Patient Name:  Duc Spears   MRN:  [id number]  Admitting Diagnosis: Acute on chronic respiratory failure with hypoxia    Recommendations:                 General Recommendations:  Dysphagia therapy  Diet recommendations:  Mechanical soft, Liquid Diet Level: Nectar Thick   Aspiration Precautions: 1 bite/sip at a time, Assistance with thickening liquids, Chin tuck, Eliminate distractions, Feed only when awake/alert, No straws, Remain upright 30 minutes post meal and Small bites/sips   General Precautions: Standard, aspiration  Communication strategies:  none    Subjective     Pt see seated in chair at bedside  Patient goals: Per nurse pt has been requesting liquid without thickener      Objective:     Has the patient been evaluated by SLP for swallowing?   Yes  Keep patient NPO? No   Current Respiratory Status: nasal cannula      Pt instructed in aspiration precautions, swallow techniques and exercises. Assessed pt's tolerance of thin liquid presented via tspn and intake of ice chips. Pt demonstrated understanding of instruction. He was able to perform swallow exercises, and posture modifications upon oral intake, given moderate assist/cue; expressed understanding of aspiration precautions and need for diet modifications of nectar thick liquids.   Pt given written instruction of swallow guidelines and exercises.      Assessment:     Duc Spears is a 74 y.o. male with an SLP diagnosis of Dysphagia.  He presents with silent aspiration per MBSS    Goals:   Multidisciplinary Problems     SLP Goals        Problem: SLP Goal    Goal Priority Disciplines Outcome   SLP Goal     SLP Ongoing (interventions implemented as appropriate)   Description:    1) MBSS--MET  NEW: Consume recommended diet of mechanical soft (IDDSI-Minced and Moist) textures and nectar thick liquids with no overt s/s penetration/aspiration  2) Demonstrate use of compensatory swallow  "strategies/aspriation precautions with SPV  3) Perform CTAR, effortful swallow, Shaker and Mendelsohn with MIN cues                        Plan:     · Patient to be seen:  5 x/week   · Plan of Care expires:  04/27/19  · Plan of Care reviewed with:  patient   · SLP Follow-Up:  Yes       Discharge recommendations:  home health speech therapy   Barriers to Discharge:  None    Time Tracking:     SLP Treatment Date:   04/09/19  Speech Start Time:  1300  Speech Stop Time:  1345     Speech Total Time (min):  45 min    Billable Minutes: Treatment Swallowing Dysfunction 30"    Sveta Colvin CCC-SLP  04/09/2019  "

## 2019-04-10 NOTE — NURSING
Pt just had a nose bleed by unknown cause. Pressure applied to nose to stop the bleed/ Pt in NAD. Will continue to monitor.

## 2019-04-10 NOTE — PT/OT/SLP PROGRESS
Physical Therapy Treatment    Patient Name:  Duc Spears   MRN:  [id number]    Recommendations:     Discharge Recommendations:  home health PT   Discharge Equipment Recommendations: walker, rolling   Barriers to discharge: None    Assessment:     Duc Spears is a 74 y.o. male admitted with a medical diagnosis of Acute on chronic respiratory failure with hypoxia.  He presents with the following impairments/functional limitations:  weakness, impaired endurance, impaired functional mobilty, impaired cardiopulmonary response to activity, impaired self care skills .    Rehab Prognosis: Fair; patient would benefit from acute skilled PT services to address these deficits and reach maximum level of function.    Recent Surgery: Procedure(s) (LRB):  Bronchoscopy - need to cleanout, in icu best.  marginal pt (N/A)      Plan:     During this hospitalization, patient to be seen 6 x/week to address the identified rehab impairments via therapeutic activities, gait training, therapeutic exercises and progress toward the following goals:    · Plan of Care Expires:  04/26/19    Subjective     Chief Complaint: pain with movement  Patient/Family Comments/goals: to return home  Pain/Comfort:  · Pain Rating 1: other (see comments)(did not rate)  · Location - Orientation 1: generalized  · Location 1: chest  · Pain Addressed 1: Reposition, Nurse notified(releived by movement)      Objective:     Communicated with nurse Valenzuela prior to session.  Patient found supine with telemetry, peripheral IV, bed alarm, oxygen, castaneda catheter upon PT entry to room.     General Precautions: Standard, fall   Orthopedic Precautions:N/A   Braces: N/A     Functional Mobility:  · Bed Mobility:     · Rolling Right: contact guard assistance  · Supine to Sit: contact guard assistance  · Transfers:     · Sit to Stand:  contact guard assistance with rolling walker  · Gait: 120' with rw and CGA with O2 attached.      AM-PAC 6 CLICK MOBILITY           Therapeutic Activities and Exercises:   Returned to sit up in chair at bedside.    Patient left up in chair with all lines intact, call button in reach, chair alarm on and nurse Bianca notified..    GOALS:   Multidisciplinary Problems     Physical Therapy Goals        Problem: Physical Therapy Goal    Goal Priority Disciplines Outcome Goal Variances Interventions   Physical Therapy Goal     PT, PT/OT Ongoing (interventions implemented as appropriate)     Description:  Goals to be met by: 2019     Patient will increase functional independence with mobility by performin. Supine to sit with Stand-by Assistance  2. Sit to stand transfer with Stand-by Assistance  3. Bed to chair transfer with Stand-by Assistance   4. Gait  x 250 feet with Contact Guard Assistance using Rolling Walker.   5. Lower extremity exercise program x20 reps                     Time Tracking:     PT Received On: 04/10/19  PT Start Time: 0900     PT Stop Time: 914  PT Total Time (min): 14 min     Billable Minutes: Gait Training 14min    Treatment Type: Treatment  PT/PTA: PTA     PTA Visit Number: 2     Zee Retana PTA  04/10/2019

## 2019-04-10 NOTE — PT/OT/SLP PROGRESS
Speech Language Pathology Treatment    Patient Name:  Duc Spears   MRN:  [id number]  Admitting Diagnosis: Acute on chronic respiratory failure with hypoxia    Recommendations:                 General Recommendations:  Dysphagia therapy  Diet recommendations:  Mechanical soft(IDDSI-Minced and Moist), Nectar Thick   Aspiration Precautions: 1 bite/sip at a time, Assistance with thickening liquids, Avoid talking while eating, Double swallow with each bite/sip, Meds crushed in puree, No straws, Small bites/sips and Wear oxygen during intake   General Precautions: Standard, aspiration  Communication strategies:  none    Subjective   States he has a poor appetite. No c/o    Objective:   Pt found dup in chair with carton of thin cranberry juice, with straw, at bedside. Pt educated, again, re: use of nectar thick liquids, no straws and risk of noncompliance. He verbalized understanding. Graduated cup at bedside. Instructed pt on how to accurately thicken liquids. He performed Mendelsohn with MIN verbal cues with ~5 second hold. Demonstrated use of SGS given SPV.     Has the patient been evaluated by SLP for swallowing?   Yes  Keep patient NPO? No   Current Respiratory Status: nasal cannula      Assessment:     Duc Spears is a 74 y.o. male with an SLP diagnosis of Dysphagia.  He presents with good effort. Needs continued education re: nectar thick liquid and aspiration precautions. .    Goals:   Multidisciplinary Problems     SLP Goals        Problem: SLP Goal    Goal Priority Disciplines Outcome   SLP Goal     SLP Ongoing (interventions implemented as appropriate)   Description:    1) MBSS--MET  NEW: Consume recommended diet of mechanical soft (IDDSI-Minced and Moist) textures and nectar thick liquids with no overt s/s penetration/aspiration  2) Demonstrate use of compensatory swallow strategies/aspriation precautions with SPV  3) Perform CTAR, effortful swallow, Shaker and Mendelsohn with MIN cues                         Plan:     · Patient to be seen:  5 x/week   · Plan of Care expires:  04/27/19  · Plan of Care reviewed with:  patient   · SLP Follow-Up:  Yes       Discharge recommendations:  home health speech therapy   Barriers to Discharge:  None    Time Tracking:     SLP Treatment Date:   04/10/19  Speech Start Time:  1502  Speech Stop Time:  1512     Speech Total Time (min):  10 min    Billable Minutes: Treatment Swallowing Dysfunction 10 and Total Time 10    Roseanna Dixon CCC-SLP  04/10/2019

## 2019-04-10 NOTE — PLAN OF CARE
Problem: Adult Inpatient Plan of Care  Goal: Plan of Care Review  Outcome: Ongoing (interventions implemented as appropriate)  Pt remained free from injury/falls this shift. VSS- no signs of any distress. Tele NSR. POC reviewed with pt. Pt repositioned independently, skin intact. Pt denied any pain this shift. Bed locked in lowest position, SR upx2, call light within reach. Will continue to monitor.

## 2019-04-11 LAB
ANION GAP SERPL CALC-SCNC: 10 MMOL/L (ref 8–16)
BACTERIA #/AREA URNS HPF: ABNORMAL /HPF
BASOPHILS # BLD AUTO: 0 K/UL (ref 0–0.2)
BASOPHILS NFR BLD: 0.3 % (ref 0–1.9)
BILIRUB UR QL STRIP: NEGATIVE
BUN SERPL-MCNC: 59 MG/DL (ref 8–23)
CALCIUM SERPL-MCNC: 9.3 MG/DL (ref 8.7–10.5)
CHLORIDE SERPL-SCNC: 96 MMOL/L (ref 95–110)
CLARITY UR: CLEAR
CO2 SERPL-SCNC: 34 MMOL/L (ref 23–29)
COLOR UR: YELLOW
CREAT SERPL-MCNC: 2.5 MG/DL (ref 0.5–1.4)
DIFFERENTIAL METHOD: ABNORMAL
EOSINOPHIL # BLD AUTO: 0.1 K/UL (ref 0–0.5)
EOSINOPHIL NFR BLD: 0.6 % (ref 0–8)
ERYTHROCYTE [DISTWIDTH] IN BLOOD BY AUTOMATED COUNT: 19.3 % (ref 11.5–14.5)
EST. GFR  (AFRICAN AMERICAN): 29 ML/MIN/1.73 M^2
EST. GFR  (NON AFRICAN AMERICAN): 25 ML/MIN/1.73 M^2
GLUCOSE SERPL-MCNC: 109 MG/DL (ref 70–110)
GLUCOSE UR QL STRIP: NEGATIVE
HAV IGM SERPL QL IA: ABNORMAL
HBV CORE IGM SERPL QL IA: NEGATIVE
HBV SURFACE AG SERPL QL IA: NEGATIVE
HCT VFR BLD AUTO: 26.1 % (ref 40–54)
HCV AB SERPL QL IA: NEGATIVE
HGB BLD-MCNC: 8.2 G/DL (ref 14–18)
HGB UR QL STRIP: ABNORMAL
HIV1+2 IGG SERPL QL IA.RAPID: NEGATIVE
HYALINE CASTS #/AREA URNS LPF: 0 /LPF
KETONES UR QL STRIP: NEGATIVE
LEUKOCYTE ESTERASE UR QL STRIP: NEGATIVE
LYMPHOCYTES # BLD AUTO: 1.4 K/UL (ref 1–4.8)
LYMPHOCYTES NFR BLD: 14 % (ref 18–48)
MCH RBC QN AUTO: 25.9 PG (ref 27–31)
MCHC RBC AUTO-ENTMCNC: 31.5 G/DL (ref 32–36)
MCV RBC AUTO: 82 FL (ref 82–98)
MICROSCOPIC COMMENT: ABNORMAL
MONOCYTES # BLD AUTO: 1 K/UL (ref 0.3–1)
MONOCYTES NFR BLD: 10.2 % (ref 4–15)
NEUTROPHILS # BLD AUTO: 7.4 K/UL (ref 1.8–7.7)
NEUTROPHILS NFR BLD: 74.9 % (ref 38–73)
NITRITE UR QL STRIP: NEGATIVE
PH UR STRIP: 6 [PH] (ref 5–8)
PHOSPHATE SERPL-MCNC: 4.1 MG/DL (ref 2.7–4.5)
PLATELET # BLD AUTO: 150 K/UL (ref 150–350)
PMV BLD AUTO: 9.4 FL (ref 9.2–12.9)
POTASSIUM SERPL-SCNC: 3.6 MMOL/L (ref 3.5–5.1)
PROT UR QL STRIP: ABNORMAL
RBC # BLD AUTO: 3.17 M/UL (ref 4.6–6.2)
RBC #/AREA URNS HPF: 15 /HPF (ref 0–4)
SODIUM SERPL-SCNC: 140 MMOL/L (ref 136–145)
SP GR UR STRIP: 1.01 (ref 1–1.03)
TROPONIN I SERPL DL<=0.01 NG/ML-MCNC: 0.09 NG/ML (ref 0–0.03)
TROPONIN I SERPL DL<=0.01 NG/ML-MCNC: 0.09 NG/ML (ref 0–0.03)
TROPONIN I SERPL DL<=0.01 NG/ML-MCNC: 0.12 NG/ML (ref 0–0.03)
TROPONIN I SERPL DL<=0.01 NG/ML-MCNC: 0.14 NG/ML (ref 0–0.03)
URN SPEC COLLECT METH UR: ABNORMAL
UROBILINOGEN UR STRIP-ACNC: NEGATIVE EU/DL
WBC # BLD AUTO: 9.8 K/UL (ref 3.9–12.7)
WBC #/AREA URNS HPF: 2 /HPF (ref 0–5)

## 2019-04-11 PROCEDURE — 63600175 PHARM REV CODE 636 W HCPCS: Performed by: HOSPITALIST

## 2019-04-11 PROCEDURE — 97116 GAIT TRAINING THERAPY: CPT

## 2019-04-11 PROCEDURE — 25000003 PHARM REV CODE 250: Performed by: HOSPITALIST

## 2019-04-11 PROCEDURE — 99231 SBSQ HOSP IP/OBS SF/LOW 25: CPT | Mod: ,,, | Performed by: INTERNAL MEDICINE

## 2019-04-11 PROCEDURE — 36415 COLL VENOUS BLD VENIPUNCTURE: CPT

## 2019-04-11 PROCEDURE — 84484 ASSAY OF TROPONIN QUANT: CPT

## 2019-04-11 PROCEDURE — 27000221 HC OXYGEN, UP TO 24 HOURS

## 2019-04-11 PROCEDURE — 85025 COMPLETE CBC W/AUTO DIFF WBC: CPT

## 2019-04-11 PROCEDURE — 25000242 PHARM REV CODE 250 ALT 637 W/ HCPCS: Performed by: HOSPITALIST

## 2019-04-11 PROCEDURE — 25000003 PHARM REV CODE 250: Performed by: INTERNAL MEDICINE

## 2019-04-11 PROCEDURE — 63600175 PHARM REV CODE 636 W HCPCS: Performed by: NURSE PRACTITIONER

## 2019-04-11 PROCEDURE — 94640 AIRWAY INHALATION TREATMENT: CPT

## 2019-04-11 PROCEDURE — 25000242 PHARM REV CODE 250 ALT 637 W/ HCPCS: Performed by: INTERNAL MEDICINE

## 2019-04-11 PROCEDURE — 81000 URINALYSIS NONAUTO W/SCOPE: CPT

## 2019-04-11 PROCEDURE — 84100 ASSAY OF PHOSPHORUS: CPT

## 2019-04-11 PROCEDURE — 25000003 PHARM REV CODE 250: Performed by: NURSE PRACTITIONER

## 2019-04-11 PROCEDURE — 84484 ASSAY OF TROPONIN QUANT: CPT | Mod: 91

## 2019-04-11 PROCEDURE — 11000001 HC ACUTE MED/SURG PRIVATE ROOM

## 2019-04-11 PROCEDURE — 94761 N-INVAS EAR/PLS OXIMETRY MLT: CPT

## 2019-04-11 PROCEDURE — 63600175 PHARM REV CODE 636 W HCPCS: Performed by: INTERNAL MEDICINE

## 2019-04-11 PROCEDURE — 80048 BASIC METABOLIC PNL TOTAL CA: CPT

## 2019-04-11 PROCEDURE — 99231 PR SUBSEQUENT HOSPITAL CARE,LEVL I: ICD-10-PCS | Mod: ,,, | Performed by: INTERNAL MEDICINE

## 2019-04-11 RX ORDER — DOXYCYCLINE HYCLATE 100 MG
100 TABLET ORAL EVERY 12 HOURS
Status: DISCONTINUED | OUTPATIENT
Start: 2019-04-11 | End: 2019-04-12 | Stop reason: HOSPADM

## 2019-04-11 RX ADMIN — ATORVASTATIN CALCIUM 20 MG: 20 TABLET, FILM COATED ORAL at 10:04

## 2019-04-11 RX ADMIN — ENOXAPARIN SODIUM 30 MG: 100 INJECTION SUBCUTANEOUS at 04:04

## 2019-04-11 RX ADMIN — IPRATROPIUM BROMIDE 0.5 MG: 0.5 SOLUTION RESPIRATORY (INHALATION) at 01:04

## 2019-04-11 RX ADMIN — LEVALBUTEROL 1.25 MG: 1.25 SOLUTION, CONCENTRATE RESPIRATORY (INHALATION) at 08:04

## 2019-04-11 RX ADMIN — IPRATROPIUM BROMIDE 0.5 MG: 0.5 SOLUTION RESPIRATORY (INHALATION) at 08:04

## 2019-04-11 RX ADMIN — HYDRALAZINE HYDROCHLORIDE 100 MG: 25 TABLET, FILM COATED ORAL at 08:04

## 2019-04-11 RX ADMIN — DOXYCYCLINE HYCLATE 100 MG: 100 TABLET, COATED ORAL at 08:04

## 2019-04-11 RX ADMIN — FUROSEMIDE 40 MG: 40 TABLET ORAL at 10:04

## 2019-04-11 RX ADMIN — NIFEDIPINE 30 MG: 30 TABLET, FILM COATED, EXTENDED RELEASE ORAL at 10:04

## 2019-04-11 RX ADMIN — FAMOTIDINE 20 MG: 20 TABLET, FILM COATED ORAL at 09:04

## 2019-04-11 RX ADMIN — ONDANSETRON 4 MG: 2 INJECTION INTRAMUSCULAR; INTRAVENOUS at 11:04

## 2019-04-11 RX ADMIN — POTASSIUM CHLORIDE 40 MEQ: 750 TABLET, EXTENDED RELEASE ORAL at 08:04

## 2019-04-11 RX ADMIN — POTASSIUM CHLORIDE 40 MEQ: 750 TABLET, EXTENDED RELEASE ORAL at 10:04

## 2019-04-11 RX ADMIN — ACETAMINOPHEN 650 MG: 325 TABLET ORAL at 08:04

## 2019-04-11 RX ADMIN — CLOPIDOGREL BISULFATE 75 MG: 75 TABLET, FILM COATED ORAL at 10:04

## 2019-04-11 RX ADMIN — OXYBUTYNIN CHLORIDE 5 MG: 5 TABLET, EXTENDED RELEASE ORAL at 10:04

## 2019-04-11 RX ADMIN — CARVEDILOL 6.25 MG: 6.25 TABLET, FILM COATED ORAL at 08:04

## 2019-04-11 RX ADMIN — CEFTRIAXONE 2 G: 2 INJECTION, SOLUTION INTRAVENOUS at 03:04

## 2019-04-11 RX ADMIN — VITAMIN D, TAB 1000IU (100/BT) 2000 UNITS: 25 TAB at 10:04

## 2019-04-11 RX ADMIN — ONDANSETRON 4 MG: 2 INJECTION INTRAMUSCULAR; INTRAVENOUS at 09:04

## 2019-04-11 RX ADMIN — DOXYCYCLINE 100 MG: 100 INJECTION, POWDER, LYOPHILIZED, FOR SOLUTION INTRAVENOUS at 03:04

## 2019-04-11 RX ADMIN — DOXYCYCLINE 100 MG: 100 INJECTION, POWDER, LYOPHILIZED, FOR SOLUTION INTRAVENOUS at 04:04

## 2019-04-11 RX ADMIN — PANTOPRAZOLE SODIUM 40 MG: 40 TABLET, DELAYED RELEASE ORAL at 10:04

## 2019-04-11 RX ADMIN — BUDESONIDE 0.5 MG: 0.5 SUSPENSION RESPIRATORY (INHALATION) at 08:04

## 2019-04-11 RX ADMIN — ESCITALOPRAM OXALATE 10 MG: 10 TABLET, FILM COATED ORAL at 10:04

## 2019-04-11 RX ADMIN — FERROUS SULFATE TAB EC 325 MG (65 MG FE EQUIVALENT) 325 MG: 325 (65 FE) TABLET DELAYED RESPONSE at 08:04

## 2019-04-11 RX ADMIN — TRAZODONE HYDROCHLORIDE 50 MG: 50 TABLET ORAL at 08:04

## 2019-04-11 RX ADMIN — FERROUS SULFATE TAB EC 325 MG (65 MG FE EQUIVALENT) 325 MG: 325 (65 FE) TABLET DELAYED RESPONSE at 04:04

## 2019-04-11 RX ADMIN — FOLIC ACID 1 MG: 1 TABLET ORAL at 10:04

## 2019-04-11 RX ADMIN — HYDRALAZINE HYDROCHLORIDE 100 MG: 25 TABLET, FILM COATED ORAL at 01:04

## 2019-04-11 RX ADMIN — FERROUS SULFATE TAB EC 325 MG (65 MG FE EQUIVALENT) 325 MG: 325 (65 FE) TABLET DELAYED RESPONSE at 10:04

## 2019-04-11 RX ADMIN — CARVEDILOL 6.25 MG: 6.25 TABLET, FILM COATED ORAL at 10:04

## 2019-04-11 NOTE — PT/OT/SLP PROGRESS
Physical Therapy Treatment    Patient Name:  Duc Spears   MRN:  [id number]    Recommendations:     Discharge Recommendations:  home health PT   Discharge Equipment Recommendations: walker, rolling   Barriers to discharge: None    Assessment:     Duc Spears is a 74 y.o. male admitted with a medical diagnosis of Acute on chronic respiratory failure with hypoxia.  He presents with the following impairments/functional limitations:  weakness, impaired endurance, impaired functional mobilty, impaired cardiopulmonary response to activity, impaired self care skills, gait instability .    Rehab Prognosis: Fair; patient would benefit from acute skilled PT services to address these deficits and reach maximum level of function.    Recent Surgery: Procedure(s) (LRB):  Bronchoscopy - need to cleanout, in icu best.  marginal pt (N/A)      Plan:     During this hospitalization, patient to be seen 6 x/week to address the identified rehab impairments via gait training, therapeutic activities, therapeutic exercises and progress toward the following goals:    · Plan of Care Expires:  04/26/19    Subjective     Chief Complaint: none stated  Patient/Family Comments/goals: to return home  Pain/Comfort:  · Pain Rating 1: 0/10      Objective:     Communicated with nurse Granados prior to session.  Patient found supine with telemetry, bed alarm, oxygen, castaneda catheter, peripheral IV upon PT entry to room.     General Precautions: Standard, fall   Orthopedic Precautions:N/A   Braces: N/A     Functional Mobility:  · Bed Mobility:     · Rolling Right: contact guard assistance  · Supine to Sit: contact guard assistance  · Transfers:     · Sit to Stand:  contact guard assistance with rolling walker  · Gait: 120' with rw and CGA with O2 attached.      AM-PAC 6 CLICK MOBILITY          Therapeutic Activities and Exercises:   Ambulated with rw and CGA .    Returned to room to sit up in chair.    Patient left up in chair with all  lines intact, call button in reach, chair alarm on and nurse Roberta notified..    GOALS:   Multidisciplinary Problems     Physical Therapy Goals        Problem: Physical Therapy Goal    Goal Priority Disciplines Outcome Goal Variances Interventions   Physical Therapy Goal     PT, PT/OT Ongoing (interventions implemented as appropriate)     Description:  Goals to be met by: 2019     Patient will increase functional independence with mobility by performin. Supine to sit with Stand-by Assistance  2. Sit to stand transfer with Stand-by Assistance  3. Bed to chair transfer with Stand-by Assistance   4. Gait  x 250 feet with Contact Guard Assistance using Rolling Walker.   5. Lower extremity exercise program x20 reps                     Time Tracking:     PT Received On: 19  PT Start Time: 1032     PT Stop Time: 1042  PT Total Time (min): 10 min     Billable Minutes: Gait Training 10min    Treatment Type: Treatment  PT/PTA: PTA     PTA Visit Number: 3     Zee Retana PTA  2019

## 2019-04-11 NOTE — PROGRESS NOTES
Results for SHANE NUNO (MRN [id number]) as of 4/11/2019 13:15   Ref. Range 4/10/2019 17:48 4/11/2019 00:22 4/11/2019 05:44 4/11/2019 11:30   Troponin I Latest Ref Range: 0.000 - 0.026 ng/mL 0.062 (H) 0.086 (H) 0.136 (H) 0.119 (H)     Spoke with MD [facility] in reference to pt troponin. Dr. King elevation is due to COSTA and we can continue to monitor.

## 2019-04-11 NOTE — PLAN OF CARE
Pt is sating 92-93% on 3L NC. Pt receives Q6 Atrovent, Q12 Xopenex 1.25 and Pulmicort Q12. Pt has BIPAP ordered QHS, pt states he does not wear it and does not want it.      04/10/19 2049   Patient Assessment/Suction   Level of Consciousness (AVPU) alert   Respiratory Effort Normal;Unlabored   Expansion/Accessory Muscles/Retractions no use of accessory muscles   All Lung Fields Breath Sounds Anterior:;diminished   PRE-TX-O2   O2 Device (Oxygen Therapy) nasal cannula   $ Is the patient on Low Flow Oxygen? Yes   Flow (L/min) 3   Oxygen Concentration (%) 32   SpO2 (!) 92 %   Pulse Oximetry Type Intermittent   $ Pulse Oximetry - Multiple Charge Pulse Oximetry - Multiple   Pulse 103   Resp 17   Aerosol Therapy   $ Aerosol Therapy Charges Aerosol Treatment   Respiratory Treatment Status (SVN) given   Treatment Route (SVN) mask   Patient Position (SVN) HOB elevated   Post Treatment Assessment (SVN) breath sounds unchanged   Signs of Intolerance (SVN) none   Breath Sounds Post-Respiratory Treatment   Throughout All Fields Post-Treatment All Fields   Throughout All Fields Post-Treatment no change   Post-treatment Heart Rate (beats/min) 105   Post-treatment Resp Rate (breaths/min) 17   Ready to Wean/Extubation Screen   FIO2<=50 (chart decimal) 0.32

## 2019-04-11 NOTE — PLAN OF CARE
Problem: Adult Inpatient Plan of Care  Goal: Plan of Care Review  Outcome: Ongoing (interventions implemented as appropriate)  Pt resting in bed.   Has castaneda cath.  Ambulates to bedside commode.   Thickened liquids, tolerates well.  Bed low and locked. Upper side rails up x2. Call light in reach.

## 2019-04-11 NOTE — PROGRESS NOTES
Spoke with Tommy Nephrology MD with recommendation for pt to follow up outpatient for renal biopsy.  Spoke to MD Cordell about standpoints from nephrology and cardiology.  is preparing necessary for pt discharge, possibly planned for tomorrow.

## 2019-04-11 NOTE — PROGRESS NOTES
Progress Note  PULMONARY    Admit Date: 4/3/2019   04/11/2019      SUBJECTIVE:     April 4th, alert, on niv and tolerating well.    April 5, choked on breakfast, less sob, off niv now.  April 8, now on floor, progressing - better- no c/o  April 9th, no complaints  April 10th, stable, breathing fairly well.  Hoping outpt am.  April11, no c/o, dc am?    PFSH and Allergies reviewed.    OBJECTIVE:     Vitals (Most recent):  Vitals:    04/11/19 1609   BP: (!) 159/72   Pulse: 88   Resp: 20   Temp: 98.4 °F (36.9 °C)       Vitals (24 hour range):  Temp:  [97.3 °F (36.3 °C)-98.8 °F (37.1 °C)]   Pulse:  []   Resp:  [16-20]   BP: (121-174)/(58-78)   SpO2:  [90 %-95 %]       Intake/Output Summary (Last 24 hours) at 4/11/2019 1623  Last data filed at 4/11/2019 1100  Gross per 24 hour   Intake 910 ml   Output 1250 ml   Net -340 ml          Physical Exam:  The patient's neuro status (alertness,orientation,cognitive function,motor skills,), pharyngeal exam (oral lesions, hygiene, abn dentition,), Neck (jvd,mass,thyroid,nodes in neck and above/below clavicle),RESPIRATORY(symmetry,effort,fremitus,percussion,auscultation),  Cor(rhythm,heart tones including gallops,perfusion,edema)ABD(distention,hepatic&splenomegaly,tenderness,masses), Skin(rash,cyanosis),Psyc(affect,judgement,).  Exam negative except for these pertinent findings:    Alert,nc, no distress, diffuse rales, no sign edema, symmetric, nl fremitus and percussion, distent cor    Radiographs reviewed: view by direct vision  4/5/19 cw pulm edeam and copd- not much changes- 4/6 stil dense lower lungs.      No results found for this or any previous visit.]    Labs     Recent Labs   Lab 04/11/19  0544   WBC 9.80   HGB 8.2*   HCT 26.1*        Recent Labs   Lab 04/11/19  0544 04/11/19  1130     --    K 3.6  --    CL 96  --    CO2 34*  --    BUN 59*  --    CREATININE 2.5*  --      --    CALCIUM 9.3  --    PHOS 4.1  --    TROPONINI 0.136* 0.119*   No results  for input(s): PH, PCO2, PO2, HCO3 in the last 24 hours.  Microbiology Results (last 7 days)     Procedure Component Value Units Date/Time    Blood culture [[id number]] Collected:  04/03/19 0343    Order Status:  Completed Specimen:  Blood Updated:  04/08/19 1212     Blood Culture, Routine No growth after 5 days.    Culture, Respiratory with Gram Stain [[id number]] Collected:  04/04/19 1333    Order Status:  Completed Specimen:  Respiratory from Sputum, Expectorated Updated:  04/08/19 1035     Respiratory Culture Normal respiratory lester     Respiratory Culture No S aureus or Pseudomonas isolated.     Gram Stain (Respiratory) <10 epithelial cells per low power field.     Gram Stain (Respiratory) Rare WBC's     Gram Stain (Respiratory) Moderate Gram positive cocci     Gram Stain (Respiratory) Few yeast          Impression:  Active Hospital Problems    Diagnosis  POA    *Acute on chronic respiratory failure with hypoxia [J96.21]  Yes    Aspiration into airway [T17.908A]  No    CAD (coronary artery disease) [I25.10]  Yes    Elevated d-dimer [R79.89]  Yes    COPD exacerbation [J44.1]  Yes    Calcification of aorta [I70.0]  Yes    Acute pulmonary edema [J81.0]  Yes    Acute diastolic congestive heart failure [I50.31]  Yes    Essential hypertension [I10]  Yes    COSTA (acute kidney injury) [N17.9]  Yes      Resolved Hospital Problems   No resolved problems to display.               Plan:   April 4 - only 800cc negative on lasix 80q8- still in pulm edema, echo diastolic chf.  Renal would seem biggest issue.  Lasix drip or dialysis.  resp compensated but marginal..    Will order lasix drip.  Likely needs dialysis acutely.  Renal is seeing.    April 5 , I/o -2200 on lasix drip 10/hr, aspirated some breakfast but breathing better.  Creat better on lasix drip- continue drip.  Speech eval  gpc in sputum, improved but slow.  Question if aspiration significant?  procal was 0.12 on 3rd.    April 8, lytes good but cxr 4/6  still bilat lower lung symmetric dz.     F/u cxr and consider ct if not clearing?    Need to get off lasix drip- will stop am if cxr cleared..    April 9, cxr slowly better, needs  effective diuretic regimen.  He still has edema in the lung and also pleural effusions bilaterally on x-ray.    Would be reasonable to consider outpatient management and office follow-up in a week.  Monitoring weights would be good.  Home health would be needed.  Patient is expected to be marginal given his edema problems/fluid retention    April 10th, patient has aspiration tendency and is being managed by speech with techniques.  Patient is off the Lasix drip.  Fluid balance was negative yesterday.  Respiratory status seems to be improving.  Outpatient therapy and follow-up would be considered soon    April 11, will go to po doxycycline day 8 hosp stay from iv doxy and rocephin.  outpt ok resp- off abx ok.              .

## 2019-04-11 NOTE — PLAN OF CARE
Problem: Physical Therapy Goal  Goal: Physical Therapy Goal  Goals to be met by: 2019     Patient will increase functional independence with mobility by performin. Supine to sit with Stand-by Assistance  2. Sit to stand transfer with Stand-by Assistance  3. Bed to chair transfer with Stand-by Assistance   4. Gait  x 250 feet with Contact Guard Assistance using Rolling Walker.   5. Lower extremity exercise program x20 reps    Outcome: Ongoing (interventions implemented as appropriate)  Ambulated with rw and A for safety with O2 attached.

## 2019-04-11 NOTE — PROGRESS NOTES
Progress Note  Hospital Medicine  Patient Name:Duc Spears  MRN:  [id number]  Patient Class: IP- Inpatient  Admit Date: 4/3/2019  Length of Stay: 8 days  Expected Discharge Date:   Attending Physician: Mimi Landa MD  Primary Care Provider:  Primary Doctor No    SUBJECTIVE:     Principal Problem: Acute on chronic respiratory failure with hypoxia  Initial history of present illness: Mr. Spears is a 73yo M with a PMH of HTN, Chronic Respiratory failure O2 Dependent, and CAD. He presented to [place] with c/o Dyspnea. It started 2 days ago and got progressively worst. On arrival to [place], he was in respiratory distress and hypoxic. He was found to be in acute CHF and given nebulizers, high flow 02, and a total of 80mg IV Lasix. He was also found to have COSTA. He was transferred to [facility] for Nephrology services. On arrival to ICU, he was in respiratory distress and hypoxic. A 1L bag of IV NS was in progress on his arrival, and about 500cc was left in the bag. The infusion was stopped. Bipap therapy was initiated and he was given another 40mg IV lasix for pulmonary congestion. eICU physician ordered nebulizers. A repeat CXR was done and was essentially unchanged from the one earlier done at [place]. After initiateion of Bipap and a nebulizer treatment, he reports that he is starting to breath better. On Bipap, his SPO2 improved to 90%, up from 70's.     PMH/PSH/SH/FH/Meds: reviewed.    Symptoms/Review of Systems:  Patient seen and examined.  Patient experienced substernal chest pain this morning. Feeling better, sitting in chair.  On 4 liters/minute oxygen via nasal cannula.  Diet:  Soft mechanical nectar thick  Activity level:  Up with assist   Pain:  Patient reports no pain.       OBJECTIVE:   Vital Signs (Most Recent):      Temp: 97.3 °F (36.3 °C) (04/11/19 0801)  Pulse: 92 (04/11/19 0847)  Resp: 18 (04/11/19 0847)  BP: (!) 155/78 (04/11/19 0801)  SpO2: (!) 93 % (04/11/19 0847)       Vital Signs Range (Last  24H):  Temp:  [97.2 °F (36.2 °C)-98.8 °F (37.1 °C)]   Pulse:  []   Resp:  [16-19]   BP: (121-189)/(58-84)   SpO2:  [90 %-95 %]     Weight: 81.6 kg (179 lb 14.4 oz)  Body mass index is 27.35 kg/m².    Intake/Output Summary (Last 24 hours) at 4/11/2019 0913  Last data filed at 4/11/2019 0500  Gross per 24 hour   Intake 1390 ml   Output 500 ml   Net 890 ml     Physical Examination:  Constitutional: He is oriented to person, place, and time.   HENT:   Head: Normocephalic.   Eyes: Pupils are equal, round, and reactive to light.   Neck: Normal range of motion. Neck supple. No JVD present. No tracheal deviation present.   Cardiovascular: Normal rate, regular rhythm, normal heart sounds and intact distal pulses.   Pulmonary/Chest:Diffuse rhonchi bilaterally   Abdominal: Soft. Bowel sounds are normal. He exhibits no distension. There is no tenderness.   Musculoskeletal: Normal range of motion. He exhibits no edema.   Neurological: He is alert and oriented to person, place, and time. No cranial nerve deficit.   Skin: Skin is warm, dry and intact. Capillary refill takes less than 2 seconds.   CRANIAL NERVES   CN III, IV, VI   Pupils are equal, round, and reactive to light.    CBC:  Recent Labs   Lab 04/09/19  0408 04/10/19  0427 04/11/19  0544   WBC 9.50 11.00 9.80   RBC 3.62* 3.64* 3.17*   HGB 9.2* 9.6* 8.2*   HCT 29.5* 29.6* 26.1*   * 149* 150   MCV 82 81* 82   MCH 25.5* 26.3* 25.9*   MCHC 31.2* 32.4 31.5*   BMP  Recent Labs   Lab 04/07/19  0337  04/09/19  0408 04/10/19  0427 04/11/19  0544   *   < > 111* 111* 109   *   < > 141 139 140   K 3.2*   < > 3.4* 3.2* 3.6      < > 98 94* 96   CO2 31*   < > 33* 33* 34*   BUN 65*   < > 56* 55* 59*   CREATININE 2.4*   < > 2.5* 2.5* 2.5*   CALCIUM 9.3   < > 9.3 9.7 9.3   MG 1.8  --   --   --   --     < > = values in this interval not displayed.      Diagnostic Results:  Microbiology Results (last 7 days)     Procedure Component Value Units Date/Time     Blood culture [[id number]] Collected:  04/03/19 0343    Order Status:  Completed Specimen:  Blood Updated:  04/08/19 1212     Blood Culture, Routine No growth after 5 days.    Culture, Respiratory with Gram Stain [[id number]] Collected:  04/04/19 1333    Order Status:  Completed Specimen:  Respiratory from Sputum, Expectorated Updated:  04/08/19 1035     Respiratory Culture Normal respiratory lester     Respiratory Culture No S aureus or Pseudomonas isolated.     Gram Stain (Respiratory) <10 epithelial cells per low power field.     Gram Stain (Respiratory) Rare WBC's     Gram Stain (Respiratory) Moderate Gram positive cocci     Gram Stain (Respiratory) Few yeast         Renal US:  Features of medical renal disease.  No hydronephrosis. Cholelithiasis.    CXR:  1. Bilateral pleural effusions between small to moderate in size.  2. Bilateral airspace disease which could reflect atelectasis, pneumonia, pulmonary edema, acute lung injury/ARDS.  3. Cardiomegaly.    ECHO:  · Concentric left ventricular hypertrophy.  · Normal left ventricular systolic function. The estimated ejection fraction is 58%  · Grade III (severe) left ventricular diastolic dysfunction consistent with restrictive physiology.  · Elevated left atrial pressure.  · Low normal right ventricular systolic function.  · Moderate left atrial enlargement.  · Mild right atrial enlargement.  · Mild mitral regurgitation.  · Mild tricuspid regurgitation.  · Elevated central venous pressure (15 mm Hg).  · The estimated PA systolic pressure is 43 mm Hg  · Pulmonary hypertension present.  · There is a moderate left pleural effusion.  · Pericardial effusion.    CXR: Bilateral airspace disease and pleural effusions, similar in extent.       MBSS: Moderate dysphagia.        CXR; Continued bilateral perihilar and lower lobe infiltrates.  Borderline heart size.  Atherosclerosis.       Bilateral leg venous doppler scan: No evidence of deep venous thrombosis in either lower  extremity.       CXR: Persistent features of congestive failure accompanied by layering bilateral pleural effusions.  Mild improvement in aeration of the lung bases noted.    CXR: Mild cardiomegaly.  Atherosclerosis.  Bilateral lower lobe infiltrates and probable trace bilateral pleural effusions.    Assessment/Plan:   * Acute on chronic respiratory failure with hypoxia  Continue supplemental oxygen to maintain pulse ox above 92%.  Use BiPAP noninvasive ventilatory therapy as needed.  On IV Rocephin and doxycycline.  Follow pulmonary recommendations.  Continue beta 2 agonist bronchodilator nebulization treatments.     Acute on chronic diastolic congestive heart failure exacerbation  Follow Cardiology recommendations.  Echo results reviewed.  On PO Lasix 40 mg q.day.   Hold chronic ARB due to COSTA.  Continue Coreg - titrate as needed  Monitor on telemetry.    Chest pain - atypical  Supplemental O2 via nasal canula; titrate O2 saturation to >92%.  Tele-monitoring.   Na restriction <2g/d.     COSTA (acute kidney injury) - stabilizing  Hold ARB, and Sulindac for now.  Follow Nephrology recommendation.  Renal ultrasound results reviewed.  Strict I&O  Avoid NSAIDs/Nephrotoxins  Renal dose medications    CAD (coronary artery disease)  Continue statin and plavix  Not on chronic ASA     Essential hypertension  Chronic/Controlled. Continue chronic medications, adjusting as needed.  Started on nifedipine XL 30 mg daily for pulmonary hypertension    Nonsustained ventricular tachycardia, asymptomatic  Telemonitoring.  Continue serial cardiac enzymes.  Follow Cardiology recommendation    Physical deconditioning  PT/OT.    Anemia of chronic disease  Iron deficiency anemia  Follow CBC. On Ferrous sulphate supplementation.    Mixed connective tissue disorder  Patient counseled and advised to follow up with rheumatologist upon discharge.      CAROLINE positive while anti DNA negative. + anti SM/RNP.  Follow hepatitis panel, HIV,      cryoglobulin, ANCA and anti GBM.  Nephrology team considering renal biopsy    Patient declines skilled nursing facility placement.  Patient would like to go home with home health and home physical therapy. Likely DC tomorrow after discussion with Dr. Sanders.        VTE Risk Mitigation (From admission, onward)        Ordered     enoxaparin injection 30 mg  Daily      04/08/19 1259     IP VTE HIGH RISK PATIENT  Once      04/03/19 0252     Place TIANA hose  Until discontinued      04/03/19 0252        Mimi Landa MD  Department of Hospital Medicine   [facility]

## 2019-04-11 NOTE — PLAN OF CARE
Problem: Adult Inpatient Plan of Care  Goal: Plan of Care Review  Outcome: Ongoing (interventions implemented as appropriate)  Patient receiving aerosol tx via 1.25mg xopenex now and q12hr, 0.5mg atrovent now and q6hr and 0.5mg pulmicort now and q12hr.  Hr 106 and 02 saturation 93% on nc at 3lpm.

## 2019-04-11 NOTE — PROGRESS NOTES
INPATIENT NEPHROLOGY PROGRESS NOTE  [facility] NEPHROLOGY    Patient Name: Duc Spears  Date: 04/11/2019    Reason for consultation: COSTA    Chief Complaint: Dyspnea    History of Present Illness:  75M with COPD on 2L home oxygen, CAD (MI 1988), CHF, HTN, smoker for many years (quit 2 weeks) presented with SOB, wheezing for 2 weeks, undergoing treatment for COPD and CHF, consulted for COSTA.    · Interval History/Subjective:    - BP is better today, on 3L NC  - UOP 500cc  - no cp, dyspnea, abd pain, edema    · Review of Systems: All 14 systems reviewed and negative, except as noted in HPI    Plan of Care:    Assessment:  COSTA vs CKD  HTN  CHF exacerbation (baseline COPD with pulm HTN/restrictive physiology with DD)  Hypokalemia/Alkalosis  SHPT (vitamin D deficiency + CKD related)  Anemia     Plan:    - Renal function is stable. He is nonoliguric. Prior UA infected- awaiting repeat UA to evaluate for hematuria. He has 5g proteinuria. 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 Complements are normal. Ordered hepatitis, HIV, cryoglobulin, ANCA and antiGBM since he reports blood tinged sputum- all pending. He will need f/u with rheumatology. He will need cardiac clearance to be off blood thinners for a renal biopsy (Dr. Sarmiento is following)- can plan as outpatient. If repeat UA shows hematuria, will start steroids- we can also start this as outpatient (since renal function is not steadily declining, suspicion for RPGN requiring pulse steroids is low). No NSAIDs or IV contrast.   - BP is better. Continue nifedipine XL 30mg daily. VS are improved. Continue oral lasix. Keep ARB on hold.   - K is better. Continue standing oral KCl 40mEq BID. Alkalosis is stable.   - Continue cholecalciferol 2000 units daily.   - Hb is dropping. He is iron deficient. He is on iron tablets TID. Ordered occult stool.      Will discuss discharge with hospitalist- from a renal standpoint, we can continue above plan of care as outpatient. However, worsening anemia is concerning so will defer that to hospitalist.     Thank you for allowing us to participate in this patient's care. We will continue to follow.    Medications:  No current facility-administered medications on file prior to encounter.      Current Outpatient Medications on File Prior to Encounter   Medication Sig Dispense Refill    albuterol (VENTOLIN HFA) 90 mcg/actuation inhaler Ventolin HFA 90 mcg/actuation aerosol inhaler      atorvastatin (LIPITOR) 20 MG tablet atorvastatin 20 mg tablet      clopidogrel (PLAVIX) 75 mg tablet clopidogrel 75 mg tablet      cyanocobalamin (VITAMIN B-12) 1000 MCG tablet Vitamin B-12 1,000 mcg tablet   TK 1 T PO QD      escitalopram oxalate (LEXAPRO) 10 MG tablet escitalopram 10 mg tablet      ferrous sulfate (FEOSOL) 325 mg (65 mg iron) Tab tablet ferrous sulfate 325 mg (65 mg iron) tablet   TK 1 T PO TID      folic acid (FOLVITE) 1 MG tablet folic acid 1 mg tablet   TK 1 T PO D      hydroCHLOROthiazide (HYDRODIURIL) 50 MG tablet hydrochlorothiazide 50 mg tablet      hydrOXYzine pamoate (VISTARIL) 25 MG Cap hydroxyzine pamoate 25 mg capsule      losartan-hydrochlorothiazide 100-25 mg (HYZAAR) 100-25 mg per tablet losartan 100 mg-hydrochlorothiazide 25 mg tablet      omeprazole (PRILOSEC) 20 MG capsule omeprazole 20 mg capsule,delayed release      ondansetron (ZOFRAN) 4 MG tablet ondansetron HCl 4 mg tablet      oxybutynin (DITROPAN-XL) 5 MG TR24 oxybutynin chloride ER 5 mg tablet,extended release 24 hr   TK 1 T PO D      pantoprazole (PROTONIX) 40 MG tablet pantoprazole 40 mg tablet,delayed release      polyethylene glycol (GAVILYTE-G) 236-22.74-6.74 -5.86 gram suspension GaviLyte-G 236 gram-22.74 gram-6.74 gram-5.86 gram oral solution      potassium chloride SA (K-DUR,KLOR-CON) 20 MEQ tablet potassium chloride ER 20 mEq  tablet,extended release(part/cryst)      pregabalin (LYRICA) 150 MG capsule Lyrica 150 mg capsule      sucralfate (CARAFATE) 1 gram tablet sucralfate 1 gram tablet      sulindac (CLINORIL) 200 MG Tab sulindac 200 mg tablet      HYDROcodone-acetaminophen (NORCO) 7.5-325 mg per tablet hydrocodone 7.5 mg-acetaminophen 325 mg tablet      nitroGLYCERIN (NITROSTAT) 0.4 MG SL tablet nitroglycerin 0.4 mg sublingual tablet      traZODone (DESYREL) 50 MG tablet trazodone 50 mg tablet       Scheduled Meds:   atorvastatin  20 mg Oral Daily    budesonide  0.5 mg Nebulization Q12H    carvedilol  6.25 mg Oral BID    cefTRIAXone (ROCEPHIN) IVPB  2 g Intravenous Q24H    clopidogrel  75 mg Oral Daily    doxycycline (VIBRAMYCIN) IVPB  100 mg Intravenous Q12H    enoxaparin  30 mg Subcutaneous Daily    escitalopram oxalate  10 mg Oral Daily    ferrous sulfate  325 mg Oral TID    folic acid  1 mg Oral Daily    furosemide  40 mg Oral Daily    hydrALAZINE  100 mg Oral Q8H    ipratropium  0.5 mg Nebulization Q6H    levalbuterol  1.25 mg Nebulization Q12H    NIFEdipine  30 mg Oral Daily    oxybutynin  5 mg Oral Daily    pantoprazole  40 mg Oral Daily    potassium chloride SA  40 mEq Oral BID    traZODone  50 mg Oral QHS    vitamin D  2,000 Units Oral Daily     Continuous Infusions:    PRN Meds:.acetaminophen, albuterol-ipratropium, famotidine, hydrALAZINE, influenza, ondansetron, pneumoc 13-danie conj-dip cr(PF), ramelteon, senna-docusate 8.6-50 mg, sodium chloride 0.9%    Allergies:  Codeine    Vital Signs:  Temp Readings from Last 3 Encounters:   04/11/19 97.3 °F (36.3 °C) (Oral)   04/03/19 98.1 °F (36.7 °C) (Axillary)       Pulse Readings from Last 3 Encounters:   04/11/19 92   04/03/19 72       BP Readings from Last 3 Encounters:   04/11/19 (!) 155/78   04/03/19 (!) 180/73       Weight:  Wt Readings from Last 3 Encounters:   04/08/19 81.6 kg (179 lb 14.4 oz)   04/02/19 86.2 kg (190 lb)       INS/OUTS:  I/O last 3  completed shifts:  In: 2260 [P.O.:1360; IV Piggyback:900]  Out: 1500 [Urine:1500]  No intake/output data recorded.    Physical Exam:  Constitutional: nad, aao x 3  Heart: rrr, no m/r/g, wwp, no edema  Lungs: ctab, no w/r/c, no lb  Abdomen: s/nt/nd, +BS    Results:  Lab Results   Component Value Date     04/11/2019    K 3.6 04/11/2019    CL 96 04/11/2019    CO2 34 (H) 04/11/2019    BUN 59 (H) 04/11/2019    CREATININE 2.5 (H) 04/11/2019    CALCIUM 9.3 04/11/2019    ANIONGAP 10 04/11/2019    ESTGFRAFRICA 29 (A) 04/11/2019    EGFRNONAA 25 (A) 04/11/2019       Lab Results   Component Value Date    CALCIUM 9.3 04/11/2019    PHOS 4.1 04/11/2019       Recent Labs   Lab 04/11/19  0544   WBC 9.80   RBC 3.17*   HGB 8.2*   HCT 26.1*      MCV 82   MCH 25.9*   MCHC 31.5*       I have personally reviewed pertinent radiological imaging and reports.    Mark Sanders MD MPH  [facility]  [address] [place] [address]  [place] [address]  [phone] (p)  [phone] (f)

## 2019-04-12 VITALS
HEIGHT: 68 IN | HEART RATE: 91 BPM | DIASTOLIC BLOOD PRESSURE: 78 MMHG | WEIGHT: 179.88 LBS | TEMPERATURE: 97 F | RESPIRATION RATE: 18 BRPM | OXYGEN SATURATION: 91 % | SYSTOLIC BLOOD PRESSURE: 180 MMHG | BODY MASS INDEX: 27.26 KG/M2

## 2019-04-12 LAB
ANCA AB TITR SER IF: NORMAL TITER
ANION GAP SERPL CALC-SCNC: 10 MMOL/L (ref 8–16)
BASOPHILS # BLD AUTO: 0 K/UL (ref 0–0.2)
BASOPHILS NFR BLD: 0.2 % (ref 0–1.9)
BM IGG SER-ACNC: <0.2 U
BUN SERPL-MCNC: 56 MG/DL (ref 8–23)
CALCIUM SERPL-MCNC: 9.4 MG/DL (ref 8.7–10.5)
CHLORIDE SERPL-SCNC: 96 MMOL/L (ref 95–110)
CO2 SERPL-SCNC: 31 MMOL/L (ref 23–29)
CREAT SERPL-MCNC: 2.5 MG/DL (ref 0.5–1.4)
DIFFERENTIAL METHOD: ABNORMAL
EOSINOPHIL # BLD AUTO: 0.1 K/UL (ref 0–0.5)
EOSINOPHIL NFR BLD: 0.6 % (ref 0–8)
ERYTHROCYTE [DISTWIDTH] IN BLOOD BY AUTOMATED COUNT: 19.3 % (ref 11.5–14.5)
EST. GFR  (AFRICAN AMERICAN): 29 ML/MIN/1.73 M^2
EST. GFR  (NON AFRICAN AMERICAN): 25 ML/MIN/1.73 M^2
GLUCOSE SERPL-MCNC: 111 MG/DL (ref 70–110)
HCT VFR BLD AUTO: 25.9 % (ref 40–54)
HGB BLD-MCNC: 8.3 G/DL (ref 14–18)
LYMPHOCYTES # BLD AUTO: 1.4 K/UL (ref 1–4.8)
LYMPHOCYTES NFR BLD: 12.5 % (ref 18–48)
MCH RBC QN AUTO: 26.3 PG (ref 27–31)
MCHC RBC AUTO-ENTMCNC: 32 G/DL (ref 32–36)
MCV RBC AUTO: 82 FL (ref 82–98)
MONOCYTES # BLD AUTO: 0.9 K/UL (ref 0.3–1)
MONOCYTES NFR BLD: 8.6 % (ref 4–15)
NEUTROPHILS # BLD AUTO: 8.5 K/UL (ref 1.8–7.7)
NEUTROPHILS NFR BLD: 78.1 % (ref 38–73)
P-ANCA TITR SER IF: NORMAL TITER
PHOSPHATE SERPL-MCNC: 3.6 MG/DL (ref 2.7–4.5)
PLATELET # BLD AUTO: 177 K/UL (ref 150–350)
PMV BLD AUTO: 8.6 FL (ref 9.2–12.9)
POTASSIUM SERPL-SCNC: 3.5 MMOL/L (ref 3.5–5.1)
RBC # BLD AUTO: 3.15 M/UL (ref 4.6–6.2)
SODIUM SERPL-SCNC: 137 MMOL/L (ref 136–145)
TROPONIN I SERPL DL<=0.01 NG/ML-MCNC: 0.12 NG/ML (ref 0–0.03)
TROPONIN I SERPL DL<=0.01 NG/ML-MCNC: 0.12 NG/ML (ref 0–0.03)
TROPONIN I SERPL DL<=0.01 NG/ML-MCNC: 0.14 NG/ML (ref 0–0.03)
WBC # BLD AUTO: 10.8 K/UL (ref 3.9–12.7)

## 2019-04-12 PROCEDURE — 36415 COLL VENOUS BLD VENIPUNCTURE: CPT

## 2019-04-12 PROCEDURE — 99900035 HC TECH TIME PER 15 MIN (STAT)

## 2019-04-12 PROCEDURE — 25000003 PHARM REV CODE 250: Performed by: INTERNAL MEDICINE

## 2019-04-12 PROCEDURE — 85025 COMPLETE CBC W/AUTO DIFF WBC: CPT

## 2019-04-12 PROCEDURE — 84100 ASSAY OF PHOSPHORUS: CPT

## 2019-04-12 PROCEDURE — 25000242 PHARM REV CODE 250 ALT 637 W/ HCPCS: Performed by: INTERNAL MEDICINE

## 2019-04-12 PROCEDURE — 97116 GAIT TRAINING THERAPY: CPT

## 2019-04-12 PROCEDURE — 92526 ORAL FUNCTION THERAPY: CPT

## 2019-04-12 PROCEDURE — 94640 AIRWAY INHALATION TREATMENT: CPT

## 2019-04-12 PROCEDURE — 27000221 HC OXYGEN, UP TO 24 HOURS

## 2019-04-12 PROCEDURE — 84484 ASSAY OF TROPONIN QUANT: CPT | Mod: 91

## 2019-04-12 PROCEDURE — 99231 PR SUBSEQUENT HOSPITAL CARE,LEVL I: ICD-10-PCS | Mod: ,,, | Performed by: INTERNAL MEDICINE

## 2019-04-12 PROCEDURE — 97802 MEDICAL NUTRITION INDIV IN: CPT

## 2019-04-12 PROCEDURE — 92610 EVALUATE SWALLOWING FUNCTION: CPT

## 2019-04-12 PROCEDURE — 94761 N-INVAS EAR/PLS OXIMETRY MLT: CPT

## 2019-04-12 PROCEDURE — 80048 BASIC METABOLIC PNL TOTAL CA: CPT

## 2019-04-12 PROCEDURE — 94799 UNLISTED PULMONARY SVC/PX: CPT

## 2019-04-12 PROCEDURE — 25000242 PHARM REV CODE 250 ALT 637 W/ HCPCS: Performed by: HOSPITALIST

## 2019-04-12 PROCEDURE — 25000003 PHARM REV CODE 250: Performed by: NURSE PRACTITIONER

## 2019-04-12 PROCEDURE — 99231 SBSQ HOSP IP/OBS SF/LOW 25: CPT | Mod: ,,, | Performed by: INTERNAL MEDICINE

## 2019-04-12 RX ORDER — NIFEDIPINE 30 MG/1
30 TABLET, EXTENDED RELEASE ORAL DAILY
Qty: 30 TABLET | Refills: 0 | Status: SHIPPED | OUTPATIENT
Start: 2019-04-13 | End: 2020-04-12

## 2019-04-12 RX ORDER — HYDRALAZINE HYDROCHLORIDE 100 MG/1
100 TABLET, FILM COATED ORAL EVERY 8 HOURS
Qty: 90 TABLET | Refills: 0 | Status: SHIPPED | OUTPATIENT
Start: 2019-04-12 | End: 2020-04-11

## 2019-04-12 RX ORDER — CHOLECALCIFEROL (VITAMIN D3) 50 MCG
2000 TABLET ORAL DAILY
Qty: 30 TABLET | Refills: 0 | Status: SHIPPED | OUTPATIENT
Start: 2019-04-13

## 2019-04-12 RX ORDER — CARVEDILOL 6.25 MG/1
6.25 TABLET ORAL 2 TIMES DAILY
Qty: 60 TABLET | Refills: 0 | Status: SHIPPED | OUTPATIENT
Start: 2019-04-12 | End: 2020-04-11

## 2019-04-12 RX ORDER — BUDESONIDE 0.5 MG/2ML
0.5 INHALANT ORAL EVERY 12 HOURS
Qty: 120 ML | Refills: 0 | Status: SHIPPED | OUTPATIENT
Start: 2019-04-12 | End: 2019-05-12

## 2019-04-12 RX ORDER — IPRATROPIUM BROMIDE AND ALBUTEROL SULFATE 2.5; .5 MG/3ML; MG/3ML
3 SOLUTION RESPIRATORY (INHALATION) EVERY 4 HOURS PRN
Qty: 1 BOX | Refills: 0 | Status: SHIPPED | OUTPATIENT
Start: 2019-04-12 | End: 2020-04-11

## 2019-04-12 RX ORDER — FERROUS SULFATE 325(65) MG
325 TABLET, DELAYED RELEASE (ENTERIC COATED) ORAL 3 TIMES DAILY
Qty: 90 TABLET | Refills: 0 | Status: SHIPPED | OUTPATIENT
Start: 2019-04-12

## 2019-04-12 RX ORDER — AMOXICILLIN 250 MG
1 CAPSULE ORAL 2 TIMES DAILY PRN
COMMUNITY
Start: 2019-04-12

## 2019-04-12 RX ORDER — POTASSIUM CHLORIDE 20 MEQ/1
40 TABLET, EXTENDED RELEASE ORAL 2 TIMES DAILY
Qty: 120 TABLET | Refills: 0 | Status: SHIPPED | OUTPATIENT
Start: 2019-04-12

## 2019-04-12 RX ORDER — FUROSEMIDE 40 MG/1
40 TABLET ORAL DAILY
Qty: 30 TABLET | Refills: 0 | Status: SHIPPED | OUTPATIENT
Start: 2019-04-13 | End: 2020-04-12

## 2019-04-12 RX ADMIN — LEVALBUTEROL 1.25 MG: 1.25 SOLUTION, CONCENTRATE RESPIRATORY (INHALATION) at 07:04

## 2019-04-12 RX ADMIN — NIFEDIPINE 30 MG: 30 TABLET, FILM COATED, EXTENDED RELEASE ORAL at 09:04

## 2019-04-12 RX ADMIN — HYDRALAZINE HYDROCHLORIDE 100 MG: 25 TABLET, FILM COATED ORAL at 03:04

## 2019-04-12 RX ADMIN — PANTOPRAZOLE SODIUM 40 MG: 40 TABLET, DELAYED RELEASE ORAL at 09:04

## 2019-04-12 RX ADMIN — FERROUS SULFATE TAB EC 325 MG (65 MG FE EQUIVALENT) 325 MG: 325 (65 FE) TABLET DELAYED RESPONSE at 03:04

## 2019-04-12 RX ADMIN — CARVEDILOL 6.25 MG: 6.25 TABLET, FILM COATED ORAL at 09:04

## 2019-04-12 RX ADMIN — VITAMIN D, TAB 1000IU (100/BT) 2000 UNITS: 25 TAB at 09:04

## 2019-04-12 RX ADMIN — IPRATROPIUM BROMIDE 0.5 MG: 0.5 SOLUTION RESPIRATORY (INHALATION) at 01:04

## 2019-04-12 RX ADMIN — FOLIC ACID 1 MG: 1 TABLET ORAL at 09:04

## 2019-04-12 RX ADMIN — BUDESONIDE 0.5 MG: 0.5 SUSPENSION RESPIRATORY (INHALATION) at 07:04

## 2019-04-12 RX ADMIN — FERROUS SULFATE TAB EC 325 MG (65 MG FE EQUIVALENT) 325 MG: 325 (65 FE) TABLET DELAYED RESPONSE at 09:04

## 2019-04-12 RX ADMIN — IPRATROPIUM BROMIDE 0.5 MG: 0.5 SOLUTION RESPIRATORY (INHALATION) at 12:04

## 2019-04-12 RX ADMIN — IPRATROPIUM BROMIDE AND ALBUTEROL SULFATE 3 ML: .5; 3 SOLUTION RESPIRATORY (INHALATION) at 04:04

## 2019-04-12 RX ADMIN — POTASSIUM CHLORIDE 40 MEQ: 750 TABLET, EXTENDED RELEASE ORAL at 09:04

## 2019-04-12 RX ADMIN — FUROSEMIDE 40 MG: 40 TABLET ORAL at 09:04

## 2019-04-12 RX ADMIN — ATORVASTATIN CALCIUM 20 MG: 20 TABLET, FILM COATED ORAL at 09:04

## 2019-04-12 RX ADMIN — HYDRALAZINE HYDROCHLORIDE 100 MG: 25 TABLET, FILM COATED ORAL at 05:04

## 2019-04-12 RX ADMIN — DOXYCYCLINE HYCLATE 100 MG: 100 TABLET, COATED ORAL at 09:04

## 2019-04-12 RX ADMIN — IPRATROPIUM BROMIDE 0.5 MG: 0.5 SOLUTION RESPIRATORY (INHALATION) at 07:04

## 2019-04-12 RX ADMIN — OXYBUTYNIN CHLORIDE 5 MG: 5 TABLET, EXTENDED RELEASE ORAL at 09:04

## 2019-04-12 RX ADMIN — ESCITALOPRAM OXALATE 10 MG: 10 TABLET, FILM COATED ORAL at 09:04

## 2019-04-12 RX ADMIN — CLOPIDOGREL BISULFATE 75 MG: 75 TABLET, FILM COATED ORAL at 09:04

## 2019-04-12 NOTE — PLAN OF CARE
I sent the pts facesheet, H&P and nebulizer orders to [facility] for processing. I sent the pts updated clinicals and HH orders to [facility]. I updated the pts face sheet. CM will continue to follow. Bella Mccloud LMSW     04/12/19 1046   Post-Acute Status   Post-Acute Authorization HME;Home Health/Hospice   HME Status Referrals Sent   Home Health/Hospice Status Referrals Sent

## 2019-04-12 NOTE — PROGRESS NOTES
"[facility]  Adult Nutrition  Progress Note    SUMMARY   Intervention: Nutrition education-cardiac, low sodium diet, Fat/sodium/Texture modified diet     Recommendation:   1) Continue diet as ordered   2) Nutrition education and handouts given   3) Weight pt weekly and obtain standing height as able    Goals: PO intakes > 75% of meals(most meals this admission)  Nutrition Goal Status: goal met  Communication of RD Recs: (POC, sticky note)    Reason for Assessment    Reason For Assessment: length of stay  Diagnosis: (acute respiratory failure)  Relevant Medical History: HTN, respiratory failure, sin CA, CAD, CHF  Interdisciplinary Rounds: attended    General Information Comments: 75 y/o male admitted with COSTA, CHF and respiratory failure. COSTA and K improved. Also with iron deficiency anemia. Tolerating diet with good PO intake this admission. Wt loss noted, likely r/t fluid shifts on lasix. NFPE done, no wasting seen. Educated pt on diet for CHF for home, pt agreeable to do whatever it takes to stay out of the hospital Ex: cutting back on fluid intake and cooking with salt substitutes.    Nutrition Discharge Planning: Home with home health- Cardiac, 2 gm Na diet     Nutrition Risk Screen    Nutrition Risk Screen: no indicators present    Nutrition/Diet History    Patient Reported Diet/Restrictions/Preferences: low salt  Typical Food/Fluid Intake: Typically drinks a gallon of water daily he states  Spiritual, Cultural Beliefs, Mormon Practices, Values that Affect Care: no  Food Allergies: NKFA(or intolerances)    Anthropometrics    Temp: 97.3 °F (36.3 °C)  Height Method: Stated  Height: 5' 8"  Height (inches): 68 in  Weight Method: Bed Scale  Weight: 81.6 kg (179 lb 14.3 oz)  Weight (lb): 179.9 lb  Ideal Body Weight (IBW), Male: 154 lb  % Ideal Body Weight, Male (lb): 116.82 lb  BMI (Calculated): 27.4  Usual Body Weight (UBW), k.3 kg  Weight Change Amount: (x 1.5 weeks likely r/t fluid " shifts)  % Usual Body Weight: 92.61  % Weight Change From Usual Weight: -7.59 %       Lab/Procedures/Meds    Pertinent Labs Reviewed: reviewed  BMP  Lab Results   Component Value Date     04/12/2019    K 3.5 04/12/2019    CL 96 04/12/2019    CO2 31 (H) 04/12/2019    BUN 56 (H) 04/12/2019    CREATININE 2.5 (H) 04/12/2019    CALCIUM 9.4 04/12/2019    ANIONGAP 10 04/12/2019    ESTGFRAFRICA 29 (A) 04/12/2019    EGFRNONAA 25 (A) 04/12/2019     Lab Results   Component Value Date    IRON 29 (L) 04/04/2019    TIBC 330 04/04/2019    FERRITIN 62 04/08/2019     No Vitamin D available    Pertinent Medications Reviewed: reviewed  Pertinent Medications Comments: lasix, Vitamin D, iron, statin, folic acid, KCl, zofran, senna    Estimated/Assessed Needs    Weight Used For Calorie Calculations: 81.6 kg (179 lb 14.3 oz)  Energy Calorie Requirements (kcal): [place] St Jeor ( x 1.2) = 1850 kcal  Energy Need Method: [place]-St Jeor  Protein Requirements: 1.1g protein/kg ( age) = 89 g protein  Weight Used For Protein Calculations: 81.6 kg (179 lb 14.3 oz)  Fluid Requirements (mL): 8162-9842  Estimated Fluid Requirement Method: other (see comments)(20-25 ml/kg)  CHO Requirement: N/A      Nutrition Prescription Ordered    Current Diet Order: Cardiac, mechanical soft/nectar thick liquids    Evaluation of Received Nutrient/Fluid Intake    Energy Calories Required: meeting needs  Protein Required: meeting needs  Fluid Required: meeting needs  Tolerance: tolerating  % Intake of Estimated Energy Needs: %  % Meal Intake: Last few days most %    Nutrition Risk    Level of Risk/Frequency of Follow-up: low - moderate 1 x weekly    Assessment and Plan     Nutrition related knowledge deficit  R/t no previous instruction by a dietitian  As evidenced by CHF exacerbation and verbalizing nutrition related questions  Resolved    Monitor and Evaluation    Food and Nutrient Intake: energy intake  Food and Nutrient Adminstration: diet  order  Anthropometric Measurements: weight, height/length  Biochemical Data, Medical Tests and Procedures: electrolyte and renal panel  Nutrition-Focused Physical Findings: overall appearance     Malnutrition Assessment     Skin (Micronutrient): (Jason = 20)  Teeth (Micronutrient): (Missing some)   Micronutrient Evaluation: suspected deficiency  Micronutrient Evaluation Comments: iron, vitamin D   Weight Loss (Malnutrition): (7% x 1.5 weeks likely r/t fluid shifts)           Edema (Fluid Accumulation): 0-->no edema present   Subcutaneous Fat Loss (Final Summary): well nourished  Muscle Loss Evaluation (Final Summary): well nourished         Nutrition Follow-Up    RD Follow-up?: Yes

## 2019-04-12 NOTE — PROGRESS NOTES
04/12/19 1459   Home Oxygen Qualification   Room Air SpO2 At Rest (!) 87 %   Room Air SpO2 on Exertion (!) 87 %   SpO2 During Exertion on O2 92 %   Heart Rate on O2 88 bpm   Exertion O2 LPM 3 LPM   SpO2 on Recovery 95 %   Recovery Heart Rate 84 bpm   Recovery O2 LPM 3 LPM   Home O2 Eval Comments home evaluation completed

## 2019-04-12 NOTE — PT/OT/SLP PROGRESS
"Speech Language Pathology Treatment    Patient Name:  Duc Spears   MRN:  [id number]  Admitting Diagnosis: Acute on chronic respiratory failure with hypoxia    Recommendations:                 General Recommendations:  Dysphagia therapy  Diet recommendations:  Mechanical soft(IDDSI-Minced and Moist), Nectar Thick   Aspiration Precautions: 1 bite/sip at a time, Assistance with thickening liquids, Avoid talking while eating, Double swallow with each bite/sip, Meds crushed in puree, No straws, Small bites/sips and Wear oxygen during intake   General Precautions: Standard, aspiration  Communication strategies:  none    Subjective   "Can I buy it [thickener] at [employer]?"      Objective:   Pt seen for dysphagia therapy. He is up in chair and reports he will be discharging today. He was instructed on importance of compliance of thickener, aspiration precautions, etc. Swallow guidelines hanging HOB were removed and given to pt to take home. He was provided with written instructions re: nectar thick liquids, graduated cup and swallowing guidelines. Performed Shaker to forehead, mendelsohn and SGS with SPV/MIN A.    Has the patient been evaluated by SLP for swallowing?   Yes  Keep patient NPO? No   Current Respiratory Status: nasal cannula      Assessment:     Duc Spears is a 74 y.o. male with an SLP diagnosis of Dysphagia.  He presents with good effort. Continue skilled ST upon discharge to address deficits.     Goals:   Multidisciplinary Problems     SLP Goals        Problem: SLP Goal    Goal Priority Disciplines Outcome   SLP Goal     SLP Ongoing (interventions implemented as appropriate)   Description:    1) MBSS--MET  NEW: Consume recommended diet of mechanical soft (IDDSI-Minced and Moist) textures and nectar thick liquids with no overt s/s penetration/aspiration  2) Demonstrate use of compensatory swallow strategies/aspriation precautions with SPV  3) Perform CTAR, effortful swallow, Shaker and " Mendelsohn with MIN cues                        Plan:     · Patient to be seen:  5 x/week   · Plan of Care expires:  04/27/19  · Plan of Care reviewed with:  patient   · SLP Follow-Up:  Yes       Discharge recommendations:  home health speech therapy   Barriers to Discharge:  None    Time Tracking:     SLP Treatment Date:   04/12/19  Speech Start Time:  1155  Speech Stop Time:  1203     Speech Total Time (min):  8 min    Billable Minutes: Treatment Swallowing Dysfunction 8 and Total Time 8    Roseanna Dixon CCC-SLP  04/12/2019

## 2019-04-12 NOTE — DISCHARGE SUMMARY
Discharge Summary  Hospital Medicine    Admit Date: 4/3/2019    Date and Time: 4/12/20199:55 AM    Discharge Attending Physician: Mimi Landa MD    Primary Care Physician: Primary Doctor No    Diagnoses:  Active Hospital Problems    Diagnosis  POA    *Acute on chronic respiratory failure with hypoxia [J96.21]  Yes    Aspiration into airway [T17.908A]  No    CAD (coronary artery disease) [I25.10]  Yes    Elevated d-dimer [R79.89]  Yes    COPD exacerbation [J44.1]  Yes    Calcification of aorta [I70.0]  Yes    Acute pulmonary edema [J81.0]  Yes    Acute on chronic diastolic congestive heart failure [I50.31]  Yes    Essential hypertension [I10]  Yes    COSTA (acute kidney injury) [N17.9]  Atypical chest pain  Physical deconditioning  Anemia of chronic disease  Iron deficiency anemia  Mixed connective tissue disorder  Yes     Discharged Condition: Good    Hospital Course:   Mr. Spears is a 75yo M with a PMH of HTN, Chronic Respiratory failure O2 Dependent, and CAD. He presented to [place] with c/o Dyspnea. It started 2 days ago and got progressively worse. On arrival to [place], he was in respiratory distress and hypoxic. He was found to be in acute CHF and given nebulizers, high flow 02, and a total of 80mg IV Lasix. He was also found to have COSTA. He was transferred to [facility] for Nephrology services. On arrival to ICU, he was in respiratory distress and hypoxic.  Patient was managed in intensive care unit and required noninvasive BiPAP ventilatory therapy.  Patient was closely followed by Pulmonary Medicine and Cardiology teams.  Patient was treated with intravenous Solu-Medrol and aggressive intravenous diuretic therapy in the form of intravenous Lasix infusion which was supervised by Nephrology team.  Electrolytes were repleted.  Acute diastolic congestive heart failure and COPD exacerbation improved.  Patient received antibiotics for possible aspiration pneumonitis.  During hospital stay patient was treated  for acute kidney injury and rheumatologic workup is suggestive of possibility of mixed connective tissue disorder as evidenced by a positive CAROLINE while anti DNA was negative.  Patient is+ anti SM/RNP.  Further autoimmune workup is pending for hepatitis panel, HIV, cryoglobulin, Anca and anti GBM antibody.  Patient is in need for kidney biopsy which will be arranged by Dr. Sanders in near future.  Patient was instructed he will have to stop taking Plavix 5 days prior to kidney biopsy.  Patient voiced understanding.  Patient to continue home supplemental oxygen as before.  Home health and home physical therapy has been arranged for patient's deconditioning and closer monitoring.  Patient declined skilled nursing facility hospitalization.Patient to monitor daily weight, follow low salt diet and in case if gain more than 3 pounds in 24 hours, please call your doctor for further advice.  Also patient was stressed importance of following up with rheumatologist for further treatment and management regarding mixed connective tissue disorder.  Home health to continue CBC and BMP monitoring.  Patient was instructed to avoid nonsteroidal anti-inflammatory medication use in future. Patient was discharged home in stable condition with following discharge plan of care.     Consults: Dr. Rose, Dr. Charles King, Dr. Sanders    Significant Diagnostic Studies:   Renal US:  Features of medical renal disease.  No hydronephrosis. Cholelithiasis.     CXR:  1. Bilateral pleural effusions between small to moderate in size.  2. Bilateral airspace disease which could reflect atelectasis, pneumonia, pulmonary edema, acute lung injury/ARDS.  3. Cardiomegaly.     ECHO:  · Concentric left ventricular hypertrophy.  · Normal left ventricular systolic function. The estimated ejection fraction is 58%  · Grade III (severe) left ventricular diastolic dysfunction consistent with restrictive physiology.  · Elevated left atrial pressure.  · Low normal right  ventricular systolic function.  · Moderate left atrial enlargement.  · Mild right atrial enlargement.  · Mild mitral regurgitation.  · Mild tricuspid regurgitation.  · Elevated central venous pressure (15 mm Hg).  · The estimated PA systolic pressure is 43 mm Hg  · Pulmonary hypertension present.  · There is a moderate left pleural effusion.  · Pericardial effusion.     CXR: Bilateral airspace disease and pleural effusions, similar in extent.        MBSS: Moderate dysphagia.        CXR; Continued bilateral perihilar and lower lobe infiltrates.  Borderline heart size.  Atherosclerosis.        Bilateral leg venous doppler scan: No evidence of deep venous thrombosis in either lower extremity.       CXR: Persistent features of congestive failure accompanied by layering bilateral pleural effusions.  Mild improvement in aeration of the lung bases noted.     CXR: Mild cardiomegaly.  Atherosclerosis.  Bilateral lower lobe infiltrates and probable trace bilateral pleural effusions.    Microbiology Results (last 7 days)     Procedure Component Value Units Date/Time    Blood culture [[id number]] Collected:  04/03/19 0343    Order Status:  Completed Specimen:  Blood Updated:  04/08/19 1212     Blood Culture, Routine No growth after 5 days.    Culture, Respiratory with Gram Stain [[id number]] Collected:  04/04/19 1333    Order Status:  Completed Specimen:  Respiratory from Sputum, Expectorated Updated:  04/08/19 1035     Respiratory Culture Normal respiratory lester     Respiratory Culture No S aureus or Pseudomonas isolated.     Gram Stain (Respiratory) <10 epithelial cells per low power field.     Gram Stain (Respiratory) Rare WBC's     Gram Stain (Respiratory) Moderate Gram positive cocci     Gram Stain (Respiratory) Few yeast        Special Treatments/Procedures: None  Disposition: Home or Self Care    Medications:  Reconciled Home Medications:   Current Discharge Medication List      START taking these medications    Details    albuterol-ipratropium (DUO-NEB) 2.5 mg-0.5 mg/3 mL nebulizer solution Take 3 mLs by nebulization every 4 (four) hours as needed for Wheezing. Rescue  Qty: 1 Box, Refills: 0      budesonide (PULMICORT) 0.5 mg/2 mL nebulizer solution Take 2 mLs (0.5 mg total) by nebulization every 12 (twelve) hours. Controller  Qty: 120 mL, Refills: 0      carvedilol (COREG) 6.25 MG tablet Take 1 tablet (6.25 mg total) by mouth 2 (two) times daily.  Qty: 60 tablet, Refills: 0      ferrous sulfate 325 (65 FE) MG EC tablet Take 1 tablet (325 mg total) by mouth 3 (three) times daily.  Qty: 90 tablet, Refills: 0      furosemide (LASIX) 40 MG tablet Take 1 tablet (40 mg total) by mouth once daily.  Qty: 30 tablet, Refills: 0      hydrALAZINE (APRESOLINE) 100 MG tablet Take 1 tablet (100 mg total) by mouth every 8 (eight) hours.  Qty: 90 tablet, Refills: 0      NIFEdipine (PROCARDIA-XL) 30 MG (OSM) 24 hr tablet Take 1 tablet (30 mg total) by mouth once daily.  Qty: 30 tablet, Refills: 0      senna-docusate 8.6-50 mg (PERICOLACE) 8.6-50 mg per tablet Take 1 tablet by mouth 2 (two) times daily as needed for Constipation.      vitamin D (VITAMIN D3) 2,000 unit Tab Take 1 tablet (2,000 Units total) by mouth once daily.  Qty: 30 tablet, Refills: 0         CONTINUE these medications which have CHANGED    Details   potassium chloride SA (K-DUR,KLOR-CON) 20 MEQ tablet Take 2 tablets (40 mEq total) by mouth 2 (two) times daily.  Qty: 120 tablet, Refills: 0         CONTINUE these medications which have NOT CHANGED    Details   albuterol (VENTOLIN HFA) 90 mcg/actuation inhaler Ventolin HFA 90 mcg/actuation aerosol inhaler      atorvastatin (LIPITOR) 20 MG tablet atorvastatin 20 mg tablet      clopidogrel (PLAVIX) 75 mg tablet clopidogrel 75 mg tablet      cyanocobalamin (VITAMIN B-12) 1000 MCG tablet Vitamin B-12 1,000 mcg tablet   TK 1 T PO QD      escitalopram oxalate (LEXAPRO) 10 MG tablet escitalopram 10 mg tablet      folic acid (FOLVITE) 1 MG  "tablet folic acid 1 mg tablet   TK 1 T PO D      omeprazole (PRILOSEC) 20 MG capsule omeprazole 20 mg capsule,delayed release      ondansetron (ZOFRAN) 4 MG tablet ondansetron HCl 4 mg tablet      oxybutynin (DITROPAN-XL) 5 MG TR24 oxybutynin chloride ER 5 mg tablet,extended release 24 hr   TK 1 T PO D      pantoprazole (PROTONIX) 40 MG tablet pantoprazole 40 mg tablet,delayed release      polyethylene glycol (GAVILYTE-G) 236-22.74-6.74 -5.86 gram suspension GaviLyte-G 236 gram-22.74 gram-6.74 gram-5.86 gram oral solution      pregabalin (LYRICA) 150 MG capsule Lyrica 150 mg capsule      sucralfate (CARAFATE) 1 gram tablet sucralfate 1 gram tablet      sulindac (CLINORIL) 200 MG Tab sulindac 200 mg tablet      HYDROcodone-acetaminophen (NORCO) 7.5-325 mg per tablet hydrocodone 7.5 mg-acetaminophen 325 mg tablet      nitroGLYCERIN (NITROSTAT) 0.4 MG SL tablet nitroglycerin 0.4 mg sublingual tablet         STOP taking these medications       ferrous sulfate (FEOSOL) 325 mg (65 mg iron) Tab tablet Comments:   Reason for Stopping:         hydroCHLOROthiazide (HYDRODIURIL) 50 MG tablet Comments:   Reason for Stopping:         hydrOXYzine pamoate (VISTARIL) 25 MG Cap Comments:   Reason for Stopping:         losartan-hydrochlorothiazide 100-25 mg (HYZAAR) 100-25 mg per tablet Comments:   Reason for Stopping:         traZODone (DESYREL) 50 MG tablet Comments:   Reason for Stopping:             Discharge Procedure Orders   NEBULIZER FOR HOME USE     Order Specific Question Answer Comments   Height: 5' 8" (1.727 m)    Weight: 81.6 kg (179 lb 14.4 oz)    Does patient have medical equipment at home? oxygen    Does patient have medical equipment at home? cane, straight    Length of need (1-99 months): 99      Referral to Home health   Referral Priority: Routine Referral Type: Home Health   Referral Reason: Specialty Services Required   Requested Specialty: Home Health Services   Number of Visits Requested: 1     Diet Cardiac "   Order Comments: Patient to monitor daily weight, follow low salt diet and in case if gain more than 3 pounds in 24 hours, please call your doctor for further advice.    Boost Plus can with meals     Other restrictions (specify):   Order Comments: PLEASE OBSERVE FALL PRECAUTIONS.  Use walker for ambulation     Call MD for:   Order Comments: For worsening symptoms, chest pain, shortness of breath, increased abdominal pain, high grade fever, stroke or stroke like symptoms, immediately go to the nearest Emergency Room or call 911 as soon as possible.     Follow-up Information     PCP In 1 week.           Shahram King MD In 2 weeks.    Specialty:  Cardiology  Contact information:  [address]  [address]  [place] [address]  [phone]             Darell Rose MD In 2 weeks.    Specialty:  Pulmonary Disease  Contact information:  [address]  [address]  [place] [address]  [phone]             Mark Sanders MD In 1 week.    Specialty:  Nephrology  Why:  Follow-up regarding kidney biopsy arranged by Dr. Sanders.  You will need to hold Plavix use 5 days prior to kidney biopsy.  Contact information:  [address]  [place] [address]  [phone]             Please follow up.    Contact information:  Continue supplemental oxygen as directed.           Sonia Cordova MD In 1 week.    Specialty:  Rheumatology  Why:  Regarding mixed connective tissue disorder management  Contact information:  [address]  [address]  [place] [address]  [phone]                 Time spent on the discharge of patient: 37 minutes  Patient was seen and examined on the date of discharge and determined to be suitable for discharge.

## 2019-04-12 NOTE — PT/OT/SLP PROGRESS
Physical Therapy Treatment    Patient Name:  Duc Spears   MRN:  [id number]    Recommendations:     Discharge Recommendations:  home health PT   Discharge Equipment Recommendations: walker, rolling   Barriers to discharge: None    Assessment:     Duc Spears is a 74 y.o. male admitted with a medical diagnosis of Acute on chronic respiratory failure with hypoxia.  He presents with the following impairments/functional limitations:  weakness, impaired self care skills, impaired functional mobilty, gait instability, impaired cardiopulmonary response to activity .    Rehab Prognosis: Good; patient would benefit from acute skilled PT services to address these deficits and reach maximum level of function.    Recent Surgery: Procedure(s) (LRB):  Bronchoscopy - need to cleanout, in icu best.  marginal pt (N/A)      Plan:     During this hospitalization, patient to be seen 6 x/week to address the identified rehab impairments via gait training, therapeutic activities, therapeutic exercises and progress toward the following goals:    · Plan of Care Expires:  04/26/19    Subjective     Chief Complaint: none  stated  Patient/Family Comments/goals: to return home with wife.  Pain/Comfort:  · Pain Rating 1: 0/10      Objective:     Communicated with nurse Blanton prior to session.  Patient found supine with castaneda catheter, oxygen, telemetry, peripheral IV upon PT entry to room.     General Precautions: Standard, fall   Orthopedic Precautions:N/A   Braces:       Functional Mobility:  · Bed Mobility:     · Rolling Left:  contact guard assistance  · Supine to Sit: contact guard assistance  · Transfers:     · Sit to Stand:  contact guard assistance with rolling walker  · Gait: 180' with rw and CGA with O2 attached and IV in tow.      AM-PAC 6 CLICK MOBILITY          Therapeutic Activities and Exercises:   Ambulated slowly with rw and CGA with O2 attached and IV in tow.    Patient left up in chair with all lines  intact, call button in reach, chair alarm on and nurse Franny notified..    GOALS:   Multidisciplinary Problems     Physical Therapy Goals        Problem: Physical Therapy Goal    Goal Priority Disciplines Outcome Goal Variances Interventions   Physical Therapy Goal     PT, PT/OT Ongoing (interventions implemented as appropriate)     Description:  Goals to be met by: 2019     Patient will increase functional independence with mobility by performin. Supine to sit with Stand-by Assistance  2. Sit to stand transfer with Stand-by Assistance  3. Bed to chair transfer with Stand-by Assistance   4. Gait  x 250 feet with Contact Guard Assistance using Rolling Walker.   5. Lower extremity exercise program x20 reps                     Time Tracking:     PT Received On: 19  PT Start Time: 1050     PT Stop Time: 1100  PT Total Time (min): 10 min     Billable Minutes: Gait Training 10min    Treatment Type: Treatment  PT/PTA: PTA     PTA Visit Number: 4     Zee Retana PTA  2019

## 2019-04-12 NOTE — PLAN OF CARE
· 4/12/2019 1:59:53 PM Completed  Bella Mccloud  · 4/12/2019 1:33:51 PM Accepted  Quinton [email or web address]  · 4/12/2019 10:46:13 AM New: Pt needs HH  Bella Mccloud    The pt is accepted by [facility] via [facility]. PTS AVS updated. Bella Mccloud LMSW     04/12/19 1400   Post-Acute Status   Post-Acute Authorization Home Health/Hospice   Home Health/Hospice Status Set-up Complete

## 2019-04-12 NOTE — PLAN OF CARE
I met with the pt at bedside regarding orders for a home nebulizer and HH. He stated that he has home O2 but does not know the name of the company so that I can order the pt home nebulizer. He stated that he has tried to call his wife this morning but the phone just rings a while then hangs up. I told him that I would try to call her also. I called Venecia Spears at [phone] and the phone just rang with no option to leave a voicemail. The pt stated that he is fine with HH with the first available [employer] provider. The pt has had the home O2 for about 3 months and it was ordered by the pts PCP. Cm will continue to follow for discharge needs. Bella Mccloud LMSW

## 2019-04-12 NOTE — PROGRESS NOTES
Progress Note  PULMONARY    Admit Date: 4/3/2019   04/12/2019      SUBJECTIVE:     April 4th, alert, on niv and tolerating well.    April 5, choked on breakfast, less sob, off niv now.  April 8, now on floor, progressing - better- no c/o  April 9th, no complaints  April 10th, stable, breathing fairly well.  Hoping outpt am.  April11, no c/o, dc am?  April 12th, no new complaints      PFSH and Allergies reviewed.    OBJECTIVE:     Vitals (Most recent):  Vitals:    04/12/19 0813   BP: (!) 192/84   Pulse: (!) 113   Resp: 18   Temp: 98.6 °F (37 °C)       Vitals (24 hour range):  Temp:  [97.7 °F (36.5 °C)-98.6 °F (37 °C)]   Pulse:  []   Resp:  [18-20]   BP: (129-192)/(65-84)   SpO2:  [80 %-97 %]       Intake/Output Summary (Last 24 hours) at 4/12/2019 0950  Last data filed at 4/12/2019 0430  Gross per 24 hour   Intake 500 ml   Output 2850 ml   Net -2350 ml          Physical Exam:  The patient's neuro status (alertness,orientation,cognitive function,motor skills,), pharyngeal exam (oral lesions, hygiene, abn dentition,), Neck (jvd,mass,thyroid,nodes in neck and above/below clavicle),RESPIRATORY(symmetry,effort,fremitus,percussion,auscultation),  Cor(rhythm,heart tones including gallops,perfusion,edema)ABD(distention,hepatic&splenomegaly,tenderness,masses), Skin(rash,cyanosis),Psyc(affect,judgement,).  Exam negative except for these pertinent findings:    Alert,nc, no distress, diffuse rales, no sign edema, symmetric, nl fremitus and percussion, distent cor    Radiographs reviewed: view by direct vision  4/5/19 cw pulm edeam and copd- not much changes- 4/6 stil dense lower lungs.      No results found for this or any previous visit.]    Labs     Recent Labs   Lab 04/12/19  0515   WBC 10.80   HGB 8.3*   HCT 25.9*        Recent Labs   Lab 04/12/19  0515      K 3.5   CL 96   CO2 31*   BUN 56*   CREATININE 2.5*   *   CALCIUM 9.4   PHOS 3.6   TROPONINI 0.139*   No results for input(s): PH, PCO2, PO2,  HCO3 in the last 24 hours.  Microbiology Results (last 7 days)     Procedure Component Value Units Date/Time    Blood culture [[id number]] Collected:  04/03/19 0343    Order Status:  Completed Specimen:  Blood Updated:  04/08/19 1212     Blood Culture, Routine No growth after 5 days.    Culture, Respiratory with Gram Stain [[id number]] Collected:  04/04/19 1333    Order Status:  Completed Specimen:  Respiratory from Sputum, Expectorated Updated:  04/08/19 1035     Respiratory Culture Normal respiratory lester     Respiratory Culture No S aureus or Pseudomonas isolated.     Gram Stain (Respiratory) <10 epithelial cells per low power field.     Gram Stain (Respiratory) Rare WBC's     Gram Stain (Respiratory) Moderate Gram positive cocci     Gram Stain (Respiratory) Few yeast          Impression:  Active Hospital Problems    Diagnosis  POA    *Acute on chronic respiratory failure with hypoxia [J96.21]  Yes    Aspiration into airway [T17.908A]  No    CAD (coronary artery disease) [I25.10]  Yes    Elevated d-dimer [R79.89]  Yes    COPD exacerbation [J44.1]  Yes    Calcification of aorta [I70.0]  Yes    Acute pulmonary edema [J81.0]  Yes    Acute diastolic congestive heart failure [I50.31]  Yes    Essential hypertension [I10]  Yes    COSTA (acute kidney injury) [N17.9]  Yes      Resolved Hospital Problems   No resolved problems to display.               Plan:   April 4 - only 800cc negative on lasix 80q8- still in pulm edema, echo diastolic chf.  Renal would seem biggest issue.  Lasix drip or dialysis.  resp compensated but marginal..    Will order lasix drip.  Likely needs dialysis acutely.  Renal is seeing.    April 5 , I/o -2200 on lasix drip 10/hr, aspirated some breakfast but breathing better.  Creat better on lasix drip- continue drip.  Speech eval  gpc in sputum, improved but slow.  Question if aspiration significant?  procal was 0.12 on 3rd.    April 8, lytes good but cxr 4/6 still bilat lower lung symmetric  dz.     F/u cxr and consider ct if not clearing?    Need to get off lasix drip- will stop am if cxr cleared..    April 9, cxr slowly better, needs  effective diuretic regimen.  He still has edema in the lung and also pleural effusions bilaterally on x-ray.    Would be reasonable to consider outpatient management and office follow-up in a week.  Monitoring weights would be good.  Home health would be needed.  Patient is expected to be marginal given his edema problems/fluid retention    April 10th, patient has aspiration tendency and is being managed by speech with techniques.  Patient is off the Lasix drip.  Fluid balance was negative yesterday.  Respiratory status seems to be improving.  Outpatient therapy and follow-up would be considered soon    April 11, will go to po doxycycline day 8 hosp stay from iv doxy and rocephin.  outpt ok resp- off abx ok.      April 12th-respiratory status seems to be stable, no change in plan        .

## 2019-04-12 NOTE — PLAN OF CARE
04/12/19 1539   Medicare Message   Important Message from Medicare regarding Discharge Appeal Rights Given to patient/caregiver;Explained to patient/caregiver;Signed/date by patient/caregiver   Date IMM was signed 04/12/19   Time IMM was signed [id number]

## 2019-04-12 NOTE — PLAN OF CARE
Problem: Adult Inpatient Plan of Care  Goal: Patient-Specific Goal (Individualization)  Intervention: Nutrition education-cardiac, low sodium diet, Fat/sodium/Texture modified diet      Recommendation:   1) Continue diet as ordered   2) Nutrition education and handouts given   3) Weight pt weekly and obtain standing height as able     Goals: PO intakes > 75% of meals(most meals this admission)  Nutrition Goal Status: goal met  Communication of RD Recs: (POC, sticky note)

## 2019-04-12 NOTE — PLAN OF CARE
Problem: Physical Therapy Goal  Goal: Physical Therapy Goal  Goals to be met by: 2019     Patient will increase functional independence with mobility by performin. Supine to sit with Stand-by Assistance  2. Sit to stand transfer with Stand-by Assistance  3. Bed to chair transfer with Stand-by Assistance   4. Gait  x 250 feet with Contact Guard Assistance using Rolling Walker.   5. Lower extremity exercise program x20 reps    Outcome: Ongoing (interventions implemented as appropriate)  Ambulated with rw and CGA for safety with O2 attached.

## 2019-04-12 NOTE — PLAN OF CARE
04/11/19 2000   Patient Assessment/Suction   Level of Consciousness (AVPU) alert   Respiratory Effort Normal;Unlabored   Expansion/Accessory Muscles/Retractions expansion symmetric;no retractions;no use of accessory muscles   All Lung Fields Breath Sounds diminished   Cough Frequency no cough   PRE-TX-O2   O2 Device (Oxygen Therapy) nasal cannula   Flow (L/min) 3   Oxygen Concentration (%) 32   SpO2 (!) 92 %   Pulse Oximetry Type Intermittent   Pulse 94   Resp 18   Aerosol Therapy   $ Aerosol Therapy Charges Aerosol Treatment   Respiratory Treatment Status (SVN) given   Treatment Route (SVN) mask;oxygen   Patient Position (SVN) HOB elevated   Signs of Intolerance (SVN) none   Breath Sounds Post-Respiratory Treatment   Throughout All Fields Post-Treatment All Fields   Throughout All Fields Post-Treatment no change   Post-treatment Heart Rate (beats/min) 99   Post-treatment Resp Rate (breaths/min) 18   Ready to Wean/Extubation Screen   FIO2<=50 (chart decimal) 0.32

## 2019-04-12 NOTE — PLAN OF CARE
04/12/19 1534   Post-Acute Status   Post-Acute Authorization Home Health/Hospice   HME Status Pending Delivery Hospital  ([facility])   Received call from Wil from [facility] and they will deliver oxygen tank to hospital for patient discharge.

## 2019-04-12 NOTE — PLAN OF CARE
04/12/19 1405   Post-Acute Status   Post-Acute Authorization HME   HME Status Pending Qualifying Diagnosis   CM was informed that patient needs portable oxygen tanks. CM called PCP and was able to determine where original oxygen scrip was sent. [phone] and they do have his oxygen scrip for nocturnal oxygen. Patient will need new home O2 eval and oxygen order for portable tanks. CM placed order for home o2 eval and called resp to expedite. CM will follow.

## 2019-04-12 NOTE — PLAN OF CARE
04/12/19 1209   Final Note   Assessment Type Final Discharge Note   Anticipated Discharge Disposition Home-Health   CM made hospital followup with Dr. Bennett [phone], added to avs and faxed hospital records to office per request @ [phone]. CM made hospital followup appointments with Dr. Cordova and added to AVS. CM attempted to make appointments with Tommy Willis and [place] but offices requested that patient call to make appointment.

## 2019-04-12 NOTE — PROGRESS NOTES
INPATIENT NEPHROLOGY PROGRESS NOTE  [facility] NEPHROLOGY    Patient Name: Duc Spears  Date: 04/12/2019    Reason for consultation: COSTA    Chief Complaint: Dyspnea    History of Present Illness:  75M with COPD on 2L home oxygen, CAD (MI 1988), CHF, HTN, smoker for many years (quit 2 weeks) presented with SOB, wheezing for 2 weeks, undergoing treatment for COPD and CHF, consulted for COSTA.    · Interval History/Subjective:    - BP is high today, on 3-4L NC  - UOP 2.8L  - no cp, dyspnea, abd pain, edema  - ambulating hallway with PT- looks great    · Review of Systems: All 14 systems reviewed and negative, except as noted in HPI    Plan of Care:    Assessment:  COSTA vs CKD  HTN  CHF exacerbation (baseline COPD with pulm HTN/restrictive physiology with DD)  Hypokalemia/Alkalosis  SHPT (vitamin D deficiency + CKD related)  Iron Deficiency Anemia     Plan:    - Renal function is stable. He is nonoliguric. He has 5g proteinuria and microscopic hematuria. 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 Complements are normal. Hepatitis and HIV are negative. Cryoglobulin, ANCA and antiGBM are pending. He will need f/u with rheumatology. He will need cardiac clearance to be off blood thinners for a renal biopsy- can plan as outpatient. Since renal function is not steadily declining, suspicion for RPGN requiring pulse steroids is low. We can start oral steroids as outpatient in coordination with rheumatology. No NSAIDs or IV contrast.   - F/U BP after working with PT. Continue nifedipine XL 30mg daily. VS are improved. Continue oral lasix. Keep ARB on hold at discharge.   - K is stable. Continue standing oral KCl 40mEq BID and add a liberalized K diet. Alkalosis is better. Continue NIPPV/O2 support when needed.  - Continue cholecalciferol 2000 units daily.   - Hb stabilized. He is on iron tablets TID.  Occult stool is pending.    Needs renal panel, Mg, and CBC in 1 week and renal f/u in 2-4 weeks.    Thank you for allowing us to participate in this patient's care. We will continue to follow.    Medications:  No current facility-administered medications on file prior to encounter.      Current Outpatient Medications on File Prior to Encounter   Medication Sig Dispense Refill    albuterol (VENTOLIN HFA) 90 mcg/actuation inhaler Ventolin HFA 90 mcg/actuation aerosol inhaler      atorvastatin (LIPITOR) 20 MG tablet atorvastatin 20 mg tablet      clopidogrel (PLAVIX) 75 mg tablet clopidogrel 75 mg tablet      cyanocobalamin (VITAMIN B-12) 1000 MCG tablet Vitamin B-12 1,000 mcg tablet   TK 1 T PO QD      escitalopram oxalate (LEXAPRO) 10 MG tablet escitalopram 10 mg tablet      folic acid (FOLVITE) 1 MG tablet folic acid 1 mg tablet   TK 1 T PO D      omeprazole (PRILOSEC) 20 MG capsule omeprazole 20 mg capsule,delayed release      ondansetron (ZOFRAN) 4 MG tablet ondansetron HCl 4 mg tablet      oxybutynin (DITROPAN-XL) 5 MG TR24 oxybutynin chloride ER 5 mg tablet,extended release 24 hr   TK 1 T PO D      pantoprazole (PROTONIX) 40 MG tablet pantoprazole 40 mg tablet,delayed release      polyethylene glycol (GAVILYTE-G) 236-22.74-6.74 -5.86 gram suspension GaviLyte-G 236 gram-22.74 gram-6.74 gram-5.86 gram oral solution      pregabalin (LYRICA) 150 MG capsule Lyrica 150 mg capsule      sucralfate (CARAFATE) 1 gram tablet sucralfate 1 gram tablet      sulindac (CLINORIL) 200 MG Tab sulindac 200 mg tablet      [DISCONTINUED] ferrous sulfate (FEOSOL) 325 mg (65 mg iron) Tab tablet ferrous sulfate 325 mg (65 mg iron) tablet   TK 1 T PO TID      [DISCONTINUED] hydroCHLOROthiazide (HYDRODIURIL) 50 MG tablet hydrochlorothiazide 50 mg tablet      [DISCONTINUED] hydrOXYzine pamoate (VISTARIL) 25 MG Cap hydroxyzine pamoate 25 mg capsule      [DISCONTINUED] losartan-hydrochlorothiazide 100-25 mg (HYZAAR) 100-25  mg per tablet losartan 100 mg-hydrochlorothiazide 25 mg tablet      [DISCONTINUED] potassium chloride SA (K-DUR,KLOR-CON) 20 MEQ tablet potassium chloride ER 20 mEq tablet,extended release(part/cryst)      HYDROcodone-acetaminophen (NORCO) 7.5-325 mg per tablet hydrocodone 7.5 mg-acetaminophen 325 mg tablet      nitroGLYCERIN (NITROSTAT) 0.4 MG SL tablet nitroglycerin 0.4 mg sublingual tablet      [DISCONTINUED] traZODone (DESYREL) 50 MG tablet trazodone 50 mg tablet       Scheduled Meds:   atorvastatin  20 mg Oral Daily    budesonide  0.5 mg Nebulization Q12H    carvedilol  6.25 mg Oral BID    clopidogrel  75 mg Oral Daily    doxycycline  100 mg Oral Q12H    enoxaparin  30 mg Subcutaneous Daily    escitalopram oxalate  10 mg Oral Daily    ferrous sulfate  325 mg Oral TID    folic acid  1 mg Oral Daily    furosemide  40 mg Oral Daily    hydrALAZINE  100 mg Oral Q8H    ipratropium  0.5 mg Nebulization Q6H    levalbuterol  1.25 mg Nebulization Q12H    NIFEdipine  30 mg Oral Daily    oxybutynin  5 mg Oral Daily    pantoprazole  40 mg Oral Daily    potassium chloride SA  40 mEq Oral BID    traZODone  50 mg Oral QHS    vitamin D  2,000 Units Oral Daily     Continuous Infusions:    PRN Meds:.acetaminophen, albuterol-ipratropium, famotidine, hydrALAZINE, influenza, ondansetron, pneumoc 13-danie conj-dip cr(PF), ramelteon, senna-docusate 8.6-50 mg, sodium chloride 0.9%    Allergies:  Codeine    Vital Signs:  Temp Readings from Last 3 Encounters:   04/12/19 98.6 °F (37 °C)   04/03/19 98.1 °F (36.7 °C) (Axillary)       Pulse Readings from Last 3 Encounters:   04/12/19 (!) 113   04/03/19 72       BP Readings from Last 3 Encounters:   04/12/19 (!) 192/84   04/03/19 (!) 180/73       Weight:  Wt Readings from Last 3 Encounters:   04/08/19 81.6 kg (179 lb 14.4 oz)   04/02/19 86.2 kg (190 lb)       INS/OUTS:  I/O last 3 completed shifts:  In: 920 [P.O.:600; I.V.:20; IV Piggyback:300]  Out: 3350 [Urine:3350]  No  intake/output data recorded.    Physical Exam:  Constitutional: nad, aao x 3  Heart: rrr, no m/r/g, wwp, no edema  Lungs: ctab, no w/r/c, no lb  Abdomen: s/nt/nd, +BS    Results:  Lab Results   Component Value Date     04/12/2019    K 3.5 04/12/2019    CL 96 04/12/2019    CO2 31 (H) 04/12/2019    BUN 56 (H) 04/12/2019    CREATININE 2.5 (H) 04/12/2019    CALCIUM 9.4 04/12/2019    ANIONGAP 10 04/12/2019    ESTGFRAFRICA 29 (A) 04/12/2019    EGFRNONAA 25 (A) 04/12/2019       Lab Results   Component Value Date    CALCIUM 9.4 04/12/2019    PHOS 3.6 04/12/2019       Recent Labs   Lab 04/12/19  0515   WBC 10.80   RBC 3.15*   HGB 8.3*   HCT 25.9*      MCV 82   MCH 26.3*   MCHC 32.0       I have personally reviewed pertinent radiological imaging and reports.    Mark Sanders MD MPH  [facility] [address]  [address] [address]  [phone] (p)  [phone] (f)

## 2019-04-12 NOTE — PLAN OF CARE
I spoke to the pts wife and confirmed that her cell # is [phone]. The pts home O2 is from [facility] and his PCP is Dr. Bennett in [facility]. I updated RENE Randall CM and she is going to make the pt a follow up appt with his PCP. I will send HH orders to [facility] and call [facility] at [phone] to obtain home nebulizer. Bella Mccloud LMSW     Per [facility] [phone]- they do not supply home nebulizer's. Bella Mccloud LMSW       04/12/19 1031   Discharge Assessment   Assessment Type Discharge Planning Reassessment

## 2019-04-12 NOTE — PLAN OF CARE
Problem: SLP Goal  Goal: SLP Goal    1) MBSS--MET  NEW: Consume recommended diet of mechanical soft (IDDSI-Minced and Moist) textures and nectar thick liquids with no overt s/s penetration/aspiration  2) Demonstrate use of compensatory swallow strategies/aspriation precautions with SPV  3) Perform CTAR, effortful swallow, Shaker and Mendelsohn with MIN cues         Participated in dysthagia therapy. Pt reports he is discharging today. Discharge on current diet and F/U with ST upon discharge.     Roseanna Dixon MS, CCC/SLP 4/12/2019

## 2019-04-12 NOTE — PLAN OF CARE
Problem: Adult Inpatient Plan of Care  Goal: Plan of Care Review  Patient alert and oriented.  Currently sinus rhythm on cardiac monitor.  Chao to gravity.  Full code.  Nasal canula at 3 liters.  Monitoring troponin closely, Dr. King on case.  Call light in reach.   Bed in low/locked position.   Bed alarm on for patient's safety.

## 2019-04-12 NOTE — PLAN OF CARE
04/12/19 0745   Patient Assessment/Suction   Level of Consciousness (AVPU) alert   Respiratory Effort Unlabored;Normal   Expansion/Accessory Muscles/Retractions no use of accessory muscles;no retractions;expansion symmetric   All Lung Fields Breath Sounds diminished;wheezes, expiratory   Rhythm/Pattern, Respiratory depth regular;pattern regular;unlabored   Cough Frequency infrequent   Cough Type good;nonproductive   PRE-TX-O2   O2 Device (Oxygen Therapy) nasal cannula   $ Is the patient on Low Flow Oxygen? Yes   Flow (L/min) 4   SpO2 (!) 92 %   Pulse Oximetry Type Intermittent   $ Pulse Oximetry - Multiple Charge Pulse Oximetry - Multiple   Pulse 90   Resp 18   Aerosol Therapy   $ Aerosol Therapy Charges Aerosol Treatment   Respiratory Treatment Status (SVN) given   Treatment Route (SVN) mask   Patient Position (SVN) HOB elevated   Post Treatment Assessment (SVN) breath sounds unchanged   Signs of Intolerance (SVN) none   Breath Sounds Post-Respiratory Treatment   Throughout All Fields Post-Treatment All Fields   Throughout All Fields Post-Treatment no change   Post-treatment Heart Rate (beats/min) 90   Post-treatment Resp Rate (breaths/min) 18       Aerosol treatments q6 with Atrovent, q12 with Xopenex, q12 with Pulmicort and q4 PRN with Duoneb.

## 2019-04-12 NOTE — PLAN OF CARE
I sent the pts face sheet, H&P, O2 eval and O2 orders to [facility] at [phone]. CM will continue to follow. Bella Mccloud LMSW     3:55- CMN form signed by Dr. Landa and returned to [facility] [phone]. Bella Mccloud LMSW       04/12/19 1512   Post-Acute Status   Post-Acute Authorization HME   HME Status Referrals Sent

## 2019-04-12 NOTE — PLAN OF CARE
I received approval from Mini Ruiz with [facility] to pull the pts nebulizer. I delivered the nebulizer to the pts room and had the delivery ticket signed. Completed paperwork returned to [facility]. Bella Mccloud LMSW     04/12/19 2581   Post-Acute Status   Post-Acute Authorization HME   E Status Set-up Complete

## 2019-04-12 NOTE — NURSING
Pt briefed on discharge info and new meds, pt given prescriptions. Pt VSS. Pt transported off unit via w/c, going home with portable 02 tank.

## 2019-04-15 ENCOUNTER — PATIENT OUTREACH (OUTPATIENT)
Dept: ADMINISTRATIVE | Facility: CLINIC | Age: 74
End: 2019-04-15

## 2019-04-15 NOTE — PLAN OF CARE
04/15/19 0823   Final Note   Assessment Type Final Discharge Note   Anticipated Discharge Disposition Home-Health

## 2019-04-15 NOTE — PATIENT INSTRUCTIONS
COPD Flare  Both emphysema and chronic bronchitis are forms of Chronic Obstructive Pulmonary Disease (COPD). It is most often caused by many years of smoking tobacco. Many things can make your lung disease suddenly get worse. These causes include the common cold, pneumonia, acute bronchitis, missing doses of your regular breathing medicines, or exposure to smoke, dust, or other air pollutants.  A COPD flare may last 7-14 days. Medicine may be prescribed to relax the airways and prevent wheezing. Antibiotics will be prescribed if your doctor thinks there is a bacterial infection. Prednisone is helpful to decrease inflammation in a severe attack.  Home Care:  Drink lots of water or other fluids (at least 10 glasses a day) during an attack. This will loosen lung secretions and make it easier to breathe. If you have heart or kidney disease, check with your doctor before you drink extra amounts of fluids.  Take prescribed medicine exactly at the times advised. If you have a hand-held inhaler or aerosol breathing medicine, do not use it more than once every four hours, unless told to do so. If prescribed an antibiotic or prednisone, take all of the medicine even if you are feeling better after a few days.  Do not smoke. Avoid being exposed to the smoke of others.  If you were given an inhaler, use it exactly as directed. If you need to use it more often than prescribed, your condition may be getting worse. Contact your doctor or this facility.  Follow Up  with your doctor, or as advised by our staff.  NOTE: If you are age 65 or older, or if you have chronic asthma or COPD, we  recommend a PNEUMOCOCCAL VACCINATION every five years and an INFLUENZA VACCINATION (FLU-SHOT) every autumn. Ask your doctor about this.  Get Prompt Medical Attention  if any of the following occur:  Increased wheezing or shortness of breath  Need to use your inhalers more often than usual without relief  Fever of 100.4°F (38ºC) or higher, or as  directed by your healthcare provider  Coughing up lots of dark-colored or bloody sputum (mucus)  Chest pain with each breath  You do not start to improve within 24 hours  © 5331-0994 [employer], [address] [place] [address], [place] [address]. All rights reserved. This information is not intended as a substitute for professional medical care. Always follow your healthcare professional's instructions.

## 2019-04-16 NOTE — PHYSICIAN QUERY
PT Name: Duc Spears  MR #: [id number]    Physician Query Form - Cause and Effect Relationship Clarification      CDS/: Monie Person               Contact information: [email or web address]      This form is a permanent document in the medical record.     Query Date: April 16, 2019    By submitting this query, we are merely seeking further clarification of documentation. Please utilize your independent clinical judgment when addressing the question(s) below.    The Medical record contains the following:  Supporting Clinical Findings   Location in record    Continue intravenous Rocephin and doxycycline for pneumonia management.  Follow microbiology results.          Culture, Respiratory with Gram Stain [[id number]] Collected:  04/04/19 1333    Order Status:  Completed Specimen:  Respiratory from Sputum, Expectorated Updated:  04/08/19 1035      Respiratory Culture Normal respiratory lester      Respiratory Culture No S aureus or Pseudomonas isolated.      Gram Stain (Respiratory) <10 epithelial cells per low power field.      Gram Stain (Respiratory) Rare WBC's      Gram Stain (Respiratory) Moderate Gram positive cocci      Gram Stain (Respiratory) Few       April 4 - only 800cc negative on lasix 80q8- still in pulm edema, echo diastolic chf.  Renal would seem biggest issue.  Lasix drip or dialysis.  resp compensated but marginal..    Will order lasix drip.  Likely needs dialysis acutely.  Renal is seeing.     April 5 , I/o -2200 on lasix drip 10/hr, aspirated some breakfast but breathing better.  Creat better on lasix drip- continue drip.  Speech eval  gpc in sputum, improved but slow.  Question if aspiration significant?  procal was 0.12 on 3rd.     April 8, lytes good but cxr 4/6 still bilat lower lung symmetric dz.      F/u cxr and consider ct if not clearing?     Need to get off lasix drip- will stop am if cxr cleared..     April 9, cxr slowly better, needs  effective diuretic regimen.  He still has  edema in the lung and also pleural effusions bilaterally on x-ray.     Would be reasonable to consider outpatient management and office follow-up in a week.  Monitoring weights would be good.  Home health would be needed.  Patient is expected to be marginal given his edema problems/fluid retention     April 10th, patient has aspiration tendency and is being managed by speech with techniques.  Patient is off the Lasix drip.  Fluid balance was negative yesterday.  Respiratory status seems to be improving.  Outpatient therapy and follow-up would be considered soon     April 11, will go to [facility]xy[facility] day 8 hosp stay from iv doxy and rocephin.  outpt ok resp- off abx ok.       April 12th-respiratory status seems to be stable, no change in plan                                                                                                                H&P      Pulmonology note 4/12                           Pulmonology note 4/12          Provider, please clarify if there is any correlation between _Pneumonia _ and _Moderate Gram positive_.           Are the conditions:      [ x ] Due to or associated with each other   [  ] Unrelated to each other   [  ] Other (Please Specify): _________________________   [  ] Clinically Undetermined

## 2019-04-19 LAB — CRYOGLOB SER QL: NORMAL

## 2019-07-08 PROBLEM — J81.0 ACUTE PULMONARY EDEMA: Status: RESOLVED | Noted: 2019-04-03 | Resolved: 2019-07-08
